# Patient Record
Sex: MALE | Race: WHITE | NOT HISPANIC OR LATINO | Employment: OTHER | URBAN - METROPOLITAN AREA
[De-identification: names, ages, dates, MRNs, and addresses within clinical notes are randomized per-mention and may not be internally consistent; named-entity substitution may affect disease eponyms.]

---

## 2017-01-13 ENCOUNTER — COMMUNICATION - HEALTHEAST (OUTPATIENT)
Dept: FAMILY MEDICINE | Facility: CLINIC | Age: 82
End: 2017-01-13

## 2017-01-15 ENCOUNTER — AMBULATORY - HEALTHEAST (OUTPATIENT)
Dept: FAMILY MEDICINE | Facility: CLINIC | Age: 82
End: 2017-01-15

## 2017-01-15 DIAGNOSIS — E53.8 OTHER B-COMPLEX DEFICIENCIES: ICD-10-CM

## 2017-01-17 ENCOUNTER — AMBULATORY - HEALTHEAST (OUTPATIENT)
Dept: NURSING | Facility: CLINIC | Age: 82
End: 2017-01-17

## 2017-01-17 ENCOUNTER — COMMUNICATION - HEALTHEAST (OUTPATIENT)
Dept: FAMILY MEDICINE | Facility: CLINIC | Age: 82
End: 2017-01-17

## 2017-01-17 DIAGNOSIS — E53.8 B12 DEFICIENCY: ICD-10-CM

## 2017-02-21 ENCOUNTER — AMBULATORY - HEALTHEAST (OUTPATIENT)
Dept: NURSING | Facility: CLINIC | Age: 82
End: 2017-02-21

## 2017-03-21 ENCOUNTER — AMBULATORY - HEALTHEAST (OUTPATIENT)
Dept: NURSING | Facility: CLINIC | Age: 82
End: 2017-03-21

## 2017-04-18 ENCOUNTER — AMBULATORY - HEALTHEAST (OUTPATIENT)
Dept: NURSING | Facility: CLINIC | Age: 82
End: 2017-04-18

## 2017-05-16 ENCOUNTER — AMBULATORY - HEALTHEAST (OUTPATIENT)
Dept: NURSING | Facility: CLINIC | Age: 82
End: 2017-05-16

## 2017-06-14 ENCOUNTER — OFFICE VISIT - HEALTHEAST (OUTPATIENT)
Dept: FAMILY MEDICINE | Facility: CLINIC | Age: 82
End: 2017-06-14

## 2017-06-14 DIAGNOSIS — Z23 NEED FOR PROPHYLACTIC VACCINATION WITH TETANUS-DIPHTHERIA (TD): ICD-10-CM

## 2017-06-14 DIAGNOSIS — I10 ESSENTIAL HYPERTENSION, BENIGN: ICD-10-CM

## 2017-06-14 DIAGNOSIS — S40.819A ARM ABRASION: ICD-10-CM

## 2017-06-14 DIAGNOSIS — M47.817 LUMBOSACRAL SPONDYLOSIS WITHOUT MYELOPATHY: ICD-10-CM

## 2017-06-14 DIAGNOSIS — E53.8 OTHER B-COMPLEX DEFICIENCIES: ICD-10-CM

## 2017-06-14 DIAGNOSIS — Z93.6 S/P ILEAL CONDUIT (H): ICD-10-CM

## 2017-06-15 ENCOUNTER — COMMUNICATION - HEALTHEAST (OUTPATIENT)
Dept: FAMILY MEDICINE | Facility: CLINIC | Age: 82
End: 2017-06-15

## 2017-07-18 ENCOUNTER — AMBULATORY - HEALTHEAST (OUTPATIENT)
Dept: NURSING | Facility: CLINIC | Age: 82
End: 2017-07-18

## 2017-08-15 ENCOUNTER — AMBULATORY - HEALTHEAST (OUTPATIENT)
Dept: NURSING | Facility: CLINIC | Age: 82
End: 2017-08-15

## 2017-09-19 ENCOUNTER — AMBULATORY - HEALTHEAST (OUTPATIENT)
Dept: NURSING | Facility: CLINIC | Age: 82
End: 2017-09-19

## 2017-10-17 ENCOUNTER — AMBULATORY - HEALTHEAST (OUTPATIENT)
Dept: NURSING | Facility: CLINIC | Age: 82
End: 2017-10-17

## 2017-10-17 DIAGNOSIS — Z23 NEED FOR IMMUNIZATION AGAINST INFLUENZA: ICD-10-CM

## 2017-11-21 ENCOUNTER — AMBULATORY - HEALTHEAST (OUTPATIENT)
Dept: NURSING | Facility: CLINIC | Age: 82
End: 2017-11-21

## 2017-12-20 ENCOUNTER — COMMUNICATION - HEALTHEAST (OUTPATIENT)
Dept: FAMILY MEDICINE | Facility: CLINIC | Age: 82
End: 2017-12-20

## 2017-12-20 ENCOUNTER — OFFICE VISIT - HEALTHEAST (OUTPATIENT)
Dept: FAMILY MEDICINE | Facility: CLINIC | Age: 82
End: 2017-12-20

## 2017-12-20 DIAGNOSIS — E53.8 B12 DEFICIENCY: ICD-10-CM

## 2017-12-20 DIAGNOSIS — J44.9 COPD (CHRONIC OBSTRUCTIVE PULMONARY DISEASE) (H): ICD-10-CM

## 2017-12-20 DIAGNOSIS — I10 ESSENTIAL HYPERTENSION, BENIGN: ICD-10-CM

## 2017-12-20 DIAGNOSIS — M47.817 LUMBOSACRAL SPONDYLOSIS WITHOUT MYELOPATHY: ICD-10-CM

## 2018-01-16 ENCOUNTER — AMBULATORY - HEALTHEAST (OUTPATIENT)
Dept: FAMILY MEDICINE | Facility: CLINIC | Age: 83
End: 2018-01-16

## 2018-01-16 ENCOUNTER — AMBULATORY - HEALTHEAST (OUTPATIENT)
Dept: NURSING | Facility: CLINIC | Age: 83
End: 2018-01-16

## 2018-01-16 DIAGNOSIS — E53.8 B12 DEFICIENCY: ICD-10-CM

## 2018-02-13 ENCOUNTER — AMBULATORY - HEALTHEAST (OUTPATIENT)
Dept: NURSING | Facility: CLINIC | Age: 83
End: 2018-02-13

## 2018-02-25 ENCOUNTER — COMMUNICATION - HEALTHEAST (OUTPATIENT)
Dept: FAMILY MEDICINE | Facility: CLINIC | Age: 83
End: 2018-02-25

## 2018-02-25 DIAGNOSIS — J44.9 COPD (CHRONIC OBSTRUCTIVE PULMONARY DISEASE) (H): ICD-10-CM

## 2018-03-13 ENCOUNTER — AMBULATORY - HEALTHEAST (OUTPATIENT)
Dept: NURSING | Facility: CLINIC | Age: 83
End: 2018-03-13

## 2018-03-13 DIAGNOSIS — E53.8 B12 DEFICIENCY: ICD-10-CM

## 2018-04-17 ENCOUNTER — AMBULATORY - HEALTHEAST (OUTPATIENT)
Dept: NURSING | Facility: CLINIC | Age: 83
End: 2018-04-17

## 2018-05-04 ENCOUNTER — RECORDS - HEALTHEAST (OUTPATIENT)
Dept: ADMINISTRATIVE | Facility: OTHER | Age: 83
End: 2018-05-04

## 2018-05-07 ENCOUNTER — RECORDS - HEALTHEAST (OUTPATIENT)
Dept: ADMINISTRATIVE | Facility: OTHER | Age: 83
End: 2018-05-07

## 2018-05-15 ENCOUNTER — AMBULATORY - HEALTHEAST (OUTPATIENT)
Dept: NURSING | Facility: CLINIC | Age: 83
End: 2018-05-15

## 2018-06-14 ENCOUNTER — OFFICE VISIT - HEALTHEAST (OUTPATIENT)
Dept: FAMILY MEDICINE | Facility: CLINIC | Age: 83
End: 2018-06-14

## 2018-06-14 ENCOUNTER — COMMUNICATION - HEALTHEAST (OUTPATIENT)
Dept: ONCOLOGY | Facility: CLINIC | Age: 83
End: 2018-06-14

## 2018-06-14 DIAGNOSIS — E53.8 B12 DEFICIENCY: ICD-10-CM

## 2018-06-14 DIAGNOSIS — R91.1 LUNG NODULE: ICD-10-CM

## 2018-06-14 DIAGNOSIS — Z93.6 S/P ILEAL CONDUIT (H): ICD-10-CM

## 2018-06-14 DIAGNOSIS — I10 ESSENTIAL HYPERTENSION, BENIGN: ICD-10-CM

## 2018-06-14 LAB
ALBUMIN SERPL-MCNC: 4 G/DL (ref 3.5–5)
ALP SERPL-CCNC: 62 U/L (ref 45–120)
ALT SERPL W P-5'-P-CCNC: 17 U/L (ref 0–45)
ANION GAP SERPL CALCULATED.3IONS-SCNC: 5 MMOL/L (ref 5–18)
AST SERPL W P-5'-P-CCNC: 21 U/L (ref 0–40)
BILIRUB DIRECT SERPL-MCNC: 0.2 MG/DL
BILIRUB SERPL-MCNC: 0.5 MG/DL (ref 0–1)
BUN SERPL-MCNC: 21 MG/DL (ref 8–28)
CALCIUM SERPL-MCNC: 9.6 MG/DL (ref 8.5–10.5)
CHLORIDE BLD-SCNC: 108 MMOL/L (ref 98–107)
CO2 SERPL-SCNC: 29 MMOL/L (ref 22–31)
CREAT SERPL-MCNC: 0.89 MG/DL (ref 0.7–1.3)
ERYTHROCYTE [DISTWIDTH] IN BLOOD BY AUTOMATED COUNT: 13.3 % (ref 11–14.5)
GFR SERPL CREATININE-BSD FRML MDRD: >60 ML/MIN/1.73M2
GLUCOSE BLD-MCNC: 98 MG/DL (ref 70–125)
HCT VFR BLD AUTO: 41.2 % (ref 40–54)
HGB BLD-MCNC: 13.7 G/DL (ref 14–18)
MCH RBC QN AUTO: 29.5 PG (ref 27–34)
MCHC RBC AUTO-ENTMCNC: 33.2 G/DL (ref 32–36)
MCV RBC AUTO: 89 FL (ref 80–100)
PLATELET # BLD AUTO: 216 THOU/UL (ref 140–440)
PMV BLD AUTO: 6.4 FL (ref 7–10)
POTASSIUM BLD-SCNC: 5.1 MMOL/L (ref 3.5–5)
PROT SERPL-MCNC: 6.7 G/DL (ref 6–8)
RBC # BLD AUTO: 4.64 MILL/UL (ref 4.4–6.2)
SODIUM SERPL-SCNC: 142 MMOL/L (ref 136–145)
WBC: 5.4 THOU/UL (ref 4–11)

## 2018-06-20 ENCOUNTER — RECORDS - HEALTHEAST (OUTPATIENT)
Dept: ADMINISTRATIVE | Facility: OTHER | Age: 83
End: 2018-06-20

## 2018-06-22 ENCOUNTER — COMMUNICATION - HEALTHEAST (OUTPATIENT)
Dept: FAMILY MEDICINE | Facility: CLINIC | Age: 83
End: 2018-06-22

## 2018-06-25 ENCOUNTER — RECORDS - HEALTHEAST (OUTPATIENT)
Dept: RADIOLOGY | Facility: CLINIC | Age: 83
End: 2018-06-25

## 2018-06-26 ENCOUNTER — COMMUNICATION - HEALTHEAST (OUTPATIENT)
Dept: FAMILY MEDICINE | Facility: CLINIC | Age: 83
End: 2018-06-26

## 2018-06-26 ENCOUNTER — COMMUNICATION - HEALTHEAST (OUTPATIENT)
Dept: ONCOLOGY | Facility: CLINIC | Age: 83
End: 2018-06-26

## 2018-06-27 ENCOUNTER — RECORDS - HEALTHEAST (OUTPATIENT)
Dept: ADMINISTRATIVE | Facility: OTHER | Age: 83
End: 2018-06-27

## 2018-07-03 ENCOUNTER — AMBULATORY - HEALTHEAST (OUTPATIENT)
Dept: RADIATION ONCOLOGY | Facility: HOSPITAL | Age: 83
End: 2018-07-03

## 2018-07-03 ENCOUNTER — OFFICE VISIT - HEALTHEAST (OUTPATIENT)
Dept: RADIATION ONCOLOGY | Facility: HOSPITAL | Age: 83
End: 2018-07-03

## 2018-07-03 DIAGNOSIS — R91.1 LUNG NODULE: ICD-10-CM

## 2018-07-05 ENCOUNTER — COMMUNICATION - HEALTHEAST (OUTPATIENT)
Dept: ONCOLOGY | Facility: CLINIC | Age: 83
End: 2018-07-05

## 2018-07-06 ENCOUNTER — HOSPITAL ENCOUNTER (OUTPATIENT)
Dept: PET IMAGING | Facility: HOSPITAL | Age: 83
Setting detail: RADIATION/ONCOLOGY SERIES
Discharge: STILL ADMITTED OR ADMINISTRATIVE | End: 2018-07-06
Attending: RADIOLOGY

## 2018-07-06 DIAGNOSIS — R91.1 LUNG NODULE: ICD-10-CM

## 2018-07-06 LAB — GLUCOSE BLDC GLUCOMTR-MCNC: 91 MG/DL

## 2018-07-06 ASSESSMENT — MIFFLIN-ST. JEOR: SCORE: 1520.87

## 2018-07-09 ENCOUNTER — COMMUNICATION - HEALTHEAST (OUTPATIENT)
Dept: INTERVENTIONAL RADIOLOGY/VASCULAR | Facility: CLINIC | Age: 83
End: 2018-07-09

## 2018-07-09 ENCOUNTER — COMMUNICATION - HEALTHEAST (OUTPATIENT)
Dept: ONCOLOGY | Facility: CLINIC | Age: 83
End: 2018-07-09

## 2018-07-10 ENCOUNTER — AMBULATORY - HEALTHEAST (OUTPATIENT)
Dept: NURSING | Facility: CLINIC | Age: 83
End: 2018-07-10

## 2018-07-13 ENCOUNTER — COMMUNICATION - HEALTHEAST (OUTPATIENT)
Dept: ONCOLOGY | Facility: CLINIC | Age: 83
End: 2018-07-13

## 2018-07-16 ENCOUNTER — HOSPITAL ENCOUNTER (OUTPATIENT)
Dept: CT IMAGING | Facility: CLINIC | Age: 83
Setting detail: RADIATION/ONCOLOGY SERIES
Discharge: STILL ADMITTED OR ADMINISTRATIVE | End: 2018-07-16
Attending: RADIOLOGY

## 2018-07-16 DIAGNOSIS — R91.1 LUNG NODULE: ICD-10-CM

## 2018-07-16 LAB
HGB BLD-MCNC: 12 G/DL (ref 14–18)
INR PPP: 1.06 (ref 0.9–1.1)
PLATELET # BLD AUTO: 158 THOU/UL (ref 140–440)

## 2018-07-16 ASSESSMENT — MIFFLIN-ST. JEOR: SCORE: 1527.96

## 2018-07-24 ENCOUNTER — AMBULATORY - HEALTHEAST (OUTPATIENT)
Dept: RADIATION ONCOLOGY | Facility: HOSPITAL | Age: 83
End: 2018-07-24

## 2018-08-07 ENCOUNTER — AMBULATORY - HEALTHEAST (OUTPATIENT)
Dept: NURSING | Facility: CLINIC | Age: 83
End: 2018-08-07

## 2018-08-09 ENCOUNTER — AMBULATORY - HEALTHEAST (OUTPATIENT)
Dept: RADIATION ONCOLOGY | Facility: HOSPITAL | Age: 83
End: 2018-08-09

## 2018-08-15 ENCOUNTER — AMBULATORY - HEALTHEAST (OUTPATIENT)
Dept: ONCOLOGY | Facility: CLINIC | Age: 83
End: 2018-08-15

## 2018-08-16 ENCOUNTER — OFFICE VISIT - HEALTHEAST (OUTPATIENT)
Dept: RADIATION ONCOLOGY | Facility: CLINIC | Age: 83
End: 2018-08-16

## 2018-08-16 DIAGNOSIS — R91.1 LUNG NODULE: ICD-10-CM

## 2018-08-23 ENCOUNTER — AMBULATORY - HEALTHEAST (OUTPATIENT)
Dept: RADIATION ONCOLOGY | Facility: CLINIC | Age: 83
End: 2018-08-23

## 2018-08-31 ENCOUNTER — COMMUNICATION - HEALTHEAST (OUTPATIENT)
Dept: RADIATION ONCOLOGY | Facility: CLINIC | Age: 83
End: 2018-08-31

## 2018-09-04 ENCOUNTER — AMBULATORY - HEALTHEAST (OUTPATIENT)
Dept: NURSING | Facility: CLINIC | Age: 83
End: 2018-09-04

## 2018-10-02 ENCOUNTER — AMBULATORY - HEALTHEAST (OUTPATIENT)
Dept: NURSING | Facility: CLINIC | Age: 83
End: 2018-10-02

## 2018-10-02 DIAGNOSIS — Z23 NEED FOR IMMUNIZATION AGAINST INFLUENZA: ICD-10-CM

## 2018-10-30 ENCOUNTER — AMBULATORY - HEALTHEAST (OUTPATIENT)
Dept: NURSING | Facility: CLINIC | Age: 83
End: 2018-10-30

## 2018-11-08 ENCOUNTER — HOSPITAL ENCOUNTER (OUTPATIENT)
Dept: CT IMAGING | Facility: CLINIC | Age: 83
Setting detail: RADIATION/ONCOLOGY SERIES
Discharge: STILL ADMITTED OR ADMINISTRATIVE | End: 2018-11-08
Attending: RADIOLOGY

## 2018-11-08 DIAGNOSIS — R91.1 LUNG NODULE: ICD-10-CM

## 2018-11-12 ENCOUNTER — AMBULATORY - HEALTHEAST (OUTPATIENT)
Dept: ONCOLOGY | Facility: CLINIC | Age: 83
End: 2018-11-12

## 2018-11-12 ENCOUNTER — OFFICE VISIT - HEALTHEAST (OUTPATIENT)
Dept: RADIATION ONCOLOGY | Facility: CLINIC | Age: 83
End: 2018-11-12

## 2018-11-12 DIAGNOSIS — R91.1 LUNG NODULE: ICD-10-CM

## 2018-11-27 ENCOUNTER — AMBULATORY - HEALTHEAST (OUTPATIENT)
Dept: NURSING | Facility: CLINIC | Age: 83
End: 2018-11-27

## 2018-12-24 ENCOUNTER — OFFICE VISIT - HEALTHEAST (OUTPATIENT)
Dept: FAMILY MEDICINE | Facility: CLINIC | Age: 83
End: 2018-12-24

## 2018-12-24 DIAGNOSIS — J43.9 PULMONARY EMPHYSEMA, UNSPECIFIED EMPHYSEMA TYPE (H): ICD-10-CM

## 2018-12-24 DIAGNOSIS — Z93.6 S/P ILEAL CONDUIT (H): ICD-10-CM

## 2018-12-24 DIAGNOSIS — I10 ESSENTIAL HYPERTENSION, BENIGN: ICD-10-CM

## 2018-12-24 DIAGNOSIS — M47.817 LUMBOSACRAL SPONDYLOSIS WITHOUT MYELOPATHY: ICD-10-CM

## 2018-12-24 DIAGNOSIS — R91.1 LUNG NODULE: ICD-10-CM

## 2018-12-24 DIAGNOSIS — E53.8 B12 DEFICIENCY: ICD-10-CM

## 2018-12-24 LAB
ALBUMIN SERPL-MCNC: 3.7 G/DL (ref 3.5–5)
ALP SERPL-CCNC: 55 U/L (ref 45–120)
ALT SERPL W P-5'-P-CCNC: 12 U/L (ref 0–45)
ANION GAP SERPL CALCULATED.3IONS-SCNC: 6 MMOL/L (ref 5–18)
AST SERPL W P-5'-P-CCNC: 16 U/L (ref 0–40)
BILIRUB DIRECT SERPL-MCNC: 0.2 MG/DL
BILIRUB SERPL-MCNC: 0.6 MG/DL (ref 0–1)
BUN SERPL-MCNC: 25 MG/DL (ref 8–28)
CALCIUM SERPL-MCNC: 9 MG/DL (ref 8.5–10.5)
CHLORIDE BLD-SCNC: 110 MMOL/L (ref 98–107)
CO2 SERPL-SCNC: 28 MMOL/L (ref 22–31)
CREAT SERPL-MCNC: 0.79 MG/DL (ref 0.7–1.3)
ERYTHROCYTE [DISTWIDTH] IN BLOOD BY AUTOMATED COUNT: 14.2 % (ref 11–14.5)
GFR SERPL CREATININE-BSD FRML MDRD: >60 ML/MIN/1.73M2
GLUCOSE BLD-MCNC: 105 MG/DL (ref 70–125)
HCT VFR BLD AUTO: 41.3 % (ref 40–54)
HGB BLD-MCNC: 13 G/DL (ref 14–18)
MCH RBC QN AUTO: 29.5 PG (ref 27–34)
MCHC RBC AUTO-ENTMCNC: 31.5 G/DL (ref 32–36)
MCV RBC AUTO: 94 FL (ref 80–100)
PLATELET # BLD AUTO: 188 THOU/UL (ref 140–440)
PMV BLD AUTO: 8.9 FL (ref 8.5–12.5)
POTASSIUM BLD-SCNC: 3.9 MMOL/L (ref 3.5–5)
PROT SERPL-MCNC: 6.2 G/DL (ref 6–8)
RBC # BLD AUTO: 4.4 MILL/UL (ref 4.4–6.2)
SODIUM SERPL-SCNC: 144 MMOL/L (ref 136–145)
WBC: 4.6 THOU/UL (ref 4–11)

## 2018-12-28 ENCOUNTER — COMMUNICATION - HEALTHEAST (OUTPATIENT)
Dept: FAMILY MEDICINE | Facility: CLINIC | Age: 83
End: 2018-12-28

## 2018-12-28 DIAGNOSIS — I10 ESSENTIAL HYPERTENSION, BENIGN: ICD-10-CM

## 2019-01-09 ENCOUNTER — COMMUNICATION - HEALTHEAST (OUTPATIENT)
Dept: FAMILY MEDICINE | Facility: CLINIC | Age: 84
End: 2019-01-09

## 2019-01-22 ENCOUNTER — AMBULATORY - HEALTHEAST (OUTPATIENT)
Dept: NURSING | Facility: CLINIC | Age: 84
End: 2019-01-22

## 2019-02-04 ENCOUNTER — OFFICE VISIT - HEALTHEAST (OUTPATIENT)
Dept: FAMILY MEDICINE | Facility: CLINIC | Age: 84
End: 2019-02-04

## 2019-02-04 DIAGNOSIS — J44.9 COPD (CHRONIC OBSTRUCTIVE PULMONARY DISEASE) (H): ICD-10-CM

## 2019-02-04 DIAGNOSIS — J44.1 COPD EXACERBATION (H): ICD-10-CM

## 2019-02-04 ASSESSMENT — MIFFLIN-ST. JEOR: SCORE: 1523.14

## 2019-02-14 ENCOUNTER — OFFICE VISIT - HEALTHEAST (OUTPATIENT)
Dept: FAMILY MEDICINE | Facility: CLINIC | Age: 84
End: 2019-02-14

## 2019-02-14 DIAGNOSIS — J98.01 BRONCHOSPASM: ICD-10-CM

## 2019-02-14 DIAGNOSIS — R05.9 COUGH: ICD-10-CM

## 2019-02-18 ENCOUNTER — COMMUNICATION - HEALTHEAST (OUTPATIENT)
Dept: FAMILY MEDICINE | Facility: CLINIC | Age: 84
End: 2019-02-18

## 2019-02-19 ENCOUNTER — AMBULATORY - HEALTHEAST (OUTPATIENT)
Dept: NURSING | Facility: CLINIC | Age: 84
End: 2019-02-19

## 2019-02-19 ENCOUNTER — AMBULATORY - HEALTHEAST (OUTPATIENT)
Dept: FAMILY MEDICINE | Facility: CLINIC | Age: 84
End: 2019-02-19

## 2019-02-19 ENCOUNTER — COMMUNICATION - HEALTHEAST (OUTPATIENT)
Dept: FAMILY MEDICINE | Facility: CLINIC | Age: 84
End: 2019-02-19

## 2019-02-19 DIAGNOSIS — E53.8 B12 DEFICIENCY: ICD-10-CM

## 2019-02-20 ENCOUNTER — AMBULATORY - HEALTHEAST (OUTPATIENT)
Dept: FAMILY MEDICINE | Facility: CLINIC | Age: 84
End: 2019-02-20

## 2019-02-20 DIAGNOSIS — E53.8 B12 DEFICIENCY: ICD-10-CM

## 2019-02-28 ENCOUNTER — OFFICE VISIT - HEALTHEAST (OUTPATIENT)
Dept: FAMILY MEDICINE | Facility: CLINIC | Age: 84
End: 2019-02-28

## 2019-02-28 DIAGNOSIS — J43.9 PULMONARY EMPHYSEMA, UNSPECIFIED EMPHYSEMA TYPE (H): ICD-10-CM

## 2019-02-28 DIAGNOSIS — R05.9 COUGH: ICD-10-CM

## 2019-03-06 ENCOUNTER — HOSPITAL ENCOUNTER (OUTPATIENT)
Dept: CT IMAGING | Facility: CLINIC | Age: 84
Discharge: HOME | End: 2019-03-06
Attending: RADIOLOGY

## 2019-03-06 DIAGNOSIS — R91.1 LUNG NODULE: ICD-10-CM

## 2019-03-19 ENCOUNTER — AMBULATORY - HEALTHEAST (OUTPATIENT)
Dept: NURSING | Facility: CLINIC | Age: 84
End: 2019-03-19

## 2019-03-21 ENCOUNTER — OFFICE VISIT - HEALTHEAST (OUTPATIENT)
Dept: FAMILY MEDICINE | Facility: CLINIC | Age: 84
End: 2019-03-21

## 2019-03-21 DIAGNOSIS — I10 ESSENTIAL HYPERTENSION, BENIGN: ICD-10-CM

## 2019-03-21 DIAGNOSIS — J43.9 PULMONARY EMPHYSEMA, UNSPECIFIED EMPHYSEMA TYPE (H): ICD-10-CM

## 2019-04-01 ENCOUNTER — COMMUNICATION - HEALTHEAST (OUTPATIENT)
Dept: FAMILY MEDICINE | Facility: CLINIC | Age: 84
End: 2019-04-01

## 2019-04-01 DIAGNOSIS — J44.1 COPD EXACERBATION (H): ICD-10-CM

## 2019-04-01 DIAGNOSIS — J44.9 COPD (CHRONIC OBSTRUCTIVE PULMONARY DISEASE) (H): ICD-10-CM

## 2019-04-16 ENCOUNTER — AMBULATORY - HEALTHEAST (OUTPATIENT)
Dept: NURSING | Facility: CLINIC | Age: 84
End: 2019-04-16

## 2019-05-01 ENCOUNTER — COMMUNICATION - HEALTHEAST (OUTPATIENT)
Dept: ADMINISTRATIVE | Facility: HOSPITAL | Age: 84
End: 2019-05-01

## 2019-05-13 ENCOUNTER — OFFICE VISIT - HEALTHEAST (OUTPATIENT)
Dept: RADIATION ONCOLOGY | Facility: CLINIC | Age: 84
End: 2019-05-13

## 2019-05-13 ENCOUNTER — AMBULATORY - HEALTHEAST (OUTPATIENT)
Dept: ONCOLOGY | Facility: CLINIC | Age: 84
End: 2019-05-13

## 2019-05-13 DIAGNOSIS — C34.12 MALIGNANT NEOPLASM OF UPPER LOBE OF LEFT LUNG (H): ICD-10-CM

## 2019-05-13 DIAGNOSIS — R91.1 LUNG NODULE: ICD-10-CM

## 2019-05-14 ENCOUNTER — AMBULATORY - HEALTHEAST (OUTPATIENT)
Dept: NURSING | Facility: CLINIC | Age: 84
End: 2019-05-14

## 2019-06-12 ENCOUNTER — OFFICE VISIT - HEALTHEAST (OUTPATIENT)
Dept: FAMILY MEDICINE | Facility: CLINIC | Age: 84
End: 2019-06-12

## 2019-06-12 DIAGNOSIS — E53.8 B12 DEFICIENCY: ICD-10-CM

## 2019-06-12 DIAGNOSIS — Z93.6 S/P ILEAL CONDUIT (H): ICD-10-CM

## 2019-06-12 DIAGNOSIS — I10 ESSENTIAL HYPERTENSION, BENIGN: ICD-10-CM

## 2019-06-12 DIAGNOSIS — J43.9 PULMONARY EMPHYSEMA, UNSPECIFIED EMPHYSEMA TYPE (H): ICD-10-CM

## 2019-06-12 LAB
ANION GAP SERPL CALCULATED.3IONS-SCNC: 8 MMOL/L (ref 5–18)
BUN SERPL-MCNC: 23 MG/DL (ref 8–28)
CALCIUM SERPL-MCNC: 9.2 MG/DL (ref 8.5–10.5)
CHLORIDE BLD-SCNC: 109 MMOL/L (ref 98–107)
CO2 SERPL-SCNC: 27 MMOL/L (ref 22–31)
CREAT SERPL-MCNC: 0.84 MG/DL (ref 0.7–1.3)
GFR SERPL CREATININE-BSD FRML MDRD: >60 ML/MIN/1.73M2
GLUCOSE BLD-MCNC: 96 MG/DL (ref 70–125)
POTASSIUM BLD-SCNC: 4.7 MMOL/L (ref 3.5–5)
SODIUM SERPL-SCNC: 144 MMOL/L (ref 136–145)
VIT B12 SERPL-MCNC: 258 PG/ML (ref 213–816)

## 2019-06-12 ASSESSMENT — MIFFLIN-ST. JEOR: SCORE: 1509.53

## 2019-07-16 ENCOUNTER — AMBULATORY - HEALTHEAST (OUTPATIENT)
Dept: NURSING | Facility: CLINIC | Age: 84
End: 2019-07-16

## 2019-07-26 ENCOUNTER — COMMUNICATION - HEALTHEAST (OUTPATIENT)
Dept: FAMILY MEDICINE | Facility: CLINIC | Age: 84
End: 2019-07-26

## 2019-07-26 DIAGNOSIS — J43.9 PULMONARY EMPHYSEMA, UNSPECIFIED EMPHYSEMA TYPE (H): ICD-10-CM

## 2019-07-29 ENCOUNTER — COMMUNICATION - HEALTHEAST (OUTPATIENT)
Dept: FAMILY MEDICINE | Facility: CLINIC | Age: 84
End: 2019-07-29

## 2019-07-29 DIAGNOSIS — J43.9 PULMONARY EMPHYSEMA, UNSPECIFIED EMPHYSEMA TYPE (H): ICD-10-CM

## 2019-08-20 ENCOUNTER — AMBULATORY - HEALTHEAST (OUTPATIENT)
Dept: NURSING | Facility: CLINIC | Age: 84
End: 2019-08-20

## 2019-09-17 ENCOUNTER — AMBULATORY - HEALTHEAST (OUTPATIENT)
Dept: NURSING | Facility: CLINIC | Age: 84
End: 2019-09-17

## 2019-09-24 ENCOUNTER — COMMUNICATION - HEALTHEAST (OUTPATIENT)
Dept: FAMILY MEDICINE | Facility: CLINIC | Age: 84
End: 2019-09-24

## 2019-09-24 DIAGNOSIS — I10 ESSENTIAL HYPERTENSION, BENIGN: ICD-10-CM

## 2019-09-26 ENCOUNTER — COMMUNICATION - HEALTHEAST (OUTPATIENT)
Dept: FAMILY MEDICINE | Facility: CLINIC | Age: 84
End: 2019-09-26

## 2019-09-26 DIAGNOSIS — I10 ESSENTIAL HYPERTENSION, BENIGN: ICD-10-CM

## 2019-10-15 ENCOUNTER — AMBULATORY - HEALTHEAST (OUTPATIENT)
Dept: NURSING | Facility: CLINIC | Age: 84
End: 2019-10-15

## 2019-10-15 DIAGNOSIS — Z23 NEED FOR IMMUNIZATION AGAINST INFLUENZA: ICD-10-CM

## 2019-10-28 ENCOUNTER — OFFICE VISIT - HEALTHEAST (OUTPATIENT)
Dept: FAMILY MEDICINE | Facility: CLINIC | Age: 84
End: 2019-10-28

## 2019-10-28 DIAGNOSIS — J44.1 COPD EXACERBATION (H): ICD-10-CM

## 2019-10-28 DIAGNOSIS — J44.9 COPD (CHRONIC OBSTRUCTIVE PULMONARY DISEASE) (H): ICD-10-CM

## 2019-11-05 ENCOUNTER — COMMUNICATION - HEALTHEAST (OUTPATIENT)
Dept: FAMILY MEDICINE | Facility: CLINIC | Age: 84
End: 2019-11-05

## 2019-11-05 DIAGNOSIS — J44.1 COPD EXACERBATION (H): ICD-10-CM

## 2019-11-06 ENCOUNTER — OFFICE VISIT - HEALTHEAST (OUTPATIENT)
Dept: FAMILY MEDICINE | Facility: CLINIC | Age: 84
End: 2019-11-06

## 2019-11-06 DIAGNOSIS — J44.1 COPD EXACERBATION (H): ICD-10-CM

## 2019-11-06 DIAGNOSIS — I10 ESSENTIAL HYPERTENSION, BENIGN: ICD-10-CM

## 2019-11-06 LAB
ANION GAP SERPL CALCULATED.3IONS-SCNC: 7 MMOL/L (ref 5–18)
BUN SERPL-MCNC: 20 MG/DL (ref 8–28)
CALCIUM SERPL-MCNC: 9 MG/DL (ref 8.5–10.5)
CHLORIDE BLD-SCNC: 108 MMOL/L (ref 98–107)
CO2 SERPL-SCNC: 26 MMOL/L (ref 22–31)
CREAT SERPL-MCNC: 0.83 MG/DL (ref 0.7–1.3)
GFR SERPL CREATININE-BSD FRML MDRD: >60 ML/MIN/1.73M2
GLUCOSE BLD-MCNC: 88 MG/DL (ref 70–125)
POTASSIUM BLD-SCNC: 5.2 MMOL/L (ref 3.5–5)
SODIUM SERPL-SCNC: 141 MMOL/L (ref 136–145)

## 2019-11-12 ENCOUNTER — AMBULATORY - HEALTHEAST (OUTPATIENT)
Dept: NURSING | Facility: CLINIC | Age: 84
End: 2019-11-12

## 2019-11-12 DIAGNOSIS — E53.8 B12 DEFICIENCY: ICD-10-CM

## 2019-11-13 ENCOUNTER — HOSPITAL ENCOUNTER (OUTPATIENT)
Dept: CT IMAGING | Facility: CLINIC | Age: 84
Discharge: HOME | End: 2019-11-13
Attending: RADIOLOGY

## 2019-11-13 ENCOUNTER — COMMUNICATION - HEALTHEAST (OUTPATIENT)
Dept: FAMILY MEDICINE | Facility: CLINIC | Age: 84
End: 2019-11-13

## 2019-11-13 DIAGNOSIS — R91.1 LUNG NODULE: ICD-10-CM

## 2019-11-14 ENCOUNTER — OFFICE VISIT - HEALTHEAST (OUTPATIENT)
Dept: RADIATION ONCOLOGY | Facility: CLINIC | Age: 84
End: 2019-11-14

## 2019-11-14 DIAGNOSIS — C34.12 MALIGNANT NEOPLASM OF UPPER LOBE OF LEFT LUNG (H): ICD-10-CM

## 2019-12-17 ENCOUNTER — AMBULATORY - HEALTHEAST (OUTPATIENT)
Dept: NURSING | Facility: CLINIC | Age: 84
End: 2019-12-17

## 2020-01-14 ENCOUNTER — AMBULATORY - HEALTHEAST (OUTPATIENT)
Dept: NURSING | Facility: CLINIC | Age: 85
End: 2020-01-14

## 2020-01-14 ENCOUNTER — COMMUNICATION - HEALTHEAST (OUTPATIENT)
Dept: FAMILY MEDICINE | Facility: CLINIC | Age: 85
End: 2020-01-14

## 2020-01-14 DIAGNOSIS — E53.8 B12 DEFICIENCY: ICD-10-CM

## 2020-02-18 ENCOUNTER — AMBULATORY - HEALTHEAST (OUTPATIENT)
Dept: NURSING | Facility: CLINIC | Age: 85
End: 2020-02-18

## 2020-03-18 ENCOUNTER — OFFICE VISIT - HEALTHEAST (OUTPATIENT)
Dept: FAMILY MEDICINE | Facility: CLINIC | Age: 85
End: 2020-03-18

## 2020-03-18 DIAGNOSIS — Z93.6 S/P ILEAL CONDUIT (H): ICD-10-CM

## 2020-03-18 DIAGNOSIS — C34.12 MALIGNANT NEOPLASM OF UPPER LOBE OF LEFT LUNG (H): ICD-10-CM

## 2020-03-18 DIAGNOSIS — E53.8 B12 DEFICIENCY: ICD-10-CM

## 2020-03-18 DIAGNOSIS — I10 ESSENTIAL HYPERTENSION, BENIGN: ICD-10-CM

## 2020-03-18 DIAGNOSIS — J43.9 PULMONARY EMPHYSEMA, UNSPECIFIED EMPHYSEMA TYPE (H): ICD-10-CM

## 2020-05-26 ENCOUNTER — COMMUNICATION - HEALTHEAST (OUTPATIENT)
Dept: FAMILY MEDICINE | Facility: CLINIC | Age: 85
End: 2020-05-26

## 2020-05-26 DIAGNOSIS — J43.9 PULMONARY EMPHYSEMA, UNSPECIFIED EMPHYSEMA TYPE (H): ICD-10-CM

## 2020-06-12 ENCOUNTER — COMMUNICATION - HEALTHEAST (OUTPATIENT)
Dept: FAMILY MEDICINE | Facility: CLINIC | Age: 85
End: 2020-06-12

## 2020-06-22 ENCOUNTER — OFFICE VISIT - HEALTHEAST (OUTPATIENT)
Dept: FAMILY MEDICINE | Facility: CLINIC | Age: 85
End: 2020-06-22

## 2020-06-22 DIAGNOSIS — J43.9 PULMONARY EMPHYSEMA, UNSPECIFIED EMPHYSEMA TYPE (H): ICD-10-CM

## 2020-06-22 DIAGNOSIS — I10 ESSENTIAL HYPERTENSION, BENIGN: ICD-10-CM

## 2020-06-22 DIAGNOSIS — E53.8 B12 DEFICIENCY: ICD-10-CM

## 2020-06-22 DIAGNOSIS — L98.9 SKIN LESION: ICD-10-CM

## 2020-06-22 LAB
ANION GAP SERPL CALCULATED.3IONS-SCNC: 10 MMOL/L (ref 5–18)
BUN SERPL-MCNC: 22 MG/DL (ref 8–28)
CALCIUM SERPL-MCNC: 8.8 MG/DL (ref 8.5–10.5)
CHLORIDE BLD-SCNC: 107 MMOL/L (ref 98–107)
CO2 SERPL-SCNC: 25 MMOL/L (ref 22–31)
CREAT SERPL-MCNC: 0.84 MG/DL (ref 0.7–1.3)
GFR SERPL CREATININE-BSD FRML MDRD: >60 ML/MIN/1.73M2
GLUCOSE BLD-MCNC: 86 MG/DL (ref 70–125)
POTASSIUM BLD-SCNC: 4.4 MMOL/L (ref 3.5–5)
SODIUM SERPL-SCNC: 142 MMOL/L (ref 136–145)
VIT B12 SERPL-MCNC: 528 PG/ML (ref 213–816)

## 2020-07-06 ENCOUNTER — RECORDS - HEALTHEAST (OUTPATIENT)
Dept: ADMINISTRATIVE | Facility: OTHER | Age: 85
End: 2020-07-06

## 2020-07-06 ENCOUNTER — COMMUNICATION - HEALTHEAST (OUTPATIENT)
Dept: TELEHEALTH | Facility: CLINIC | Age: 85
End: 2020-07-06

## 2020-07-06 ENCOUNTER — HOSPITAL ENCOUNTER (OUTPATIENT)
Dept: CT IMAGING | Facility: CLINIC | Age: 85
Setting detail: RADIATION/ONCOLOGY SERIES
Discharge: STILL ADMITTED OR ADMINISTRATIVE | End: 2020-07-06
Attending: RADIOLOGY

## 2020-07-06 DIAGNOSIS — C34.12 MALIGNANT NEOPLASM OF UPPER LOBE OF LEFT LUNG (H): ICD-10-CM

## 2020-07-14 ENCOUNTER — OFFICE VISIT - HEALTHEAST (OUTPATIENT)
Dept: RADIATION ONCOLOGY | Facility: HOSPITAL | Age: 85
End: 2020-07-14

## 2020-07-14 DIAGNOSIS — C34.12 MALIGNANT NEOPLASM OF UPPER LOBE OF LEFT LUNG (H): ICD-10-CM

## 2020-07-15 ENCOUNTER — COMMUNICATION - HEALTHEAST (OUTPATIENT)
Dept: ONCOLOGY | Facility: CLINIC | Age: 85
End: 2020-07-15

## 2020-09-22 ENCOUNTER — COMMUNICATION - HEALTHEAST (OUTPATIENT)
Dept: FAMILY MEDICINE | Facility: CLINIC | Age: 85
End: 2020-09-22

## 2020-09-22 ENCOUNTER — OFFICE VISIT - HEALTHEAST (OUTPATIENT)
Dept: FAMILY MEDICINE | Facility: CLINIC | Age: 85
End: 2020-09-22

## 2020-09-22 DIAGNOSIS — I10 ESSENTIAL HYPERTENSION, BENIGN: ICD-10-CM

## 2020-09-22 DIAGNOSIS — E53.8 B12 DEFICIENCY: ICD-10-CM

## 2020-09-22 DIAGNOSIS — C34.12 MALIGNANT NEOPLASM OF UPPER LOBE OF LEFT LUNG (H): ICD-10-CM

## 2020-09-22 DIAGNOSIS — J44.9 COPD (CHRONIC OBSTRUCTIVE PULMONARY DISEASE) (H): ICD-10-CM

## 2020-09-22 DIAGNOSIS — J43.9 PULMONARY EMPHYSEMA, UNSPECIFIED EMPHYSEMA TYPE (H): ICD-10-CM

## 2020-09-22 DIAGNOSIS — J44.1 COPD EXACERBATION (H): ICD-10-CM

## 2020-09-22 DIAGNOSIS — Z93.6 S/P ILEAL CONDUIT (H): ICD-10-CM

## 2020-09-22 DIAGNOSIS — J44.9 CHRONIC OBSTRUCTIVE PULMONARY DISEASE, UNSPECIFIED COPD TYPE (H): ICD-10-CM

## 2021-02-18 ENCOUNTER — AMBULATORY - HEALTHEAST (OUTPATIENT)
Dept: NURSING | Facility: CLINIC | Age: 86
End: 2021-02-18

## 2021-03-11 ENCOUNTER — AMBULATORY - HEALTHEAST (OUTPATIENT)
Dept: NURSING | Facility: CLINIC | Age: 86
End: 2021-03-11

## 2021-03-22 ENCOUNTER — OFFICE VISIT - HEALTHEAST (OUTPATIENT)
Dept: FAMILY MEDICINE | Facility: CLINIC | Age: 86
End: 2021-03-22

## 2021-03-22 DIAGNOSIS — C34.12 MALIGNANT NEOPLASM OF UPPER LOBE OF LEFT LUNG (H): ICD-10-CM

## 2021-03-22 DIAGNOSIS — J43.9 PULMONARY EMPHYSEMA, UNSPECIFIED EMPHYSEMA TYPE (H): ICD-10-CM

## 2021-03-22 DIAGNOSIS — D64.9 ANEMIA, UNSPECIFIED TYPE: ICD-10-CM

## 2021-03-22 DIAGNOSIS — E53.8 B12 DEFICIENCY: ICD-10-CM

## 2021-03-22 DIAGNOSIS — Z93.6 S/P ILEAL CONDUIT (H): ICD-10-CM

## 2021-03-22 DIAGNOSIS — I10 ESSENTIAL HYPERTENSION, BENIGN: ICD-10-CM

## 2021-03-22 LAB
ALBUMIN SERPL-MCNC: 3.6 G/DL (ref 3.5–5)
ALP SERPL-CCNC: 61 U/L (ref 45–120)
ALT SERPL W P-5'-P-CCNC: 12 U/L (ref 0–45)
ANION GAP SERPL CALCULATED.3IONS-SCNC: 10 MMOL/L (ref 5–18)
AST SERPL W P-5'-P-CCNC: 20 U/L (ref 0–40)
BILIRUB SERPL-MCNC: 0.4 MG/DL (ref 0–1)
BUN SERPL-MCNC: 28 MG/DL (ref 8–28)
CALCIUM SERPL-MCNC: 8.7 MG/DL (ref 8.5–10.5)
CHLORIDE BLD-SCNC: 111 MMOL/L (ref 98–107)
CO2 SERPL-SCNC: 25 MMOL/L (ref 22–31)
CREAT SERPL-MCNC: 0.85 MG/DL (ref 0.7–1.3)
ERYTHROCYTE [DISTWIDTH] IN BLOOD BY AUTOMATED COUNT: 14.2 % (ref 11–14.5)
GFR SERPL CREATININE-BSD FRML MDRD: >60 ML/MIN/1.73M2
GLUCOSE BLD-MCNC: 96 MG/DL (ref 70–125)
HCT VFR BLD AUTO: 39.1 % (ref 40–54)
HGB BLD-MCNC: 12.5 G/DL (ref 14–18)
MCH RBC QN AUTO: 29.9 PG (ref 27–34)
MCHC RBC AUTO-ENTMCNC: 32 G/DL (ref 32–36)
MCV RBC AUTO: 94 FL (ref 80–100)
PLATELET # BLD AUTO: 185 THOU/UL (ref 140–440)
PMV BLD AUTO: 8.5 FL (ref 7–10)
POTASSIUM BLD-SCNC: 4.5 MMOL/L (ref 3.5–5)
PROT SERPL-MCNC: 6.1 G/DL (ref 6–8)
RBC # BLD AUTO: 4.18 MILL/UL (ref 4.4–6.2)
SODIUM SERPL-SCNC: 146 MMOL/L (ref 136–145)
VIT B12 SERPL-MCNC: 983 PG/ML (ref 213–816)
WBC: 4.9 THOU/UL (ref 4–11)

## 2021-03-22 ASSESSMENT — MIFFLIN-ST. JEOR: SCORE: 1491.39

## 2021-03-23 ENCOUNTER — COMMUNICATION - HEALTHEAST (OUTPATIENT)
Dept: FAMILY MEDICINE | Facility: CLINIC | Age: 86
End: 2021-03-23

## 2021-03-23 DIAGNOSIS — J43.9 PULMONARY EMPHYSEMA, UNSPECIFIED EMPHYSEMA TYPE (H): ICD-10-CM

## 2021-05-26 ENCOUNTER — RECORDS - HEALTHEAST (OUTPATIENT)
Dept: ADMINISTRATIVE | Facility: CLINIC | Age: 86
End: 2021-05-26

## 2021-05-27 NOTE — TELEPHONE ENCOUNTER
Refill Approved    Rx renewed per Medication Renewal Policy. Medication was last renewed on 2/4/19.    Reina Woods, [facility] Triage/Med Refill 4/2/2019     Requested Prescriptions   Pending Prescriptions Disp Refills     PROAIR HFA 90 mcg/actuation inhaler [Pharmacy Med Name: ProAir HFA Inhalation Aerosol Solution 108 (90 Base) MCG/ACT] 8.5 g 0     Sig: INHALE 2 PUFFS EVERY 4 HOURS AS NEEDED FOR WHEEZING OR SHORTNESS OF BREATH.    Albuterol/Levalbuterol Refill Protocol Passed - 4/1/2019  8:15 AM       Passed - PCP or prescribing provider visit in last year    Last office visit with prescriber/PCP: 3/21/2019 Raffi Guevara MD OR same dept: 3/21/2019 Raffi Guevara MD OR same specialty: 3/21/2019 Raffi Guevara MD Last physical: Visit date not found       Next appt within 3 mo: Visit date not found  Next physical within 3 mo: Visit date not found  Prescriber OR PCP: Raffi Guevara MD  Last diagnosis associated with med order: 1. COPD (chronic obstructive pulmonary disease) (H)  - PROAIR HFA 90 mcg/actuation inhaler [Pharmacy Med Name: ProAir HFA Inhalation Aerosol Solution 108 (90 Base) MCG/ACT]; INHALE 2 PUFFS EVERY 4 HOURS AS NEEDED FOR WHEEZING OR SHORTNESS OF BREATH.  Dispense: 8.5 g; Refill: 0    2. COPD exacerbation (H)  - PROAIR HFA 90 mcg/actuation inhaler [Pharmacy Med Name: ProAir HFA Inhalation Aerosol Solution 108 (90 Base) MCG/ACT]; INHALE 2 PUFFS EVERY 4 HOURS AS NEEDED FOR WHEEZING OR SHORTNESS OF BREATH.  Dispense: 8.5 g; Refill: 0    If protocol passes may refill for 6 months if within 3 months of last provider visit (or a total of 9 months). If patient requesting >1 inhaler per month refill x 6 months and have patient make appointment with provider.

## 2021-05-28 NOTE — PROGRESS NOTES
I met with Karen prior to his f/u appt with Dr. Hammond today.  He said he's feeling good now but he felt pretty miserable for a good 6 weeks this past winter.  His primary care provider prescribed prednisone and an antibiotic.  Karen said he's never coughed so much in his life.  He had a lot of mucous with the cough as well.  He's happy to be feeling well again.  Karen said things are going well for him at home.  He denies a need for additional resources at this time.  I will continue to check in and I invited calls as well.

## 2021-05-28 NOTE — PROGRESS NOTES
"Patient here ambulatory for follow up s/p SBRT for lung cancer.  Patient states he had about 6 weeks of a \"lung infection\" and was on steroids during that time.  Now has completely resolved and cough is gone.  Patient feeling much better.  CT scan done in March 2019 and is now here today for results.  Seen by Dr. Hammond.  Plan RTC for follow up as directed by physician.     "

## 2021-05-28 NOTE — PROGRESS NOTES
[facility] Radiation Oncology Follow Up Note    Patient: Karen Santiago  MRN: [id number]  Date of Service: 05/13/2019    Assessment / Impression     1. Lung nodule        Cancer Staging  No matching staging information was found for the patient.  ECOG Peformance Status  ECOG Performance Status: 1  Distress Assessment Score  Distress Assessment Score: No distress  Interventions  Distress Assessment Intervention: Provider Notified  Body site: Thorax    Plan:   84 y.o. male treated with SBRT for an 11 mm lung nodule in posterior lingula presumed to be cancer. Radiation induced COPD exacerbation requiring prednisone, responded well.     1. CT chest w/o contrast consistent with radiation treatment effect + COPD exacerbation. Follow up in 6 months for repeat CT chest w/o contrast and routine office visit.   2. Doing well today without any complaints, lungs clear on exam.    Face to face time  25 minutes with > 75% spent on consultation, education and coordination of care.    Subjective:      HPI: Karen Santiago is a 84 y.o. male who was treated with SBRT for an 11 mm lung nodule in posterior lingula presumed to be cancer.     The patient has a history of urothelial and bladder carcinoma since 2002. Recurrence in 2012 and he underwent a cystoprostatectomy with ileal conduit urinary diversion on 10/15/2012. No neoadjuvant chemotherapy. He had recurrence in the left distal ureter and underwent a left distal ureterectomy with the excision of the cuff and reanastomosis 1/21/2013. 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 He required a stent which was removed.     June 2014 patient was following up with pulmonology as there was some concern for anterior mediastinal lesion, initially thought to be a small benign anterior mediastinal cyst. Noted at that time were numerous small and tiny pulmonary nodules which had been stable.      CT chest on  5/4/2018 showing 11 mm semisolid nodule with 3 mm solid component in the lingula posteriorly which is increased from a 6 x 3 mm ground glass nodule seen previously on 4/11/2017. His other multiple tiny bilateral solid pulmonary nodules remain unchanged     SITE TREATED: DANA  TOTAL DOSE: 5000  NUMBER OF FRACTIONS: 5  DATES COMPLETED: 8/23/2018  CONCURRENT CHEMOTHERAPY: No  ADJUVANT THERAPY:No     He tolerated the treatment without unexpected side effects.     The patient did develop cough in February 2018. He was evaluated by primary and thought to be COPD exacerbation, this resolved with a 5 day  20mg prednisone dose without taper. His CT chest w/o contrast was obtained on 3/6/2019 which did reflect infectious/inflammatory change in LLL. Linear masslike areas of consolidation consistent with radiation changes.     He presents today for routine office visit. He has no complaints today. No cough, improved well after his prednisone.       Current Outpatient Medications   Medication Sig Dispense Refill     amLODIPine (NORVASC) 10 MG tablet TAKE 1 TABLET BY MOUTH EVERY DAY . 90 tablet 1     budesonide-formoterol (SYMBICORT) 160-4.5 mcg/actuation inhaler Inhale 2 puffs 2 (two) times a day. 3 Inhaler 1     losartan (COZAAR) 100 MG tablet TAKE 1 TABLET BY MOUTH EVERY DAY . 90 tablet 1     naproxen sodium (ALEVE) 220 MG tablet Take 220 mg by mouth every 12 (twelve) hours as needed for pain.       PROAIR HFA 90 mcg/actuation inhaler INHALE 2 PUFFS EVERY 4 HOURS AS NEEDED FOR WHEEZING OR SHORTNESS OF BREATH. 8.5 g 4     Current Facility-Administered Medications   Medication Dose Route Frequency Provider Last Rate Last Dose     cyanocobalamin injection 1,000 mcg  1,000 mcg Intramuscular Q30 Days Raffi Guevara MD   1,000 mcg at 04/16/19 0928       The following portions of the patient's history were reviewed and updated as appropriate: allergies, current medications, past family history, past medical history, past social  history, past surgical history and problem list.    Review of Systems    General  Constitutional (WDL): Exceptions to WDL  Hot flashes/Night Sweats: Mild symptoms, no intervention needed(occasionally)  EENT  Eye Disorder (WDL): All eye disorder elements are within defined limits  Ear Disorder (WDL): All ear disorder elements are within defined limits  Respiratory       Respiratory (WDL): Exceptions to WDL  Dyspnea: Shortness of breath with moderate exertion  Cardiovascular  Cardiovascular (WDL): All cardiovascular elements are within defined limits  Endocrine     Gastrointestinal  Gastrointestinal (WDL): All gastrointestinal elements are within defined limits  Musculoskeletal  Musculoskeletal and Connetive Tissue Disorders (WDL): Exceptions to WDL  Arthralgia: Mild pain(arthritic back)  Integumentary               Integumentary (WDL): All integumentary elements are within defined limits  Neurological  Neurosensory (WDL): All neurosensory elements are within defined limits  Psychological/Emotional   Patient Coping: Accepting  Hematological/Lymphatic  Lymph (WDL): All lymph disorder elements are within defined limits  Dermatologic     Genitourinary/Reproductive  Genitourinary (WDL): Exceptions to WDL(ileostomy working well)  Reproductive     Pain              Currently in Pain: Yes  Pain Score (Initial OR Reassessment): No/Denies Pain  Pain Frequency: Intermittent  Location: arthritic back  Pain Intervention(s): Medication (See MAR)  Response to Interventions: good  AUA Assessment                                  Accompanied by  Accompanied by: Alone      Objective:     Physical Exam    Vitals:    05/13/19 1019   BP: 166/71   Pulse: 67   Temp: 97.6  F (36.4  C)   TempSrc: Oral   SpO2: 96%   Weight: 186 lb 9.6 oz (84.6 kg)        GENERAL: no acute distress. Cooperative in conversation.   HEENT: pupils are equal, round and reactive. Oromucosa moist.  RESP: lungs are clear bilaterally per auscultation. Regular  respiratory rate. No wheezes or rhonchi.  CV: Regular, rate and rhythm.   ABD: soft, nontender..   MUSCULOSKELETAL: no palpable points of tenderness   NEURO: non focal. Alert and oriented x3.   PSYCH: within normal limits. No depression or anxiety.  SKIN: warm dry intact   LYMPH: no cervical, supraclavicular lymphadenopathy  EXTREMITIES: no edema     Recent Labs: No results found for this or any previous visit (from the past 168 hour(s)).    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 Completed August 2018  COMPARISON: 11/8/2018 and older studies.  TECHNIQUE: Helical images were obtained through the chest. Multiplanar reformats were obtained. Dose reduction techniques were used.  IV CONTRAST: None.     FINDINGS:   LUNGS AND PLEURA: Patient has developed somewhat plaque-like areas of consolidation about the fiducial markers mainly within the lingula but also posteriorly in the adjacent left lower lobe crossing the fissure. In the lingula soft tissue component   between the air bronchograms measures up to 1.4 cm. Posteriorly in the left lower lobe there is minimal surrounding groundglass opacities. In addition in the posterior basilar segments of the left lower lobe patient's developed areas of peribronchial   thickening and focal nodular opacities (series 3 images 91-1 18). Largest of these measures 9 x 5 mm (image 94). Few other scattered tiny pulmonary nodules in both lungs predominantly along the fissures are stable. Minimal retained secretions along the   right side of the distal trachea. Small calcified pleural plaques noted. No effusions.     MEDIASTINUM: No suspicious adenopathy. Mild coronary artery calcifications.     LIMITED UPPER ABDOMEN: Numerous layering gallstones and liver and renal cysts again noted. Small right adrenal adenoma again noted.     MUSCULOSKELETAL: No suspicious  lesions.     IMPRESSION:   CONCLUSION:   1.  Patient has developed peribronchial thickening, minimal areas of retained secretions and scattered nodular opacities in the posterior basilar segments of the left lower lobe. Would favor an infectious/inflammatory change such as atypical   fungal/mycobacterial infection. The largest of the nodules measures 9 x 5 mm and these can be followed til they clear.  2.  Along the superior margin of this, adjacent to the fiducial markers patient's also developed linear masslike areas of consolidation with few air bronchograms especially in the lingula. Findings suggest evolving SBRT change in the radiation fields.  3.   Other noncritical findings as noted above are stable.     NOTE: ABNORMAL REPORT     THE DICTATION ABOVE DESCRIBES AN ABNORMALITY FOR WHICH FOLLOW-UP IS NEEDED.     I, Alesia Hammond MD personally performed the services described in this documentation, as scribed by Behzad Plummer in my presence, and it is both accurate and complete.    Signed by: Alesia Hammond MD, MPH

## 2021-05-29 NOTE — PROGRESS NOTES
Assessment:  1.  Hypertension, controlled.  2.  B12 deficiency.  3.  Chronic obstructive pulmonary disease.  4.  Right knee pain consistent with early osteoarthritis.  5.  Cough basically resolved.  6.  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    Plan: Check basic profile and fasting B12 level.  Given his B12 monthly injection.  Explained that he probably needs to keep that up with depending on the blood level, it might be an option to try oral medication and follow his levels closely.  At minimum follow-up in 6 months.  Call or return earlier as needed.    Subjective: 84-year-old male presenting for follow-up on the above.  He is generally feeling good.  Regarding hypertension no headaches or dizziness or leg swelling.  He had his ileal conduit done back in about 2002 and it was 6 months after that he was started on the B12 shots.  He was told he would need those lifelong.  He has had some pain in the right knee that typically gets better when he is more active.  He can be stiff and sore at first with starting in the morning.  He does note that he only has a mild cough once or twice a week now.  He did have follow-up with Dr. Hammond and see that note.  Patient Active Problem List   Diagnosis     Primary Osteoarthritis Of The Lumbar Vertebrae     Obesity     B12 deficiency     Benign Essential Hypertension     Squamous Cell Carcinoma Of The Skin     S/P ileal conduit (H)     COPD (chronic obstructive pulmonary disease) (H)     (Presumed) malignant neoplasm of upper lobe of left lung (H)     Malignant neoplasm of bladder (H)     Malignant neoplasm of ureter (H)     Past Medical History:   Diagnosis Date     COPD (chronic obstructive pulmonary disease) (H)      Rheumatoid arthritis (H)      No Known Allergies  Current Outpatient Medications   Medication Sig Dispense Refill     amLODIPine (NORVASC) 10 MG tablet TAKE 1 TABLET BY MOUTH EVERY DAY . 90  "tablet 1     budesonide-formoterol (SYMBICORT) 160-4.5 mcg/actuation inhaler Inhale 2 puffs 2 (two) times a day. 3 Inhaler 1     losartan (COZAAR) 100 MG tablet TAKE 1 TABLET BY MOUTH EVERY DAY . 90 tablet 1     naproxen sodium (ALEVE) 220 MG tablet Take 220 mg by mouth every 12 (twelve) hours as needed for pain.       PROAIR HFA 90 mcg/actuation inhaler INHALE 2 PUFFS EVERY 4 HOURS AS NEEDED FOR WHEEZING OR SHORTNESS OF BREATH. 8.5 g 4     Current Facility-Administered Medications   Medication Dose Route Frequency Provider Last Rate Last Dose     cyanocobalamin injection 1,000 mcg  1,000 mcg Intramuscular Q30 Days Raffi Guevara MD   1,000 mcg at 05/14/19 0919     All other review of systems are negative for any other changes.    Objective:/70   Pulse 65   Ht 5' 9\" (1.753 m)   Wt 185 lb (83.9 kg)   SpO2 97%   BMI 27.32 kg/m    HEENT shows no acute change.  Lungs clear.  Heart regular rate and rhythm.  Abdomen shows no masses tenderness or paraspinal megaly.  No pedal edema  Is alert with clear speech.  "

## 2021-05-30 ENCOUNTER — RECORDS - HEALTHEAST (OUTPATIENT)
Dept: ADMINISTRATIVE | Facility: CLINIC | Age: 86
End: 2021-05-30

## 2021-05-30 NOTE — TELEPHONE ENCOUNTER
Medication Question or Clarification  Who is calling: Pharmacy: [facility] [place] (Fax received)  What medication are you calling about? (include dose and sig)    Disp Refills Start End    SYMBICORT 160-4.5 mcg/actuation inhaler 1 Inhaler 5 7/26/2019     Sig: Inhale 2 puffs 2 (two) times a day.    Sent to pharmacy as: SYMBICORT 160-4.5 mcg/actuation inhaler      Who prescribed the medication?: Raffi Guevara MD  What is your question/concern?: Pharmacy requesting a 3 month supply of the above medication.  Pharmacy: [facility] [place]  Okay to leave a detailed message?: No  Site CMT - Please call the pharmacy to obtain any additional needed information.

## 2021-05-30 NOTE — TELEPHONE ENCOUNTER
RN cannot approve Refill Request    RN can NOT refill this medication med is not covered by policy/route to provider     . Last office visit: 6/12/2019 Raffi Guevara MD Last Physical: Visit date not found Last MTM visit: Visit date not found Last visit same specialty: 6/12/2019 Raffi Guevara MD.  Next visit within 3 mo: Visit date not found  Next physical within 3 mo: Visit date not found      Reina Woods, [facility] Triage/Med Refill 7/26/2019    Requested Prescriptions   Pending Prescriptions Disp Refills     SYMBICORT 160-4.5 mcg/actuation inhaler [Pharmacy Med Name: Symbicort Inhalation Aerosol 160-4.5 MCG/ACT]  0     Sig: Inhale 2 puffs 2 (two) times a day.       There is no refill protocol information for this order

## 2021-05-31 ENCOUNTER — RECORDS - HEALTHEAST (OUTPATIENT)
Dept: ADMINISTRATIVE | Facility: CLINIC | Age: 86
End: 2021-05-31

## 2021-05-31 VITALS — WEIGHT: 187.4 LBS | BODY MASS INDEX: 26.89 KG/M2

## 2021-05-31 VITALS — BODY MASS INDEX: 26.6 KG/M2 | WEIGHT: 185.4 LBS

## 2021-06-01 ENCOUNTER — RECORDS - HEALTHEAST (OUTPATIENT)
Dept: ADMINISTRATIVE | Facility: CLINIC | Age: 86
End: 2021-06-01

## 2021-06-01 VITALS — BODY MASS INDEX: 26.99 KG/M2 | WEIGHT: 188.1 LBS

## 2021-06-01 VITALS — BODY MASS INDEX: 26.86 KG/M2 | WEIGHT: 187.2 LBS

## 2021-06-01 VITALS — WEIGHT: 184 LBS | HEIGHT: 70 IN | BODY MASS INDEX: 26.34 KG/M2

## 2021-06-01 VITALS — HEIGHT: 71 IN | WEIGHT: 182.06 LBS | BODY MASS INDEX: 25.49 KG/M2

## 2021-06-01 VITALS — WEIGHT: 185.6 LBS | BODY MASS INDEX: 25.89 KG/M2

## 2021-06-01 NOTE — TELEPHONE ENCOUNTER
Refill Approved    Rx renewed per Medication Renewal Policy. Medication was last renewed on 3/21/19.    Reina Woods, [facility] Triage/Med Refill 9/27/2019     Requested Prescriptions   Pending Prescriptions Disp Refills     amLODIPine (NORVASC) 10 MG tablet [Pharmacy Med Name: amLODIPine Besylate Oral Tablet 10 MG] 90 tablet 0     Sig: TAKE ONE TABLET BY MOUTH ONE TIME DAILY       Calcium-Channel Blockers Protocol Passed - 9/26/2019 10:49 AM        Passed - PCP or prescribing provider visit in past 12 months or next 3 months     Last office visit with prescriber/PCP: 6/12/2019 Raffi Guevara MD OR same dept: 6/12/2019 Raffi Guevara MD OR same specialty: 6/12/2019 Raffi Guevara MD  Last physical: Visit date not found Last MTM visit: Visit date not found   Next visit within 3 mo: Visit date not found  Next physical within 3 mo: Visit date not found  Prescriber OR PCP: Raffi Guevara MD  Last diagnosis associated with med order: 1. Benign Essential Hypertension  - amLODIPine (NORVASC) 10 MG tablet [Pharmacy Med Name: amLODIPine Besylate Oral Tablet 10 MG]; TAKE ONE TABLET BY MOUTH ONE TIME DAILY   Dispense: 90 tablet; Refill: 0    If protocol passes may refill for 12 months if within 3 months of last provider visit (or a total of 15 months).             Passed - Blood pressure filed in past 12 months     BP Readings from Last 1 Encounters:   06/12/19 130/70

## 2021-06-01 NOTE — TELEPHONE ENCOUNTER
Reviewed with him the recent news reports regarding a particular generic manufacture and the chance of contamination of certain lots of the losartan medication.  Explained he would be notified by his pharmacy if his medicine was a part of 1 of those slots.  At this point continue his losartan medication as he is doing and follow-up in December.  Explained that lorenzo also has nitrosamines that are a risk factor for cancer.

## 2021-06-02 ENCOUNTER — RECORDS - HEALTHEAST (OUTPATIENT)
Dept: ADMINISTRATIVE | Facility: CLINIC | Age: 86
End: 2021-06-02

## 2021-06-02 VITALS — WEIGHT: 181 LBS | BODY MASS INDEX: 25.34 KG/M2 | HEIGHT: 71 IN

## 2021-06-02 VITALS — WEIGHT: 184 LBS | BODY MASS INDEX: 25.66 KG/M2

## 2021-06-02 VITALS — BODY MASS INDEX: 26.08 KG/M2 | WEIGHT: 187 LBS

## 2021-06-02 VITALS — WEIGHT: 185 LBS | BODY MASS INDEX: 25.8 KG/M2

## 2021-06-02 VITALS — WEIGHT: 186.9 LBS | BODY MASS INDEX: 26.07 KG/M2

## 2021-06-02 VITALS — WEIGHT: 182 LBS | BODY MASS INDEX: 25.38 KG/M2

## 2021-06-02 NOTE — PROGRESS NOTES
Assessment:  1.  COPD exacerbation.  Underlying COPD.    Plan: Prescribed prednisone 20 mg-1 p.o. daily-#5.  Prescribed azithromycin to 50 mg-#6-2 p.o. today then 1 p.o. daily on days 2 through 5.  Renewed Proventil inhaler-2 puffs every 4 hours as needed refillable x1.  Follow-up at minimum in 1 month if these doing well for his regular 6-month med check but return earlier if he is not making good progress.  Follow through with his follow-up visit with Dr. Hammond in several weeks.    Subjective: 85-year-old male presenting for evaluation of recent difficulty in the last 7 to 8 days with increased cough and congestion and sputum production.  He states it feels like it did last winter.  He is using the Symbicort inhaler regular on a twice a day basis.  But he has not been using the pro-air inhaler.  Is on the other medication as listed.  He denies any fever.  He notes the phlegm that he brings up is mostly been clear.  But he definitely has more cough and congestion when that had cleared up after the treatment last spring.  He denies any chest pain nausea or vomiting.  Past Medical History:   Diagnosis Date     COPD (chronic obstructive pulmonary disease) (H)      Rheumatoid arthritis (H)      No Known Allergies  Current Outpatient Medications on File Prior to Visit   Medication Sig Dispense Refill     amLODIPine (NORVASC) 10 MG tablet TAKE ONE TABLET BY MOUTH ONE TIME DAILY 90 tablet 2     budesonide-formoterol (SYMBICORT) 160-4.5 mcg/actuation inhaler Inhale 2 puffs 2 (two) times a day. 3 Inhaler 2     losartan (COZAAR) 100 MG tablet TAKE 1 TABLET BY MOUTH EVERY DAY . 90 tablet 1     naproxen sodium (ALEVE) 220 MG tablet Take 220 mg by mouth every 12 (twelve) hours as needed for pain.       [DISCONTINUED] PROAIR HFA 90 mcg/actuation inhaler INHALE 2 PUFFS EVERY 4 HOURS AS NEEDED FOR WHEEZING OR SHORTNESS OF BREATH. 8.5 g 4     Current Facility-Administered Medications on File Prior to Visit   Medication Dose Route  Frequency Provider Last Rate Last Dose     cyanocobalamin injection 1,000 mcg  1,000 mcg Intramuscular Q30 Days Raffi Guevara MD   1,000 mcg at 10/15/19 0858     All other review of systems are negative.    Objective:/60   Pulse 70   Temp 98  F (36.7  C) (Oral)   Resp 20   Wt 183 lb (83 kg)   SpO2 97%   BMI 27.02 kg/m    HEENT examination shows no acute change.  Neck supple.  Lungs do show expiratory wheezes and rhonchi.  No respiratory distress.  This changes are diffuse.  Heart regular rate and rhythm.  Abdomen shows no masses tenderness or hepatosplenomegaly.  No pedal edema.  He is alert with clear speech.

## 2021-06-03 VITALS
DIASTOLIC BLOOD PRESSURE: 64 MMHG | OXYGEN SATURATION: 96 % | BODY MASS INDEX: 27.02 KG/M2 | SYSTOLIC BLOOD PRESSURE: 130 MMHG | RESPIRATION RATE: 18 BRPM | WEIGHT: 183 LBS | TEMPERATURE: 98 F | HEART RATE: 70 BPM

## 2021-06-03 VITALS
OXYGEN SATURATION: 97 % | TEMPERATURE: 97.9 F | WEIGHT: 184.2 LBS | BODY MASS INDEX: 27.2 KG/M2 | HEART RATE: 71 BPM | SYSTOLIC BLOOD PRESSURE: 142 MMHG | DIASTOLIC BLOOD PRESSURE: 65 MMHG

## 2021-06-03 VITALS
OXYGEN SATURATION: 97 % | HEART RATE: 70 BPM | SYSTOLIC BLOOD PRESSURE: 120 MMHG | BODY MASS INDEX: 27.02 KG/M2 | RESPIRATION RATE: 20 BRPM | DIASTOLIC BLOOD PRESSURE: 60 MMHG | WEIGHT: 183 LBS | TEMPERATURE: 98 F

## 2021-06-03 VITALS — BODY MASS INDEX: 27.4 KG/M2 | HEIGHT: 69 IN | WEIGHT: 185 LBS

## 2021-06-03 VITALS — WEIGHT: 186.6 LBS | BODY MASS INDEX: 26.03 KG/M2

## 2021-06-03 NOTE — PROGRESS NOTES
[facility] Radiation Oncology Follow Up Note    Patient: Karen Santiago  MRN: [id number]  Date of Service: 11/14/2019    Assessment / Impression     1. 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 male treated with SBRT for an 11 mm lung presumed NSCLC in posterior lingula.  Recent COPD exacerbation, complete resolution with steroids and abx.     1. CT chest 10/13/2019 consistent with radiation treatment effect, no evidence of recurrent or new disease.   2. Follow up in 6 months for repeat CT chest without contrast..   3. Lungs clear on exam today.       Face to face time  25 minutes with > 75% spent on consultation, education and coordination of care.    Subjective:      HPI: Karne Santiago is a 85 y.o. male who was treated with SBRT for an 11 mm lung nodule in posterior lingula presumed to be cancer.     The patient has a history of urothelial and bladder carcinoma since 2002. Recurrence in 2012 and he underwent a cystoprostatectomy with ileal conduit urinary diversion on 10/15/2012. No neoadjuvant chemotherapy. He had recurrence in the left distal ureter and underwent a left distal ureterectomy with the excision of the cuff and reanastomosis 1/21/2013. 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 He required a stent which was removed.     June 2014 patient was following up with pulmonology as there was some concern for anterior mediastinal lesion, initially thought to be a small  benign anterior mediastinal cyst. Noted at that time were numerous small and tiny pulmonary nodules which had been stable.      CT chest on 5/4/2018 showing 11 mm semisolid nodule with 3 mm solid component in the lingula posteriorly which is increased from a 6 x 3 mm ground glass nodule seen previously on 4/11/2017. His other multiple tiny bilateral solid pulmonary nodules remain unchanged     SITE TREATED: DANA  TOTAL DOSE: 5000  NUMBER OF FRACTIONS: 5  DATES COMPLETED: 8/23/2018  CONCURRENT CHEMOTHERAPY: No  ADJUVANT THERAPY:No     He tolerated the treatment without unexpected side effects.     The patient presents today for routine office. He recently had a COPD exacerbation, finished 20mg prednisone x 5 days and azithromycin 2 weeks ago. He feels back to his baseline and has no complaints. No worsening shortness of breath, fevers, productive cough.     Current Outpatient Medications   Medication Sig Dispense Refill     albuterol (PROAIR HFA) 90 mcg/actuation inhaler Inhale 2 puffs every 4 (four) hours as needed for wheezing. 8.5 g 1     amLODIPine (NORVASC) 10 MG tablet TAKE ONE TABLET BY MOUTH ONE TIME DAILY 90 tablet 2     budesonide-formoterol (SYMBICORT) 160-4.5 mcg/actuation inhaler Inhale 2 puffs 2 (two) times a day. 3 Inhaler 2     losartan (COZAAR) 100 MG tablet TAKE 1 TABLET BY MOUTH EVERY DAY . 90 tablet 1     naproxen sodium (ALEVE) 220 MG tablet Take 220 mg by mouth every 12 (twelve) hours as needed for pain.       Current Facility-Administered Medications   Medication Dose Route Frequency Provider Last Rate Last Dose     cyanocobalamin injection 1,000 mcg  1,000 mcg Intramuscular Q30 Days Raffi Guevara MD   1,000 mcg at 11/12/19 0859       The following portions of the patient's history were reviewed and updated as appropriate: allergies, current medications, past family history, past medical history, past social history, past surgical history and problem list.    Review of  Systems    General  Constitutional (WDL): Exceptions to WDL  Hot flashes/Night Sweats: Mild symptoms, no intervention needed(occasionally)  EENT  Eye Disorder (WDL): All eye disorder elements are within defined limits  Respiratory       Respiratory (WDL): Exceptions to WDL  Dyspnea: Shortness of breath with moderate exertion  Cardiovascular  Cardiovascular (WDL): All cardiovascular elements are within defined limits  Endocrine     Gastrointestinal  Gastrointestinal (WDL): All gastrointestinal elements are within defined limits  Musculoskeletal  Musculoskeletal and Connetive Tissue Disorders (WDL): Exceptions to WDL  Arthralgia: Mild pain(arthritic back)  Integumentary               Integumentary (WDL): All integumentary elements are within defined limits  Neurological  Neurosensory (WDL): All neurosensory elements are within defined limits  Psychological/Emotional   Patient Coping: Accepting  Hematological/Lymphatic  Lymph (WDL): All lymph disorder elements are within defined limits  Dermatologic     Genitourinary/Reproductive  Genitourinary (WDL): Exceptions to WDL(ileostomy)  Reproductive     Pain              Currently in Pain: Yes  Pain Score (Initial OR Reassessment): No/Denies Pain  Pain Frequency: Intermittent  Location: arthritic back  Pain Intervention(s): Home medication  Response to Interventions: good  AUA Assessment                                  Accompanied by  Accompanied by: Alone      Objective:     Physical Exam    Vitals:    11/14/19 0940   BP: 142/65   Pulse: 71   Temp: 97.9  F (36.6  C)   TempSrc: Oral   SpO2: 97%   Weight: 184 lb 3.2 oz (83.6 kg)        GENERAL: no acute distress. Cooperative in conversation.   HEENT:  Oromucosa moist.  RESP: lungs sounds clear throughout. Regular respiratory rate. No wheezes or rhonchi.  CV: Regular, rate and rhythm.   ABD: soft, nontender..   MUSCULOSKELETAL: no palpable points of tenderness,   NEURO: non focal. Alert and oriented x3.   PSYCH: within normal  limits. No depression or anxiety.  SKIN: warm dry intact   LYMPH: no cervical, supraclavicular lymphadenopathy    Recent Labs: No results found for this or any previous visit (from the past 168 hour(s)).    Imaging: Imaging results 30 days: Ct Chest Without Contrast    Result Date: 11/13/2019  EXAM: CT CHEST WO CONTRAST LOCATION: [facility] DATE/TIME: 11/13/2019 11:37 AM INDICATION: Neoplasm: chest, lung, treatment monitor or follow up status post SBRT (8/23/2018) for 11 mm lung nodule, routine follow up. COMPARISON: 3/6/2019, 11/8/2018. TECHNIQUE: CT chest without IV contrast. Multiplanar reformats were obtained. Dose reduction techniques were used. CONTRAST: None. FINDINGS: LUNGS AND PLEURA: Fiducial markers in the lingula. Evolving radiation fibrosis, with decreased consolidation compared to previous. Tiny 2 mm left lower lobe nodule and small nodules along the left major fissure are unchanged from previous and should be benign. 3 mm right lower lobe nodule on image 113 and 4 mm nodule image 136 are also unchanged. Minimal scarring medial basal right lower lobe. Stable calcified and noncalcified pleural plaques bilaterally. MEDIASTINUM/AXILLAE: No lymphadenopathy. UPPER ABDOMEN: Stable right adrenal adenoma measuring -14 Hounsfield units. Benign hepatic cysts. Cholelithiasis. MUSCULOSKELETAL: Unremarkable.     1.  Evolving postradiation fibrosis in the left lung. 2.  No evidence of recurrent or metastatic disease in the chest.       I, Alesia Hammond MD personally performed the services described in this documentation, as scribed by Behzad Plummer in my presence, and it is both accurate and complete.    Signed by: Alesia Hammond MD, MPH      Skin Substitute Text: The defect edges were debeveled with a #15 scalpel blade.  Given the location of the defect, shape of the defect and the proximity to free margins a skin substitute graft was deemed most appropriate.  The graft material was trimmed to fit the size of the defect. The graft was then placed in the primary defect and oriented appropriately.

## 2021-06-03 NOTE — TELEPHONE ENCOUNTER
Pt advised to be seen by triage to be seen -Pt requesting refill only-   He has appt WED 11/6/19    Progress Notes        Assessment:  1.  COPD exacerbation.  Underlying COPD.     Plan: Prescribed prednisone 20 mg-1 p.o. daily-#5.  Prescribed azithromycin to 50 mg-#6-2 p.o. today then 1 p.o. daily on days 2 through 5.  Renewed Proventil inhaler-2 puffs every 4 hours as needed refillable x1.  Follow-up at minimum in 1 month if these doing well for his regular 6-month med check but return earlier if he is not making good progress.  Follow through with his follow-up visit with Dr. Hammond in several weeks.

## 2021-06-03 NOTE — PROGRESS NOTES
Assessment:  1.  COPD exacerbation.  2.  Hypertension controlled.    Plan: Did prescribe the prednisone 20 mg ##5-1 p.o. daily.  Continue his Symbicort inhaler and use the albuterol as needed.  Call me with status per report next Wednesday.  If he has flareup again when he goes off the oral steroid, would then recommend use of about 2 weeks of oral steroid.  He will follow through with Dr. Hammond for follow-up of his lung nodule, presumed cancer.  Follow-up to see me in 3 to 4 months if doing well overall.    Subjective: 85-year-old male presents for follow-up regarding the cough and sputum etc.  He took both the azithromycin and the prednisone from Monday, October 28 through Friday, November 1.  Within a day or 2 he felt like his cough was gone and he was much better.  He was okay for 3 days and then Monday night he notes his cough and the sputum returned, no fever, no coloring to the phlegm, but he feels like he is back to square one.  Past Medical History:   Diagnosis Date     COPD (chronic obstructive pulmonary disease) (H)      Rheumatoid arthritis (H)      No Known Allergies  Current Outpatient Medications on File Prior to Visit   Medication Sig Dispense Refill     albuterol (PROAIR HFA) 90 mcg/actuation inhaler Inhale 2 puffs every 4 (four) hours as needed for wheezing. 8.5 g 1     amLODIPine (NORVASC) 10 MG tablet TAKE ONE TABLET BY MOUTH ONE TIME DAILY 90 tablet 2     budesonide-formoterol (SYMBICORT) 160-4.5 mcg/actuation inhaler Inhale 2 puffs 2 (two) times a day. 3 Inhaler 2     losartan (COZAAR) 100 MG tablet TAKE 1 TABLET BY MOUTH EVERY DAY . 90 tablet 1     naproxen sodium (ALEVE) 220 MG tablet Take 220 mg by mouth every 12 (twelve) hours as needed for pain.       predniSONE (DELTASONE) 20 MG tablet Take 20 mg by mouth daily. 5 tablet 0     Current Facility-Administered Medications on File Prior to Visit   Medication Dose Route Frequency Provider Last Rate Last Dose     cyanocobalamin injection 1,000  mcg  1,000 mcg Intramuscular Q30 Days Raffi Guevara MD   1,000 mcg at 10/15/19 0858     All other review of systems are negative.    Objective:/64   Pulse 70   Temp 98  F (36.7  C) (Oral)   Resp 18   Wt 183 lb (83 kg)   SpO2 96%   BMI 27.02 kg/m    HEENT examination is negative.  Neck supple without adenopathy.  Lungs sound clear with a few scattered rhonchi and very faint wheeze.  Heart regular rate and rhythm without murmur.  No pedal edema.  Is alert with clear speech.

## 2021-06-03 NOTE — PROGRESS NOTES
Patient here ambulatory for follow up s/p radiation for his lung nodule.  Patient states he recently has had a productive cough and was put on short course of steroids by his PCP.  Prednisone has completed now and patient states he is feeling well, cough is gone.  CT scan done yesterday.  Here today for results.  Seen by Dr. Hammond.  Plan RTC for follow up as directed by physician.

## 2021-06-03 NOTE — TELEPHONE ENCOUNTER
FYI - Status Update  Who is Calling: Patient  Update: Patient stated that he is doing much better now from last seen in clinic. Patient stated that Raffi Guevara MD had mention to give him a call on this information update and that he will see him the next time.   Okay to leave a detailed message?:  No return call needed

## 2021-06-04 VITALS
HEART RATE: 65 BPM | SYSTOLIC BLOOD PRESSURE: 146 MMHG | OXYGEN SATURATION: 97 % | BODY MASS INDEX: 27.02 KG/M2 | WEIGHT: 183 LBS | DIASTOLIC BLOOD PRESSURE: 70 MMHG | RESPIRATION RATE: 22 BRPM

## 2021-06-04 VITALS
TEMPERATURE: 97.7 F | SYSTOLIC BLOOD PRESSURE: 103 MMHG | DIASTOLIC BLOOD PRESSURE: 65 MMHG | WEIGHT: 180 LBS | BODY MASS INDEX: 26.58 KG/M2 | OXYGEN SATURATION: 95 % | HEART RATE: 79 BPM

## 2021-06-05 VITALS
HEART RATE: 69 BPM | DIASTOLIC BLOOD PRESSURE: 60 MMHG | SYSTOLIC BLOOD PRESSURE: 124 MMHG | OXYGEN SATURATION: 96 % | RESPIRATION RATE: 16 BRPM | BODY MASS INDEX: 27.05 KG/M2 | WEIGHT: 183.19 LBS

## 2021-06-05 VITALS
OXYGEN SATURATION: 98 % | SYSTOLIC BLOOD PRESSURE: 120 MMHG | WEIGHT: 181 LBS | HEART RATE: 65 BPM | DIASTOLIC BLOOD PRESSURE: 80 MMHG | BODY MASS INDEX: 26.81 KG/M2 | HEIGHT: 69 IN

## 2021-06-06 NOTE — PROGRESS NOTES
"Karen Santiago is a 85 y.o. male who is being evaluated via a billable telephone visit.      The patient has been notified of following:     \"This telephone visit will be conducted via a call between you and your physician/provider. We have found that certain health care needs can be provided without the need for a physical exam.  This service lets us provide the care you need with a short phone conversation.  If a prescription is necessary we can send it directly to your pharmacy.  If lab work is needed we can place an order for that and you can then stop by our lab to have the test done at a later time.    If during the course of the call the physician/provider feels a telephone visit is not appropriate, you will not be charged for this service.\"         Karen Santiago complains of    Chief Complaint   Patient presents with     Medication Management     does he need b 12     Medication Refill     amlodipine & losartan       I have reviewed and updated the patient's Past Medical History, Social History, Family History and Medication List.  Regarding hypertension, he has not had any headaches or dizziness or leg swelling.  He has not had any blood pressure checked since his last visit here.  Regarding chronic obstructive pulmonary disease, he uses his Symbicort on a regular basis but has not needed to use the albuterol inhaler.  He had the CAT scan of the lung in November which did not show any recurrence.  He has the ileal conduit because of the history of the bladder surgery and has had B12 shots for that.  ALLERGIES  Patient has no known allergies.    Denisse Craig CMA (MA signature)    Additional provider notes: No acute issue occurring at this time.  I refilled both his amlodipine and losartan prescriptions.  Assessment/Plan:  1. Benign Essential Hypertension     2. S/P ileal conduit (H)     3. Pulmonary emphysema, unspecified emphysema type (H)     4. (Presumed) malignant neoplasm of upper lobe of " left lung (H)     5. B12 deficiency        1. Benign Essential Hypertension     2. S/P ileal conduit (H)     3. Pulmonary emphysema, unspecified emphysema type (H)     4. (Presumed) malignant neoplasm of upper lobe of left lung (H)     5. B12 deficiency       Given the current coronavirus pandemic, the clinic policy has been for him to not come in for physical exam since he is feeling okay.  I did explain that if he developed any symptoms of any significance she should contact us for triage and deciding how to handle things.  If the pandemic settles down, we could see him for visit in about 3 months.  In the meantime he can take vitamin B12 orally and a dose of 1 mg daily which can be obtained over-the-counter and even if that was not absorbed well he should have sufficient B12 in his system and I explained how the liver store that for significant time.    I have reviewed the note as documented above.  This accurately captures the substance of my conversation with the patient.      Phone call contact time    Call Started at:   9:40  Call Ended at: 9:52      Signature:  Raffi Guevara

## 2021-06-08 NOTE — TELEPHONE ENCOUNTER
RN cannot approve Refill Request    RN can NOT refill this medication med is not covered by policy/route to provider     . Last office visit: 11/6/2019 Raffi Guevara MD Last Physical: Visit date not found Last MTM visit: Visit date not found Last visit same specialty: 11/6/2019 Raffi Guevara MD.  Next visit within 3 mo: Visit date not found  Next physical within 3 mo: Visit date not found      Reina Woods, [facility] Triage/Med Refill 5/28/2020    Requested Prescriptions   Pending Prescriptions Disp Refills     SYMBICORT 160-4.5 mcg/actuation inhaler [Pharmacy Med Name: Symbicort Inhalation Aerosol 160-4.5 MCG/ACT] 1 Inhaler 11     Sig: Inhale 2 puffs by mouth 2 (two) times a day.       There is no refill protocol information for this order

## 2021-06-08 NOTE — TELEPHONE ENCOUNTER
(Note to staff- I did set up separate message to staff before doing this telephone note-  See that-  Just wanted to make sure he is contacted to schedule appt.)    Reviewed that I was retiring this summer.  He notes he has a skin lesion on his left cheek that he would like to see me for.  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  He states it is a small area and not at all close to his eye.  I will have staff call him to schedule face-to-face visit.  He would like to have B12 level checked- so I asked him to be fasting for visit-  He is taking B12 pill daily.

## 2021-06-09 NOTE — PROGRESS NOTES
[facility] Radiation Oncology Follow Up Note    Patient: Karen Santiago  MRN: [id number]  Date of Service: 07/14/2020    Assessment:     1. 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 male treated with SBRT for an 11 mm lung presumed NSCLC in posterior lingula, treated 5000 cGy/5 fx finished on 8/23/2018. Previous COPD exacerbation, complete resolution with steroids and abx.     1. Reviewed CT chest without contrast on 7/6/2020. No evidence of new or recurrent disease. Discussed results with patient and he is reassured.  2. Nearly 2 years out, patient will return in 1 year for CT chest w/o contrast with office visit.   3. Provided with aquaphor to use on recent biopsy sites.     Face to face time  25 minutes with > 75% spent on consultation, education and coordination of care.    Subjective:      HPI: Karen Santiago is a 85 y.o. male who was treated with SBRT for an 11 mm lung nodule in posterior lingula presumed to be cancer.     The patient has a history of urothelial and bladder carcinoma since 2002. Recurrence in 2012 and he underwent a cystoprostatectomy with ileal conduit urinary diversion on 10/15/2012. No neoadjuvant chemotherapy. He had recurrence in the left distal ureter and underwent a left distal ureterectomy with the excision of the cuff and reanastomosis 1/21/2013. 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 He required a stent which was removed.     June 2014 patient was following up with pulmonology as there was some  concern for anterior mediastinal lesion, initially thought to be a small benign anterior mediastinal cyst. Noted at that time were numerous small and tiny pulmonary nodules which had been stable.      CT chest on 5/4/2018 showing 11 mm semisolid nodule with 3 mm solid component in the lingula posteriorly which is increased from a 6 x 3 mm ground glass nodule seen previously on 4/11/2017. His other multiple tiny bilateral solid pulmonary nodules remain unchanged     SITE TREATED: DANA  TOTAL DOSE: 5000  NUMBER OF FRACTIONS: 5  DATES COMPLETED: 8/23/2018  CONCURRENT CHEMOTHERAPY: No  ADJUVANT THERAPY:No     He tolerated the treatment without unexpected side effects.     The patient presents to clinic for imaging results and routine office visit. He recently had some sites on face and ear biopsied by derm which were negative. Otherwise no complaints. Has symbicort for breathing treatment which works well.     Current Outpatient Medications   Medication Sig Dispense Refill     albuterol (PROAIR HFA) 90 mcg/actuation inhaler Inhale 2 puffs every 4 (four) hours as needed for wheezing. 8.5 g 1     amLODIPine (NORVASC) 10 MG tablet TAKE ONE TABLET BY MOUTH ONE TIME DAILY 90 tablet 1     budesonide-formoteroL (SYMBICORT) 160-4.5 mcg/actuation inhaler Inhale 2 puffs 2 (two) times a day. 3 Inhaler 1     losartan (COZAAR) 100 MG tablet TAKE 1 TABLET BY MOUTH EVERY DAY . 90 tablet 1     naproxen sodium (ALEVE) 220 MG tablet Take 220 mg by mouth every 12 (twelve) hours as needed for pain.       Current Facility-Administered Medications   Medication Dose Route Frequency Provider Last Rate Last Dose     cyanocobalamin injection 1,000 mcg  1,000 mcg Intramuscular Once Nico Seymour CNP           The following portions of the patient's history were reviewed and updated as appropriate: allergies, current medications, past family history, past medical history, past social history, past surgical history and problem list.    Review of  Systems    General  Constitutional (WDL): All constitutional elements are within defined limits  EENT  Eye Disorder (WDL): All eye disorder elements are within defined limits  Ear Disorder (WDL): Exceptions to WDL([place], uses aides)  Respiratory       Respiratory (WDL): Exceptions to WDL  Cough: Mild symptoms, nonprescription intervention indicated  Dyspnea: Shortness of breath with moderate exertion  Cardiovascular  Cardiovascular (WDL): All cardiovascular elements are within defined limits  Endocrine     Gastrointestinal  Gastrointestinal (WDL): All gastrointestinal elements are within defined limits  Musculoskeletal  Musculoskeletal and Connetive Tissue Disorders (WDL): Exceptions to WDL  Arthralgia: Mild pain  Integumentary               Integumentary (WDL): Exceptions to WDL(healing wound L ear, seeing Derm.)  Neurological  Neurosensory (WDL): All neurosensory elements are within defined limits  Psychological/Emotional   Patient Coping: Accepting;Open/discussion  Hematological/Lymphatic  Lymph (WDL): All lymph disorder elements are within defined limits  Dermatologic     Genitourinary/Reproductive  Genitourinary (WDL): All genitourinary elements are within defined limits  Reproductive     Pain              Currently in Pain: No/denies                          Accompanied by  Accompanied by: Alone      Objective:     Physical Exam    Vitals:    07/14/20 1256   BP: 103/65   Pulse: 79   Temp: 97.7  F (36.5  C)   TempSrc: Oral   SpO2: 95%   Weight: 180 lb (81.6 kg)        General: Alert and oriented. Sitting comfortably.   Patient non acute appearing, asymptomatic. Mask on. No cough, no respiratory distress.   HEENT:  [place] moist.  RESP: Regular respiratory rate.  CV: no edema.   ABD: soft, nontender..   MUSCULOSKELETAL: no palpable points of tenderness,   NEURO: non focal. Alert and oriented x3.   PSYCH: within normal limits. No depression or anxiety.  SKIN: warm dry intact   LYMPH: no cervical, supraclavicular  lymphadenopathy    Recent Labs: No results found for this or any previous visit (from the past 168 hour(s)).    Imaging: Imaging results 30 days: Ct Chest Without Contrast    Result Date: 7/6/2020  EXAM: CT CHEST WO CONTRAST LOCATION: [facility] DATE/TIME: 7/6/2020 10:20 AM INDICATION: s/p rad tx for presumed malignancy DANA, monitor scan COMPARISON: CT of the chest without contrast 11/13/2019, 03/06/2019, 11/08/2018 TECHNIQUE: CT chest without IV contrast. Multiplanar reformats were obtained. Dose reduction techniques were used. CONTRAST: None. FINDINGS: LUNGS AND PLEURA: 2 metallic fiducial markers are present in the lingula and adjacent angular bands of opacity in the lingula as well as the anterior basal left lower lobe associated with architectural distortion and volume loss are unchanged consistent with focal parenchymal scarring. No new nodularity or soft tissue thickening. Unchanged 4 mm nodule along the medial left major fissure (series 4, image 172) which has typical features of an intrapulmonary lymph node. Symmetric lung hyperinflation. A few stable peripheral nodules are present in the right lower lobe the largest of which is 3 to 4 mm (series 4, image 197). No new or enlarging lung nodules. No progressive airspace opacities. Trachea and central airways are patent. Unchanged patchy bilateral pleural thickening/plaque a few of which are calcified, a marker of asbestos exposure. MEDIASTINUM: Cardiac chambers are not enlarged. No pericardial effusion. Three-vessel atheromatous coronary calcifications. Unchanged anterior mediastinal nodule near the pericardium at the level of the pulmonary valve measuring 9 x 14 mm (series 4, image 164). There are no enlarged mediastinal or hilar lymph nodes. The esophagus is decompressed. UPPER ABDOMEN: Several well-circumscribed low-attenuation lesions in the liver parenchyma are unchanged, incompletely characterized but consistent with benign cysts. There are  multiple peripherally calcified calculi layering within the gallbladder. No gallbladder wall thickening. Stable right adrenal adenoma. MUSCULOSKELETAL: Diffuse thoracic spine degenerative disc disease characterized by disc space narrowing and marginal osteophytes. No focal vertebral compression deformity. Slight degenerative anterolisthesis of C7 on T1. No focal bone lesions.     1.  Unchanged scarring in the lingula and anterior left lower lobe, sequela of previous localized radiation therapy. No findings to suggest new or recurrent thoracic malignancy. 2.  Emphysema. 3.  Bilateral pleural plaque with calcification, a marker of asbestos exposure.       I, Alesia Hammond MD personally performed the services described in this documentation, as scribed by Behzad Plummer in my presence, and it is both accurate and complete.    Signed by: Alesia Hammond MD, MPH

## 2021-06-09 NOTE — TELEPHONE ENCOUNTER
I called Karen in f/u of his appt in clinic yesterday.  He said his visit went very well.  He got good news with his CT and doesn't have to return for one year.  He feels well and he has no questions or concerns at this time.  He and Zenaida primarily stay home with the COVID-19 pandemic.  I invited calls if questions or concerns arise prior to his next appt.  Karen was appreciative of the call.

## 2021-06-09 NOTE — PROGRESS NOTES
Pt here by himself for routine f/u with Dr. Hammond, feeling well. Symbicort helpful at keeping COPD under control per pt. No new medical problems or concerns.

## 2021-06-09 NOTE — PROGRESS NOTES
Assessment:  1.  Left cheek skin lesion, possible early squamous cell cancer.  2.  Follow-up vitamin B12 deficiency.  3.  Hypertension controlled.    Plan: Check vitamin B12 level which is fasting and basic profile.  Refer to dermatology for consideration of probable biopsy and excision.  Then plan follow-up here within 6 months to get established with his new primary care.  He understands that I am retiring later this summer.    Subjective: 85-year-old male presenting for follow-up on the above.  He notes he developed a spot on the left cheek and it is slightly elevated and feels scaly and feels to him like the spot that he had that I had removed previously that was a squamous cell cancer.  He is fasting to get lab work done.  He is feeling okay otherwise.  He would like to get refill on his Symbicort that is for 3 months rather than just 1 month at a time.  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 3 Inhaler 1     losartan (COZAAR) 100 MG tablet TAKE 1 TABLET BY MOUTH EVERY DAY . 90 tablet 1     naproxen sodium (ALEVE) 220 MG tablet Take 220 mg by mouth every 12 (twelve) hours as needed for pain.       albuterol (PROAIR HFA) 90 mcg/actuation  inhaler Inhale 2 puffs every 4 (four) hours as needed for wheezing. 8.5 g 1     Current Facility-Administered Medications   Medication Dose Route Frequency Provider Last Rate Last Dose     cyanocobalamin injection 1,000 mcg  1,000 mcg Intramuscular Once Nico Seymour CNP         Objective:/70   Pulse 65   Resp 22   Wt 183 lb (83 kg)   SpO2 97%   BMI 27.02 kg/m    HEENT is negative except that in the middle of the left cheek there is an elevated slightly irregular papule with scaling on the surface of it as probably not more than 4 mm diameter but does have some thickness to it.  Rest of examination is unchanged.

## 2021-06-11 NOTE — PROGRESS NOTES
Assessment:  1.  Hypertension controlled.  2.  Vitamin B12 deficiency, treated with shots.  3.  Left arm abrasion, needs TD booster.  4.  Primary lumbar osteoarthritis, stable.  5.  Status post ileal conduit.    Plan: Check basic profile, give Td booster as well as B12 shot.  Follow-up in 6 months.  Renew medications as needed and did the blood pressure medicines today.  Continue diet and exercise efforts etc.    Subjective: 82-year-old male presenting for follow-up of the above and notes that 8 days ago he did have an arm injury.  Regarding hypertension no headaches dizziness or leg swelling.  Regarding the low back he feels that has been stable and about the same.  He does need his B12 shot today.  He did have the injury to the left arm that was partly an abrasion.  He notes that he has been keeping it open to the area and it is seeming to heal but wanted me to check it.  He notes his ileal conduit is functioning okay.  History reviewed. No pertinent past medical history.  Current Outpatient Prescriptions   Medication Sig Dispense Refill     amLODIPine (NORVASC) 10 MG tablet TAKE 1 TABLET BY MOUTH EVERY DAY . 90 tablet 1     atenolol (TENORMIN) 50 MG tablet TAKE 1 TABLET (50 MG TOTAL) BY MOUTH DAILY. 90 tablet 1     cyanocobalamin 1,000 mcg/mL injection Inject 1,000 mcg into the shoulder, thigh, or buttocks once.       ipratropium-albuterol (COMBIVENT RESPIMAT)  mcg/actuation Aero inhaler Inhale 1 puff 4 (four) times a day. Max of 6 inhalations per 24 hours       losartan (COZAAR) 100 MG tablet TAKE 1 TABLET BY MOUTH EVERY DAY . 90 tablet 1     Current Facility-Administered Medications   Medication Dose Route Frequency Provider Last Rate Last Dose     cyanocobalamin injection 1,000 mcg  1,000 mcg Intramuscular Q30 Days Raffi Guevara MD   1,000 mcg at 05/16/17 0913     No Known Allergies  All other review of systems currently negative.    Objective:/60  Pulse 60  Wt 187 lb 6.4 oz (85 kg)  BMI  26.89 kg/m2  HEENT examination shows no acute change.  Neck supple without adenopathy or thyromegaly.  Lungs clear.  Heart regular rate and rhythm without murmur.  Abdomen shows no masses tenderness or hepatosplenomegaly.  No pedal edema.  The left arm dorsum has a healing abrasion.  No sign of infection.

## 2021-06-11 NOTE — PROGRESS NOTES
ASSESSMENT/PLAN:       1. Benign Essential Hypertension  The patient will continue on his current medication which is amlodipine 10 mg daily and losartan 100 mg daily    2. B12 deficiency     - cyanocobalamin 1000 MCG tablet; Take 1 tablet (1,000 mcg total) by mouth daily.  Dispense: 100 tablet; Refill: 3    3. Chronic obstructive pulmonary disease, unspecified COPD type (H)  The patient will continue on Symbicort 160-4.52 puffs twice a day and albuterol as a rescue inhaler    4. 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 S/P ileal conduit (H)  The patient has done well over the last 8 years with the treatment for his bladder cancer    We will plan to see the patient back in 6 months for an annual wellness visit      Doe Dyer MD      PROGRESS NOTE   9/24/2020    SUBJECTIVE:  Karen Santiago is a 85 y.o. male  who presents for   Chief Complaint   Patient presents with     Medication Management     refills BP med     The patient is seen today to establish care.  Has been a patient of Dr. Johnsons for a number of years.    1. Benign Essential Hypertension  The patient is on amlodipine 10 mg daily as well as losartan 100 mg daily.  He does not have significant problems with peripheral edema constipation and has not had any trouble with cough or rash.  Blood pressure seems to be well controlled.    2. B12 deficiency  Recently the patient transition from monthly injections of vitamin B12 to oral vitamin B12 and a follow-up B12 level 2 months ago was in the normal range.    3. Chronic obstructive pulmonary disease, unspecified COPD type (H)  The patient has COPD and it is treated with Symbicort 160- 4.52 puffs twice a day for his controller medicine and albuterol inhaler as needed for rescue medicine.  His COPD has been fairly well controlled.  He exercises in the summer months walking outside or on a track and  does have a treadmill that can be used in the winter.  He tries to exercise at least 3-4 times per week.    4. 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  Is been treated with external beam radiation therapy and it seems that the tumor has not returned or metastasized.  Follow-up scans have been negative.    5. S/P ileal conduit (H)  The patient has a history of bladder cancer that needed to be treated with a  Cystoprostatectomy with a ileal conduit in .    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       albuterol (PROAIR HFA) 90 mcg/actuation inhaler Inhale 2 puffs every 4 (four) hours as needed for wheezing. 8.5 g 1     amLODIPine (NORVASC) 10 MG tablet TAKE ONE TABLET BY MOUTH ONE TIME DAILY 90 tablet 1     budesonide-formoteroL (SYMBICORT) 160-4.5 mcg/actuation inhaler Inhale 2 puffs 2 (two) times a day. 3 Inhaler 1     cyanocobalamin 1000 MCG tablet Take 1 tablet (1,000 mcg total) by mouth daily. 100 tablet 3     losartan (COZAAR) 100 MG tablet TAKE 1 TABLET BY MOUTH EVERY DAY . 90 tablet 1     No current facility-administered medications for this visit.        Social History     Tobacco Use   Smoking Status Former Smoker     Types: Cigarettes     Last attempt to quit: 1980     Years since quittin.7   Smokeless Tobacco Never Used            OBJECTIVE:        No results found for this or any previous visit (from the past 240 hour(s)).    Vitals:    09/22/20 1453   BP: 124/60   Pulse: 69   Resp: 16   SpO2: 96%   Weight: 183 lb 3 oz (83.1 kg)     Weight: 183 lb 3 oz (83.1 kg)          Physical Exam:  GENERAL APPEARANCE: Very pleasant 85-year-old male, NAD, well hydrated, well nourished  SKIN:  Normal skin turgor, no lesions/rashes   HEENT: moist mucous membranes, no rhinorrhea  NECK: Normal without adenopathy or masses, no carotid bruits  CV: RRR, no M/G/R   LUNGS: Symmetrically decreased breath sounds without rales rhonchi or wheezes  ABDOMEN: S&NT, no masses or enlarged organs   EXTREMITY: no edema and full ROM of all joints  NEURO: no focal findings

## 2021-06-14 NOTE — PROGRESS NOTES
Assessment:  1.  Hypertension, controlled.  2.  Vitamin B12 deficiency, managed on injections.  3.  Mild chronic obstructive pulmonary disease.  4.  Status post ileal conduit.    Plan: Check basic profile and vitamin B12 level.  Renewed prescriptions for amlodipine, atenolol, and losartan.  At minimum follow-up in 6 months but earlier as needed.  He will ask [facility] to send copy of their records from last summer.  Continue regular exercise and healthy diet.  Return earlier as needed.  Combivent can be refilled as needed.    Subjective: 83-year-old male presenting for follow-up on the above.  Regarding hypertension no headaches dizziness or leg swelling.  Regarding breathing he was diagnosed by [facility] over 4 years ago with mild COPD based on breathing test.  He does use the Combivent inhaler once or twice a day but has not needed to use it 4 times a day.  He stopped smoking in 1980.  He has a history of the ileal conduit.  That has been working fine.  History reviewed. No pertinent past medical history.  No Known Allergies  Current Outpatient Prescriptions   Medication Sig Dispense Refill     amLODIPine (NORVASC) 10 MG tablet TAKE 1 TABLET BY MOUTH EVERY DAY . 90 tablet 1     atenolol (TENORMIN) 50 MG tablet TAKE 1 TABLET (50 MG TOTAL) BY MOUTH DAILY. 90 tablet 1     cyanocobalamin 1,000 mcg/mL injection Inject 1,000 mcg into the shoulder, thigh, or buttocks once.       ipratropium-albuterol (COMBIVENT RESPIMAT)  mcg/actuation Aero inhaler Inhale 1 puff 4 (four) times a day. Max of 6 inhalations per 24 hours       losartan (COZAAR) 100 MG tablet TAKE 1 TABLET BY MOUTH EVERY DAY . 90 tablet 1     No current facility-administered medications for this visit.      All other review of systems currently negative.    Objective:/78  Pulse 60  Wt 185 lb 6.4 oz (84.1 kg)  BMI 26.6 kg/m2  HEENT shows no acute change.  Neck supple without adenopathy or thyromegaly.  Lungs clear.  Heart regular rate and  rhythm without murmur.  Abdomen shows no masses tenderness or hepatosplenomegaly.  No pedal edema.  Is alert with clear speech.  He is fasting this morning.  Given B12 injection.

## 2021-06-16 ENCOUNTER — RECORDS - HEALTHEAST (OUTPATIENT)
Dept: LAB | Facility: CLINIC | Age: 86
End: 2021-06-16

## 2021-06-16 ENCOUNTER — RECORDS - HEALTHEAST (OUTPATIENT)
Dept: ADMINISTRATIVE | Facility: OTHER | Age: 86
End: 2021-06-16

## 2021-06-16 DIAGNOSIS — N28.89 MASS OF URETER: ICD-10-CM

## 2021-06-16 PROBLEM — J44.9 COPD (CHRONIC OBSTRUCTIVE PULMONARY DISEASE) (H): Status: ACTIVE | Noted: 2017-12-20

## 2021-06-16 PROBLEM — C34.12 MALIGNANT NEOPLASM OF UPPER LOBE OF LEFT LUNG (H): Status: ACTIVE | Noted: 2018-11-12

## 2021-06-16 NOTE — TELEPHONE ENCOUNTER
Spoke with patient.     Symbicort needs to be brand name for insurance to cover this. Generic is more expensive. Please send new Rx for when he needs his next refill.     Order pending as MIKE.

## 2021-06-16 NOTE — PROGRESS NOTES
ASSESSMENT/PLAN:       1. Pulmonary emphysema, unspecified emphysema type (H)    - HM2(CBC w/o Differential), hemoglobin 12.5 with last hemoglobin in 2018 on 2 occasions that year 1 was 12.0 and the other was 13  Etiology of the anemia is a bit uncertain.  No significant change over the last 3 years.    - budesonide-formoteroL (SYMBICORT) 160-4.5 mcg/actuation inhaler; Inhale 2 puffs 2 (two) times a day.  Dispense: 3 Inhaler; Refill: 3  Albuterol is used for his rescue inhaler    2. Benign Essential Hypertension    - Comprehensive Metabolic Panel, sodium 146 otherwise normal kidney and liver function and no signs of diabetes    - amLODIPine (NORVASC) 10 MG tablet; TAKE ONE TABLET BY MOUTH ONE TIME DAILY  Dispense: 90 tablet; Refill: 3  - losartan (COZAAR) 100 MG tablet; TAKE 1 TABLET BY MOUTH EVERY DAY .  Dispense: 90 tablet; Refill: 3    3. B12 deficiency    - Vitamin B12, 983 on oral replacement and will continue with that    - HM2(CBC w/o Differential)    4. 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    5. S/P ileal conduit (H)  Follow-up yearly with [facility] urology    6. Anemia, unspecified type  We will continue to follow his hemoglobin  Will consider checking some iron studies next time    Test results by my chart  Follow-up visit 6 months annual wellness visit  Level of medical decision making moderate  Number of conditions reviewed include 2 or more chronic stable conditions  Amount of data reviewed includes 3 or more unique tests that were ordered reviewed and interpreted  Risk of complications moderate requiring prescription drug management          Doe Dyer MD      PROGRESS NOTE   3/23/2021    SUBJECTIVE:  Karen Santiago is a 86 y.o. male  who presents for   Chief Complaint   Patient presents with     Medication  Refill     med check        1. Pulmonary emphysema, unspecified emphysema type (H)  The patient has a history of COPD with emphysema that was diagnosed at [facility] at the time of one of his urinary tract surgeries.  The patient quit smoking in 1980  With his work he had a lot of exposure to paper and cardboard dust  He is using Symbicort 160-4.5 mcg 2 puffs twice a day which seems to work very nicely for him.  He rarely has to use his rescue inhaler.  He does have a cough in the morning with a large amount of clear sputum that has produced  He occasionally has exacerbations that are treated with an oral antibiotic and prednisone    2. Benign Essential Hypertension  The patient's blood pressure seems to be adequately controlled with amlodipine 10 mg daily  Losartan 100 mg daily    3. B12 deficiency  For years the patient has been treated with monthly shots of vitamin B12 now is taking it orally which seems to be working adequately.  He prefers not having to come in monthly for B12 shot    4. 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  He seemed to have a very positive response to this treatment with no signs of recurrence.  He is having yearly checkups per radiation oncology with Dr. Hammond and that will be again in July 2021.  Usually has a scan at that time as well.  He has had no worsening of his shortness of breath or cough.  Weight has been stable.  He continues to be able to do his household chores including taking care of the snow and yard.    5. S/P ileal conduit (H)  In 2002 the patient had his initial bladder cancer surgery with recurrence noted in 2012.  This was cared for at [facility] in [place].  He continues to have follow-ups on a yearly basis with scans and has not had any recurrence  The ileal conduit has worked well    6. Anemia, unspecified  type  Over the last 2 to 3 years have noted that the patient has mild anemia with a hemoglobin between 12 and 13.  Normochromic normocytic anemia.    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 8.5 g 1     amLODIPine (NORVASC) 10 MG tablet TAKE ONE TABLET BY MOUTH ONE TIME DAILY 90 tablet 3     budesonide-formoteroL (SYMBICORT) 160-4.5 mcg/actuation inhaler Inhale 2 puffs 2 (two) times a day. 3 Inhaler 3     cyanocobalamin 1000 MCG tablet Take 1 tablet (1,000 mcg total) by mouth daily. 100 tablet 3     losartan (COZAAR) 100 MG tablet TAKE 1 TABLET BY MOUTH EVERY DAY . 90 tablet 3     naproxen sodium (ALEVE) 220 MG tablet Take 220 mg by mouth every 12 (twelve) hours as needed for pain.       No current facility-administered medications for this visit.        Social History     Tobacco Use   Smoking Status Former Smoker     Types: Cigarettes     Quit date: 1980     Years since quittin.2   Smokeless Tobacco Never Used           OBJECTIVE:        Recent Results (from the past 240 hour(s))   Vitamin B12   Result Value Ref Range    Vitamin B-12 983 (H) 213 - 816 pg/mL   HM2(CBC w/o Differential)   Result Value Ref Range    WBC 4.9 4.0 - 11.0 thou/uL    RBC 4.18 (L) 4.40 - 6.20 mill/uL    Hemoglobin 12.5 (L) 14.0 - 18.0 g/dL    Hematocrit 39.1 (L) 40.0 - 54.0 %    MCV 94 80 - 100 fL    MCH 29.9 27.0 - 34.0 pg    MCHC 32.0 32.0 - 36.0 g/dL    RDW 14.2 11.0 - 14.5 %     "Platelets 185 140 - 440 thou/uL    MPV 8.5 7.0 - 10.0 fL   Comprehensive Metabolic Panel   Result Value Ref Range    Sodium 146 (H) 136 - 145 mmol/L    Potassium 4.5 3.5 - 5.0 mmol/L    Chloride 111 (H) 98 - 107 mmol/L    CO2 25 22 - 31 mmol/L    Anion Gap, Calculation 10 5 - 18 mmol/L    Glucose 96 70 - 125 mg/dL    BUN 28 8 - 28 mg/dL    Creatinine 0.85 0.70 - 1.30 mg/dL    GFR MDRD Af Amer >60 >60 mL/min/1.73m2    GFR MDRD Non Af Amer >60 >60 mL/min/1.73m2    Bilirubin, Total 0.4 0.0 - 1.0 mg/dL    Calcium 8.7 8.5 - 10.5 mg/dL    Protein, Total 6.1 6.0 - 8.0 g/dL    Albumin 3.6 3.5 - 5.0 g/dL    Alkaline Phosphatase 61 45 - 120 U/L    AST 20 0 - 40 U/L    ALT 12 0 - 45 U/L       Vitals:    03/22/21 0954   BP: 120/80   Pulse: 65   SpO2: 98%   Weight: 181 lb (82.1 kg)   Height: 5' 9\" (1.753 m)     Weight: 181 lb (82.1 kg)          Physical Exam:  GENERAL APPEARANCE: 86-year-old male very pleasant, NAD, well hydrated, well nourished  SKIN:  Normal skin turgor, no lesions/rashes   HEENT: moist mucous membranes, no rhinorrhea  NECK: Normal without adenopathy or masses  CV: RRR, no M/G/R   LUNGS: CTAB however does have symmetrically decreased breath sounds  ABDOMEN: S&NT, no masses or enlarged organs   EXTREMITY: no edema and full ROM of all joints  NEURO: no focal findings    "

## 2021-06-16 NOTE — PROGRESS NOTES
Karen,    It was nice to see you in the clinic yesterday.  I would like to review your test results.    The vitamin B12 level is very good at 983.  Thus it is fine for you to continue taking the oral vitamin B12 supplement daily.    The metabolic panel showed that your liver and kidney function is normal and your blood glucose level was negative for diabetes.  Mild elevation of the sodium and chloride is not of concern.    CBC shows that you are mildly anemic but stable.  When looking back over the last 7 years, you consistently have had  mild anemia.  We need to simply continue to monitor that but at this point no additional testing or intervention is needed.    I would like to see you back in the clinic in about 6 months for an annual wellness visit.    If you have other questions please do not hesitate to contact me through [employer].    Best regards,  Dr. Dyer

## 2021-06-18 NOTE — PROGRESS NOTES
Assessment:  1.  Hypertension, controlled.  2.  Pulmonary nodule, per [facility] very suspicious for neoplasm.  3.  B12 deficiency.  4.  Chronic obstructive pulmonary disease.  5.  Ileal conduit, for prior cystoprostatectomy, apparently for squamous cell cancer per [facility] records although in another part of the chart there is mention of transitional cell cancer.    Plan: Check basic profile.  Renewed amlodipine, atenolol, and losartan for the next 6 months.  He will have [facility] send copies of the information from the last year on his reports, labs, and especially the scan from April 2017 as well as the recent scan.  He will have them provide disc copy of the images so that we can have that inputted into our system.  Then put in referral for radiation oncology consult for consideration of the option of radiation therapy for this lingular nodule as recommended by the pulmonary consultant at [facility] to him.  He understands that he would want to wait to see the radiation oncologist until all of the information is available here.  He will go ahead with seeing the pulmonary specialist at [facility] this next Monday on the 18th for his pulmonary function tests.  Depending on those pulmonary functions, I explained it still may be of benefit for him to see a thoracic surgeon for consultation regarding the possibility of laparoscopic thoracotomy as a way of managing this.  Spent in excess of 25 minutes with least 50% in counseling. I did explain that without biopsy one cannot be sure of the etiology of the pulmonary nodule.    Subjective: 83-year-old male presenting for follow-up on hypertension, needing his B12 shot etc.  But then he mentions that he had just been at the [facility] and in May had a scan that found a 11 mm semisolid nodule in the lingula that by scan had only been 6 x 3 mm on April 11, 2017.  He notes that the pulmonary doctor there told him that it was likely to be a primary lung cancer and  discussed the options of surgery or radiation therapy and apparently for his situation favored the radiation therapy.  He is getting lung function studies done this next Monday at [facility].  He asks whether or not we have gotten any of these reports and I explained that we have not gotten any reports from [facility] in the last several years.  He wants to look into the possibility of radiation therapy here locally and asks that that is available.  He is generally feeling good.  He has no fever nausea vomiting, appetite is fine, he has had the ileal conduit now for about 17 years.  No past medical history on file.  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 1 each 0     amLODIPine (NORVASC) 10 MG tablet TAKE 1 TABLET BY MOUTH EVERY DAY . 90 tablet 1     atenolol (TENORMIN) 50 MG tablet TAKE 1 TABLET (50 MG TOTAL) BY MOUTH DAILY. 90 tablet 1     ipratropium-albuterol (COMBIVENT RESPIMAT)  mcg/actuation Aero inhaler Inhale 1 puff 4 (four) times a day. Max of 6  inhalations per 24 hours       losartan (COZAAR) 100 MG tablet TAKE 1 TABLET BY MOUTH EVERY DAY . 90 tablet 1     Current Facility-Administered Medications   Medication Dose Route Frequency Provider Last Rate Last Dose     cyanocobalamin injection 1,000 mcg  1,000 mcg Intramuscular Q30 Days Lorin Ward MD   1,000 mcg at 05/15/18 0923     No Known Allergies  All other review of systems are currently negative.    Objective:/68  Pulse 60  Wt 188 lb 1.6 oz (85.3 kg)  BMI 26.99 kg/m2  HEENT examination shows no acute change.  Neck supple without adenopathy or thyromegaly.  Lungs are clear to auscultation.  Heart regular rate and rhythm without murmur.  Abdomen shows no masses tenderness or hepatosplenomegaly.  He has the well functioning ileal conduit in the right lower quadrant.  No pedal edema.  Is alert with clear speech.  Gait is normal.

## 2021-06-19 NOTE — PROGRESS NOTES
Spiritual Care Note    Spiritual Assessment:  introduced Spiritual Care. Patient stated no needs.     Care Provided: Empathetic presence    Plan of Care:  available for spiritual care or emotional support as needed.        Pedro Cochranin,

## 2021-06-19 NOTE — PROGRESS NOTES
Pt ambulatory to radiation clinic for initial radiation consult. VSS, has some back pain with activity. RN spent 20 minutes with pt discussing RT, RT planning, and RT side effects. Pathway given and explained.. Further recommendations and orders per provider.

## 2021-06-19 NOTE — BRIEF OP NOTE
[facility] Radiology Brief Op Note    CT FIDUCIAL PLACEMENT LUNG  Procedure Note    Name:  Karen Santiago  PCP:  Raffi Guevara MD  Procedure Date: 7/16/2018       Post-Procedure Diagnosis:  1. Lung nodule         Findings:    fiducials placed with CT guidance    Additional Staff:      Estimated Blood Loss:   \none    Specimens:    none    Complications:    None      Dominick Anton     Date: 7/16/2018  Time: 8:45 AM

## 2021-06-19 NOTE — PROGRESS NOTES
I called the clinic today to address pt's order for a lung biopsy .    According to the pt it should be a fiducial placement in his lung.  Waiting for call back.

## 2021-06-19 NOTE — PROGRESS NOTES
RADIATION ONCOLOGY WEEKLY TREATMENT VISIT NOTE      Assessment / Impression       1. Lung nodule       No matching staging information was found for the patient.   Tolerating radiation therapy well.  All questions and concerns addressed.    Plan:   1. Continue radiation treatment as prescribed.  2. Follow up in 3 months for CT chest and routine office visit.  3. Activity modification encouraged while recovering from radiation therapy.     Radiation: Site: DANA  Stereotactic Radiosurgery: Yes  Today's Dose: 3000  Total Dose for Thoracic: 5000  Today's Fraction/Total Fraction Thoracic: 3/5    Subjective:   Radiation Treatment Summary    HISTORY: Karen Santiago was treated with SBRT for 11 mm lung nodule in posterior lingula presumed to be cancer.    The patient has a history of urothelial and bladder carcinoma since 2002. Recurrence in 2012 and he underwent a cystoprostatectomy with ileal conduit urinary diversion on 10/15/2012. No neoadjuvant chemotherapy. He had recurrence in the left distal ureter and underwent a left distal ureterectomy with the excision of the cuff and reanastomosis 1/21/2013. 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 He required a stent which was removed.     June 2014 patient was following up with pulmonology as there was some concern for anterior mediastinal lesion, initially thought to be a small benign anterior mediastinal cyst. Noted at that time were numerous small and tiny pulmonary nodules which had been stable.      CT chest on 5/4/2018 showing 11 mm semisolid nodule with 3 mm solid component in the lingula posteriorly which is increased from a 6 x 3 mm ground glass nodule seen previously on 4/11/2017. His other multiple tiny bilateral solid pulmonary nodules remain unchanged    SITE TREATED: DANA  TOTAL DOSE: 5000  NUMBER OF FRACTIONS: 5  DATES COMPLETED: 8/23/2018  CONCURRENT CHEMOTHERAPY: No  ADJUVANT  THERAPY:No    He tolerated the treatment without unexpected side effects.       The following portions of the patient's history were reviewed and updated as appropriate: allergies, current medications, past family history, past medical history, past social history, past surgical history and problem list.    Assessment                  Body Site: Thoracic Site: DANA  Stereotactic Radiosurgery: Yes  Today's Dose: 3000  Total Dose for Thoracic: 5000  Today's Fraction/Total Fraction Thoracic: 3/5  Voice Chances/Stridor/Larynx: 0: Normal  Pharynx and Esphogaus: 0: No change over baseline  Constipation: 0: None  Diarrhea W/O Colostomy: 0: None  Cough: 0: Absent  Dyspnea: 2: Dyspnea on exertion                                              Sexuality Alteration                 Emotional Alteration Copin: Effective  Comfort Alteration KPS: 90% Can perform normal activity, minor signs of disease  Fatigue (ONS scale) : 0: No Fatigue  Pain Location: denies   Nutrition Alteration Anorexia: 0: None  Nausea: 0: None  Vomitin: None  Dyspepsia and/or Heartburn: 0: None  Weight: 185.6 POUNDS  Pharynx and Esphogaus: 0: No change over baseline  Skin Alteration Skin Sensation: 0: No problem  AUA Assessment                                  Accompanied by       Objective:     Exam:     Vitals:    18 1419   BP: 135/63   Pulse: (!) 56   Temp: 97.8  F (36.6  C)   TempSrc: Oral   SpO2: 96%   Weight: 185 lb 9.6 oz (84.2 kg)       Wt Readings from Last 8 Encounters:   18 185 lb 9.6 oz (84.2 kg)   18 182 lb 1 oz (82.6 kg)   18 184 lb (83.5 kg)   18 187 lb 3.2 oz (84.9 kg)   18 188 lb 1.6 oz (85.3 kg)   17 185 lb 6.4 oz (84.1 kg)   17 187 lb 6.4 oz (85 kg)   16 183 lb (83 kg)       General: Alert and oriented, in no acute distress  Karen has no Erythema.    Treatment Summary to Date    [facility] chart and setup information reviewed    Alesia ROSADO MD personally performed  the services described in this documentation, as scribed by Behzad Plummer in my presence, and it is both accurate and complete.    Signed by: Alesia Hammond MD, MPH

## 2021-06-19 NOTE — PROGRESS NOTES
I met with Karen and his spouse, Jose Manuel, prior to Karen's tx today.  Karen said his first tx went well.  He was impressed by the machine as well as the speed in which tx can be delivered.  He said he is feeling good and hopes that will continue. We talked briefly about possible side effects, what to expect with the OTV and plan for f/u.  He denies a need for assistance/resources at this time.  I will continue to check in with him and I invited calls as well.

## 2021-06-19 NOTE — PROGRESS NOTES
[facility] Radiation Oncology Consult Note    Patient: Karen Santiago  MRN: [id number]  Date of Service: 07/03/2018    Assessment / Impression     1. Lung nodule  CT Fiducial Placement Lung    NM PET CT Skull to Mid Thigh     No matching staging information was found for the patient.  ECOG Peformance Status  ECOG Performance Status: 1  Distress Assessment Score: 2    Plan:   1) Will need complete staging with PET to r/o LN metastases.   2) WIth smoking history and COPD, we can presume this is lung cancer without adding risk of biopsy. Very unlikely to be met from prior bladder cancer.   3) Because of the ground glass appearance of lesion, however,  will need to place a fiducial for improved tracking during radiation delivery.   4) He has never required steroids for COPD exacerbation so will not use pro- phylactically.     Face to face time  60 minutes with > 75% spent on consultation, education and coordination of care.  Intent of Therapy: Curative     Side effects that may occur during or within weeks after Radiation Therapy      Fatigue and general weakness     Darkening, irritation, itchiness, redness, dryness and peeling of the skin of the chest    Loss of chest and armpit hair    Painful swallowing limiting solid and liquid intake and causing dehydration    Nausea, vomiting and decrease in appetite    Side effects that may occur months or years after Radiation Therapy       Development of another tumor or cancer    Lung inflammation or fibrosis causing cough, fever, and shortness of breath     Fracture of ribs    Permanent narrowing or obstruction of the esophagus    Fluid compressing the lung (pleural effusion)    Coronary artery blockage causing angina pain or a heart attack    Thickening, telangiectasias (development of spider like blood vessels in the skin) and ulceration of the skin of the chest    Poor healing after a trauma or surgery in the irradiated areas    Nerve damage resulting in loss of  strength or sensation    The risks, benefits and alternatives to radiation therapy were outlined with the patient. All questions were answered and a consent was signed.             Subjective:      HPI: Karen Santiago is a very pleasant 83 y.o. male with 11 mm lung nodule in posterior lingula presumed to be cancer.     The patient has a history of urothelial and bladder carcinoma since 2002. Recurrence in 2012 and he underwent a cystoprostatectomy with ileal conduit urinary diversion on 10/15/2012. No neoadjuvant chemotherapy. He had recurrence in the left distal ureter and underwent a left distal ureterectomy with the excision of the cuff and reanastomosis 1/21/2013. 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 He required a stent which was removed.    June 2014 patient was following up with pulmonology as there was some concern for anterior mediastinal lesion, initially thought to be a small benign anterior mediastinal cyst. Noted at that time were numerous small and tiny pulmonary nodules which had been stable.     Most recent CT chest on 5/4/2018 showing 11 mm semisolid nodule with 3 mm solid component in the lingula posteriorly which is increased from a 6 x 3 mm ground glass nodule seen previously on 4/11/2017. His other multiple tiny bilateral solid pulmonary nodules remain unchanged. Given growth in size, patient was referred to myself for further evaluation and treatment.     The patient, approximately, has a 20 year pack year history who quit in 1980. Has mild to moderate COPD managed well on Symbicort. Otherwise doing well with no complaints.     Prior Radiation: No  Concurrent Chemotherapy: No    Current Outpatient Prescriptions   Medication Sig Dispense Refill     naproxen sodium (ALEVE) 220 MG tablet Take 220 mg by mouth every 12 (twelve) hours as needed for pain.       albuterol (PROAIR HFA;PROVENTIL HFA;VENTOLIN HFA) 90  mcg/actuation inhaler Inhale 2 puffs every 6 (six) hours as needed for wheezing or shortness of breath. 1 each 0     amLODIPine (NORVASC) 10 MG tablet TAKE 1 TABLET BY MOUTH EVERY DAY . 90 tablet 1     atenolol (TENORMIN) 50 MG tablet TAKE 1 TABLET (50 MG TOTAL) BY MOUTH DAILY. 90 tablet 1     budesonide-formoterol (SYMBICORT) 160-4.5 mcg/actuation inhaler Inhale 2 puffs 2 (two) times a day. 1 Inhaler 3     losartan (COZAAR) 100 MG tablet TAKE 1 TABLET BY MOUTH EVERY DAY . 90 tablet 1     Current Facility-Administered Medications   Medication Dose Route Frequency Provider Last Rate Last Dose     cyanocobalamin injection 1,000 mcg  1,000 mcg Intramuscular Q30 Days Lorin Ward MD   1,000 mcg at 06/14/18 0950     Past Medical History:   Diagnosis Date     COPD (chronic obstructive pulmonary disease) (H)      Rheumatoid arthritis (H)      Past Surgical History:   Procedure Laterality Date     OR CYSTECTOMY,ILEAL CONDUIT/SIGMOID BLADDER      Description: Complete Bladder Cystectomy With Ureteroileal Conduit;  Proc Date: 01/01/2002;  Comments: cystoprostatectomy for squamous cell carcinoma per 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     OR REPAIR UMBILICAL IRLANDA,<4Y/O,REDUC      Description: Umbilical Hernia Repair;  Recorded: 04/26/2010;     OR TOTAL HIP ARTHROPLASTY      Description: Total Hip Replacement;  Proc Date: 10/17/2012;     Review of patient's allergies indicates no known allergies.  No family history on file.  Social History     Social History     Marital status:      Spouse name: N/A     Number of children: N/A     Years of education: N/A     Occupational History     Not on file.     Social History Main Topics     Smoking  status: Former Smoker     Types: Cigarettes     Quit date: 1/1/1980     Smokeless tobacco: Never Used     Alcohol use No      Comment: 2-3 beer per month     Drug use: No     Sexual activity: Not on file     Other Topics Concern     Not on file     Social History Narrative        Review of Systems:        General  General (WDL): All general elements are within defined limits  EENT  ENT (WDL): Exceptions to WDL  Hearing loss: Yes - Recent (Less than 3 months)  Hearing Aids: Yes - Recent (Less than 3 months)  Tinnitus: Yes - Recent (Less than 3 months)  Respiratory       Respiratory (WDL): Exceptions to WDL  Dyspnea: Yes - Chronic (Greater than 3 months)  Cardiovascular  Cardiovascular (WDL): All cardiovascular elements are within defined limits  Endocrine  Endocrine (WDL): All endocrine elements are within defined limits  Gastrointestinal  Gastrointestinal (WDL): All gastrointestinal elements are within defined limits  Musculoskeletal  Musculoskeletal (WDL): Exceptions to WDL  Range of Motion Limitation: Yes - Chronic (Greater than 3 months)  Joint pain: Yes - Chronic (Greater than 3 months)  Back Pain: Yes - Chronic (Greater than 3 months)  Muscle pain or stiffness: Yes - Chronic (Greater than 3 months)  Integumentary                  Neurological  Neurological (WDL): All neurological elements are within defined limits  Dominant Hand: Right  Psychological/Emotional   Psychological/Emotional (WDL): All psychological/emotional elements are within defined limits  Hematological/Lymphatic  Hematological/Lymphatic (WDL): All hematological/lymphatic elements are within defined limits  Dermatologic  Dermatologic (WDL): All dermatological elements are within defined limits  Genitourinary/Reproductive  Genitourinary/Reproductive (WDL): All genitourinary/reproductive elements are within defined limits  Reproductive     Pain              Currently in Pain: Yes  Pain Score (Initial OR Reassessment): 3  Pain Frequency:  Intermittent  Location: back  Pain Intervention(s): Medication (See MAR)  Response to Interventions: a little helpful   AUA Assessment                    Accompanied by         Objective:     Physical Exam    Vitals:    07/03/18 0932   BP: 141/64   Pulse: (!) 52   Temp: 98.3  F (36.8  C)   TempSrc: Oral   SpO2: 98%   Weight: 187 lb 3.2 oz (84.9 kg)       GENERAL: no acute distress. Cooperative in conversation.   HEENT: pupils are equal, round and reactive. Oromucosa moist.  RESP: lungs are clear bilaterally per auscultation. Regular respiratory rate. No wheezes or rhonchi.  CV: Regular, rate and rhythm.   ABD: soft, nontender.  MUSCULOSKELETAL: no palpable points of tenderness   NEURO: non focal. Alert and oriented x3.   PSYCH: within normal limits. No depression or anxiety.  SKIN: warm dry intact   LYMPH: no cervical, supraclavicular lymphadenopathy  EXTREMITIES: no edema      Recent Labs: No results found for this or any previous visit (from the past 168 hour(s)).    Imaging:   CT Chest [facility] 5/4/2018    IMPRESSION:  1. Enlarging semisolid nodule in the lingula is worrisome for a pulmonary  low-grade adenocarcinoma.   2. No evidence of metastatic disease in the chest.    Result Narrative   REVISED REPORT:    EXAM: CT scan of the Chest without IV contrast including 3D maximum intensity  projections/volume renderings on a non-independent workstation       FINDINGS:   11 mm semisolid nodule with a 3 mm solid component in the lingula posteriorly  (series 2, images 180-188) was a 6 x 3 mm groundglass nodule on 04/11/2017.  Multiple tiny bilateral solid pulmonary nodules are unchanged since 06/09/2014  and should be benign. Tiny calcified granulomas. Mild emphysematous changes  bilaterally.     Bilateral pleural plaques some which are partially calcified. Mild scarring in  the lower lungs greatest in the right lower lobe, but no evidence of  interstitial lung disease.     Scattered nodes in the chest are stable  without adenopathy. Small right anterior  mediastinal cyst (image 178) is unchanged since 06/09/2014. Coronary artery  calcification. Probable mild papillary muscle calcification in the left  ventricle.     Degenerative and hypertrophic changes in the spine. Degenerative changes both  shoulders. Benign bone island in the left posterior 4th rib.     This examination was performed in conjunction with a CT of the abdomen, which  will be reported separately.        Pathology:   No results found for this or any previous visit (from the past 8760 hour(s)).      I, Alesia Hammond MD personally performed the services described in this documentation, as scribed by Behzad Plummer in my presence, and it is both accurate and complete.    Signed by: Alesia Hammond MD, MPH

## 2021-06-20 NOTE — PROGRESS NOTES
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    SITE TREATED: Left Upper Lobe   TOTAL DOSE: 5000cGy  NUMBER OF FRACTIONS: 5  DATES COMPLETED: 2018  CONCURRENT CHEMOTHERAPY: No  ADJUVANT THERAPY:No    Karen tolerated the treatment without unexpected side effects.     PLAN: Discharge instructions were given and Karen knows to call if questions/issues arise. Karen will be seen in f/u in 3 months with a scan.       Signed by: Alesia Hammond MD

## 2021-06-20 NOTE — LETTER
Letter by Doe Dyer MD at      Author: Doe Dyer MD Service: -- Author Type: --    Filed:  Encounter Date: 9/22/2020 Status: (Other)         September 24, 2020     Patient: Karen Santiago   YOB: 1934   Date of Visit: 9/22/2020       To Whom it May Concern:    Karen Santiago was seen in my clinic on 9/22/2020.    If you have any questions or concerns, please don't hesitate to call.    Sincerely,         Electronically signed by Doe Dyer MD

## 2021-06-20 NOTE — PROGRESS NOTES
Patient here ambulatory accompanied by wife for final SBRT lung treatment.  Patient denies c/o at this time.  Written discharge instructions reviewed and given to patient and family.  Follow up with Dr. Hammond in about 3 months with a scan.  Patient verbalized understanding and left ambulatory.

## 2021-06-21 NOTE — PROGRESS NOTES
Patient here ambulatory accompanied by wife for follow up s/p SBRT for lung nodule.  Patient states he is doing well had some c/o fatigue right after the treatments but feels this has improved.  Shortness of breath with exertion only.  CT scan done last week here today for results.  Seen by Dr. Hammond.  Plan RTC for follow up as directed by physician.

## 2021-06-21 NOTE — PROGRESS NOTES
[facility] Radiation Oncology Follow Up Note    Patient: Karen Santiago  MRN: [id number]  Date of Service: 11/12/2018    Assessment / Impression     1. Lung nodule  CT Chest Without Contrast      Cancer Staging  No matching staging information was found for the patient.  ECOG Peformance Status  ECOG Performance Status: 1  Distress Assessment Score  Distress Assessment Score: No distress  Interventions  Distress Assessment Intervention: Provider Notified  Body site: Thorax    Plan:   CT chest showing decrease in size of nodule and no new disease. Follow up in 6 months for repeat CT chest and office visit.    Face to face time  25 minutes with > 75% spent on consultation, education and coordination of care.    Subjective:      HPI: Karen Santiago is a 84 y.o. male who was treated with SBRT for an 11 mm lung nodule in posterior lingula presumed to be cancer.     The patient has a history of urothelial and bladder carcinoma since 2002. Recurrence in 2012 and he underwent a cystoprostatectomy with ileal conduit urinary diversion on 10/15/2012. No neoadjuvant chemotherapy. He had recurrence in the left distal ureter and underwent a left distal ureterectomy with the excision of the cuff and reanastomosis 1/21/2013. 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 He required a stent which was removed.     June 2014 patient was following up with pulmonology as there was some concern for anterior mediastinal lesion, initially thought to be a small benign anterior mediastinal cyst. Noted at that time were numerous small and tiny pulmonary nodules which had been stable.      CT chest on 5/4/2018 showing 11 mm semisolid nodule with 3 mm solid component in the lingula posteriorly which is increased from a 6 x 3 mm ground glass nodule seen previously on 4/11/2017. His other multiple tiny bilateral solid pulmonary nodules remain unchanged     SITE TREATED:  DANA  TOTAL DOSE: 5000  NUMBER OF FRACTIONS: 5  DATES COMPLETED: 8/23/2018  CONCURRENT CHEMOTHERAPY: No  ADJUVANT THERAPY:No     He tolerated the treatment without unexpected side effects.     The patient presents today for routine follow up. Today he has no complaints and is doing well. Minor fatigue after radiation which resolved quickly. Denies any worsening SOB. CT chest showing treatment response, HANNY.    Current Outpatient Medications   Medication Sig Dispense Refill     amLODIPine (NORVASC) 10 MG tablet TAKE 1 TABLET BY MOUTH EVERY DAY . 90 tablet 1     atenolol (TENORMIN) 50 MG tablet TAKE 1 TABLET (50 MG TOTAL) BY MOUTH DAILY. 90 tablet 1     budesonide-formoterol (SYMBICORT) 160-4.5 mcg/actuation inhaler Inhale 2 puffs 2 (two) times a day. 1 Inhaler 3     losartan (COZAAR) 100 MG tablet TAKE 1 TABLET BY MOUTH EVERY DAY . 90 tablet 1     naproxen sodium (ALEVE) 220 MG tablet Take 220 mg by mouth every 12 (twelve) hours as needed for pain.       albuterol (PROAIR HFA;PROVENTIL HFA;VENTOLIN HFA) 90 mcg/actuation inhaler Inhale 2 puffs every 6 (six) hours as needed for wheezing or shortness of breath. 1 each 0     Current Facility-Administered Medications   Medication Dose Route Frequency Provider Last Rate Last Dose     cyanocobalamin injection 1,000 mcg  1,000 mcg Intramuscular Q30 Days Lorin Ward MD   1,000 mcg at 10/30/18 1252       The following portions of the patient's history were reviewed and updated as appropriate: allergies, current medications, past family history, past medical history, past social history, past surgical history and problem list.    Review of Systems    General  Constitutional (WDL): Exceptions to WDL  Hot flashes/Night Sweats: Mild symptoms, no intervention needed(occasionally)  EENT  Eye Disorder (WDL): All eye disorder elements are within defined limits  Ear Disorder (WDL): All ear disorder elements are within defined limits  Respiratory       Respiratory (WDL):  Exceptions to WDL  Dyspnea: Shortness of breath with moderate exertion  Cardiovascular  Cardiovascular (WDL): All cardiovascular elements are within defined limits  Endocrine     Gastrointestinal  Gastrointestinal (WDL): All gastrointestinal elements are within defined limits  Musculoskeletal  Musculoskeletal and Connetive Tissue Disorders (WDL): All Musculoskeletal and Connetive Tissue Disorder elements are within defined limits  Integumentary               Integumentary (WDL): All integumentary elements are within defined limits  Neurological  Neurosensory (WDL): All neurosensory elements are within defined limits  Psychological/Emotional      Hematological/Lymphatic  Lymph (WDL): All lymph disorder elements are within defined limits  Dermatologic     Genitourinary/Reproductive  Genitourinary (WDL): All genitourinary elements are within defined limits  Reproductive     Pain              Currently in Pain: Yes  Pain Score (Initial OR Reassessment): No/Denies Pain  Pain Frequency: Intermittent  Location: back  Pain Intervention(s): Medication (See MAR)  Response to Interventions: good  AUA Assessment                                  Accompanied by  Accompanied by: Family Member(wife)      Objective:     Physical Exam    Vitals:    11/12/18 0929   BP: 156/70   Pulse: (!) 51   Temp: 97.9  F (36.6  C)   TempSrc: Oral   SpO2: 97%   Weight: 186 lb 14.4 oz (84.8 kg)        GENERAL: No acute distress. Cooperative in conversation.   HEENT: pupils are equal, round and reactive. Oromucosa moist.  RESP: Breath sounds clear. No rhonchi or wheezing.  ABD: Soft, nontender.  MUSCULOSKELETAL: no palpable points of tenderness   NEURO: Non focal. Alert and oriented x3.   PSYCH: Within normal limits. No depression or anxiety.  SKIN: Warm dry intact.  EXTREMITIES: No edema.      Recent Labs: No results found for this or any previous visit (from the past 168 hour(s)).    Imaging: Imaging results 30 days: Ct Chest Without  Contrast    Result Date: 11/8/2018  CT CHEST WO CONTRAST 11/8/2018 11:37 AM INDICATION: Neoplasm: chest, lung, rx monitor or f/u. TECHNIQUE: Routine chest. Dose reduction techniques were used. IV CONTRAST: None. COMPARISON: PET/CT 7/6/2018. FINDINGS: LUNGS AND PLEURA: Fiducial markers in region of prior lingular nodule. Nodule has decreased in density, now vaguely visible (series 2, image 65). Few tiny stable pulmonary nodules. Mild basilar scarring. Stable minimal bilateral pleural thickening and calcified plaques. MEDIASTINUM: No adenopathy. Severe coronary calcifications. LIMITED UPPER ABDOMEN: Stable hepatic hypodensities. Cholelithiasis. Stable right adrenal adenoma. Renal cysts. MUSCULOSKELETAL: Bony demineralization. No focal lesion.     CONCLUSION: 1.  Decreased density and now vaguely visible left upper lobe lingular nodule. 2.  No evidence of new or metastatic disease. 3.  Asbestos related pleural disease.       I, Alesia Hammond MD personally performed the services described in this documentation, as scribed by Behzad Plummer in my presence, and it is both accurate and complete.    Signed by: Alesia Hammond MD, MPH

## 2021-06-21 NOTE — PROGRESS NOTES
"I met with Karen and his spouse, Jose Manuel, prior to his appt with Dr. Hammond today.  He tells me he is doing well \"for my age\" and he has no new concerns at this time.  I will continue to check in with him and I invited calls as well.  Karen stated he has my card at home.  "

## 2021-06-22 NOTE — PROGRESS NOTES
Assessment:  1.  Hypertension, controlled.  2.  Chronic obstructive pulmonary disease, stable.  3.  Pulmonary nodule, status post radiation therapy earlier this year, with improved chest CT in November.  4.  Status post ileal conduit.  5.  Primary lumbar osteoarthritis.  6.  B12 deficiency related to previous surgery, on replacement B12 injections for 15 some years..    Plan: Check CBC, basic and hepatic profiles.  Continue current medication.  Renewed amlodipine 10 mg-1 p.o. daily #90 refillable x1, losartan 100 mg-#90-1 p.o. daily refillable x1, and Symbicort inhaler 2 puffs twice daily-#3 refillable x1.  Follow-up in 6 months.  He sees Dr. Hammond in May and has repeat CT chest at that time.  He continues to use occasional Aleve or ibuprofen as needed for his back pain related to lumbar osteoarthritis.    Subjective: 84-year-old male presenting for follow-up on multiple issues as above.  He notes he has been stable without any new issues.  Regarding hypertension no headaches dizziness or leg swelling.  Regarding his emphysema, he uses the Symbicort inhaler and requests refill and feels his breathing has been stable.  He had the radiation therapy for the lung nodule earlier this year and the scan in November did look improved and the plan is for repeat in 6 months.  He has had the lower back pain related to arthritis and he does use the anti-inflammation medicine as needed for that.  He needs a B12 injection today as he gets that monthly.  He did have a level done at [facility] in May that was in normal range.  Past Medical History:   Diagnosis Date     COPD (chronic obstructive pulmonary disease) (H)      Rheumatoid arthritis (H)      No Known Allergies  Current Outpatient Medications   Medication Sig Dispense Refill     albuterol (PROAIR HFA;PROVENTIL HFA;VENTOLIN HFA) 90 mcg/actuation inhaler Inhale 2 puffs every 6 (six) hours as needed for wheezing or shortness of breath. 1 each 0     amLODIPine (NORVASC) 10  MG tablet TAKE 1 TABLET BY MOUTH EVERY DAY . 90 tablet 1     atenolol (TENORMIN) 50 MG tablet TAKE 1 TABLET (50 MG TOTAL) BY MOUTH DAILY. 90 tablet 1     budesonide-formoterol (SYMBICORT) 160-4.5 mcg/actuation inhaler Inhale 2 puffs 2 (two) times a day. 3 Inhaler 1     losartan (COZAAR) 100 MG tablet TAKE 1 TABLET BY MOUTH EVERY DAY . 90 tablet 1     naproxen sodium (ALEVE) 220 MG tablet Take 220 mg by mouth every 12 (twelve) hours as needed for pain.       Current Facility-Administered Medications   Medication Dose Route Frequency Provider Last Rate Last Dose     cyanocobalamin injection 1,000 mcg  1,000 mcg Intramuscular Q30 Days Lorin Ward MD   1,000 mcg at 11/27/18 0932     All other review of systems are negative for any new issues.    Objective:/70   Pulse (!) 58   Resp 24   Wt 185 lb (83.9 kg)   SpO2 98%   BMI 25.80 kg/m    HEENT examination shows no acute change.  The external ears and TMs are normal.  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  Neck supple without adenopathy or thyromegaly.  Lungs are clear to auscultation.  No wheezes rales or rhonchi.  Heart regular rate and rhythm without murmur.  Abdomen shows no masses tenderness or hepatosplenomegaly.  No pedal edema.  He is alert with clear speech.

## 2021-06-22 NOTE — TELEPHONE ENCOUNTER
I checked with Pharmacy to verify.  The patient needs to take his prescription bottle into the pharmacy and if he had any pills from the lot that required replacement they do have the pills that are safe to take and will replace them for him.  We do not have to change from the losartan prescription.  I did contact the patient and he will proceed to taking his pill bottle to the pharmacy.

## 2021-06-22 NOTE — TELEPHONE ENCOUNTER
Medication Question or Clarification  Who is calling: Patient  What medication are you calling about?: Losartan  What dose do you take?:  100 mg  How often are you taking the medication?: daily  Who prescribed the medication?:  Dr. Guevara  What is your question/concern?:  Patient was called by pharmacy and the Losartan is to be stopped and an alternative is needed  Pharmacy: [id number] - [place], [place] - [address]   Okay to leave a detailed message?: Yes   Site CMT - Please call the pharmacy to obtain any additional needed information.

## 2021-06-23 ENCOUNTER — HOSPITAL ENCOUNTER (EMERGENCY)
Dept: EMERGENCY MEDICINE | Facility: CLINIC | Age: 86
Discharge: HOME | End: 2021-06-23
Attending: EMERGENCY MEDICINE
Payer: COMMERCIAL

## 2021-06-23 DIAGNOSIS — R10.84 GENERALIZED ABDOMINAL PAIN: ICD-10-CM

## 2021-06-23 LAB
ALBUMIN SERPL-MCNC: 3.9 G/DL (ref 3.5–5)
ALBUMIN UR-MCNC: ABNORMAL G/DL
ALP SERPL-CCNC: 64 U/L (ref 45–120)
ALT SERPL W P-5'-P-CCNC: 20 U/L (ref 0–45)
ANION GAP SERPL CALCULATED.3IONS-SCNC: 6 MMOL/L (ref 5–18)
APPEARANCE UR: CLEAR
AST SERPL W P-5'-P-CCNC: 21 U/L (ref 0–40)
BACTERIA #/AREA URNS HPF: ABNORMAL /[HPF]
BASOPHILS # BLD AUTO: 0 THOU/UL (ref 0–0.2)
BASOPHILS NFR BLD AUTO: 1 % (ref 0–2)
BILIRUB SERPL-MCNC: 0.5 MG/DL (ref 0–1)
BILIRUB UR QL STRIP: NEGATIVE
BUN SERPL-MCNC: 26 MG/DL (ref 8–28)
CALCIUM SERPL-MCNC: 9 MG/DL (ref 8.5–10.5)
CHLORIDE BLD-SCNC: 111 MMOL/L (ref 98–107)
CO2 SERPL-SCNC: 26 MMOL/L (ref 22–31)
COLOR UR AUTO: ABNORMAL
CREAT SERPL-MCNC: 0.94 MG/DL (ref 0.7–1.3)
EOSINOPHIL # BLD AUTO: 0.2 THOU/UL (ref 0–0.4)
EOSINOPHIL NFR BLD AUTO: 4 % (ref 0–6)
ERYTHROCYTE [DISTWIDTH] IN BLOOD BY AUTOMATED COUNT: 15.5 % (ref 11–14.5)
GFR SERPL CREATININE-BSD FRML MDRD: >60 ML/MIN/1.73M2
GLUCOSE BLD-MCNC: 106 MG/DL (ref 70–125)
GLUCOSE UR STRIP-MCNC: NEGATIVE MG/DL
HCT VFR BLD AUTO: 36.7 % (ref 40–54)
HGB BLD-MCNC: 11.4 G/DL (ref 14–18)
HGB UR QL STRIP: ABNORMAL
IMM GRANULOCYTES # BLD: 0 THOU/UL
IMM GRANULOCYTES NFR BLD: 0 %
KETONES UR STRIP-MCNC: NEGATIVE MG/DL
LEUKOCYTE ESTERASE UR QL STRIP: ABNORMAL
LIPASE SERPL-CCNC: 38 U/L (ref 0–52)
LYMPHOCYTES # BLD AUTO: 1.3 THOU/UL (ref 0.8–4.4)
LYMPHOCYTES NFR BLD AUTO: 28 % (ref 20–40)
MCH RBC QN AUTO: 28.1 PG (ref 27–34)
MCHC RBC AUTO-ENTMCNC: 31.1 G/DL (ref 32–36)
MCV RBC AUTO: 90 FL (ref 80–100)
MONOCYTES # BLD AUTO: 0.4 THOU/UL (ref 0–0.9)
MONOCYTES NFR BLD AUTO: 9 % (ref 2–10)
MUCOUS THREADS #/AREA URNS LPF: PRESENT LPF
NEUTROPHILS # BLD AUTO: 2.7 THOU/UL (ref 2–7.7)
NEUTROPHILS NFR BLD AUTO: 59 % (ref 50–70)
NITRATE UR QL: POSITIVE
PH UR STRIP: 5.5 [PH] (ref 5–8)
PLATELET # BLD AUTO: 212 THOU/UL (ref 140–440)
PMV BLD AUTO: 8.7 FL (ref 8.5–12.5)
POTASSIUM BLD-SCNC: 4.3 MMOL/L (ref 3.5–5)
PROT SERPL-MCNC: 6.5 G/DL (ref 6–8)
RBC # BLD AUTO: 4.06 MILL/UL (ref 4.4–6.2)
RBC URINE: 116 HPF
SODIUM SERPL-SCNC: 143 MMOL/L (ref 136–145)
SP GR UR STRIP: >1.05 (ref 1–1.03)
SQUAMOUS EPITHELIAL: 1 /HPF
TROPONIN I SERPL-MCNC: <0.01 NG/ML (ref 0–0.29)
UROBILINOGEN UR STRIP-ACNC: ABNORMAL
WBC URINE: 44 HPF
WBC: 4.7 THOU/UL (ref 4–11)

## 2021-06-23 ASSESSMENT — MIFFLIN-ST. JEOR: SCORE: 1473.24

## 2021-06-23 NOTE — PROGRESS NOTES
"Assessment:  1.  COPD exacerbation.    Plan: Prescribed prednisone 20 mg-#15-1 p.o. twice daily times 5 days then 1 p.o. daily for 5 days and then stop.  Prescribed azithromycin 250 mg-#6-take 2 p.o. today then 1 p.o. daily on days 2 through 5.  Renewed albuterol inhaler-and advised to use 2 puffs every 4 hours as needed for either cough or wheezing or shortness of breath.  If not clearing well then follow-up.  If any worsening call immediately.  Otherwise follow-up in June for next med check if doing well.  He understands and agrees.    Subjective: 84-year-old male presenting for evaluation of illness over the last week which is primarily coughing and bringing up phlegm and congestion.  He has not had any high fever.  No nausea or vomiting.  He does feel congested in his lungs.  He is taking the Symbicort regular twice a day but he is not use the albuterol inhaler at all.  When he coughs it is mostly clear phlegm that he brings up but he did not have a cough at all prior to 1 week ago.  Patient Active Problem List   Diagnosis     Primary Osteoarthritis Of The Lumbar Vertebrae     Obesity     B12 deficiency     Benign Essential Hypertension     Squamous Cell Carcinoma Of The Skin     S/P ileal conduit (H)     COPD (chronic obstructive pulmonary disease) (H)     Lung nodule     Malignant neoplasm of bladder (H)     Malignant neoplasm of ureter (H)     No Known Allergies  Current medication includes amlodipine 10 mg/day, atenolol 50 mg, losartan 100 mg, Symbicort 2 puffs twice daily, and he has the albuterol inhaler but has not used it, and he does use the naproxen to 20 mg at times.  All other review of systems are negative.    Objective:/62   Pulse (!) 54   Temp 97.8  F (36.6  C)   Ht 5' 11\" (1.803 m)   Wt 181 lb (82.1 kg)   SpO2 94%   BMI 25.24 kg/m    HEENT shows no acute change except for minimal nasal congestion.  Neck supple without adenopathy or thyromegaly.  Lungs show both inspiratory and " expiratory wheezes both posterior and in front chest.  No respiratory distress.  Heart regular rate and rhythm without murmur.  Abdomen shows no masses tenderness or hepatosplenomegaly.  No pedal edema.  He is alert with clear speech.

## 2021-06-24 NOTE — PROGRESS NOTES
Assessment:  1.  Cough, likely multifactorial.  By exam, bronchospasm is improved.  2.  Chronic obstructive pulmonary disease, chronic bronchitis.  3.  Left lung nodule, status post radiation therapy August 2018    Plan: Do go ahead and stop atenolol today.  Prednisone 20 mg-#5-1 p.o. daily for 5 days then discontinue here.  If any changes or worsening he will call return earlier.  Otherwise follow-up with me in 3-4 weeks.  I explained that atenolol as a beta-blocker can cause or contribute to bronchospasm and he is tolerated reducing the dose from 50-25 mg a day and it is likely he will tolerate having it be stopped.  But I also explained that that is probably not the primary cause of his recent difficulty.  The follow-up CT of the chest that had been scheduled for May by Dr. Hammond can be moved up to sometime in the next several weeks and then follow-up with her after that.  I do recommend he use the Symbicort twice daily on a regular basis since there is been times he was only using it once a day.    Subjective: 84-year-old male presenting for follow-up on cough.  See the multiple recent visits.  He did reduce the atenolol dose from 50 mg a day to 25 mg a day 2 weeks ago.  He has been taking the prednisone 40 mg a day and took his last dose of that this morning.  He notes that some days he can feel good and coughing very little and then other days he can cough a lot and have more sputum production.  No colored phlegm.  No fever.  But overall he does not feel like there is any significant change in the last 2 weeks.  He has a history of the chronic obstructive pulmonary disease.  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 Hammond's note of November 2018.  Past Medical History:   Diagnosis Date     COPD (chronic obstructive pulmonary disease) (H)      Rheumatoid arthritis (H)      No Known Allergies  Current  Outpatient Medications   Medication Sig Dispense Refill     albuterol (PROAIR HFA;PROVENTIL HFA;VENTOLIN HFA) 90 mcg/actuation inhaler Inhale 2 puffs every 4 (four) hours as needed for wheezing or shortness of breath. 1 each 1     amLODIPine (NORVASC) 10 MG tablet TAKE 1 TABLET BY MOUTH EVERY DAY . 90 tablet 1     budesonide-formoterol (SYMBICORT) 160-4.5 mcg/actuation inhaler Inhale 2 puffs 2 (two) times a day. 3 Inhaler 1     losartan (COZAAR) 100 MG tablet TAKE 1 TABLET BY MOUTH EVERY DAY . 90 tablet 1     naproxen sodium (ALEVE) 220 MG tablet Take 220 mg by mouth every 12 (twelve) hours as needed for pain.       predniSONE (DELTASONE) 20 MG tablet Take 20 mg by mouth daily. 5 tablet 0     Current Facility-Administered Medications   Medication Dose Route Frequency Provider Last Rate Last Dose     [START ON 3/19/2019] cyanocobalamin injection 1,000 mcg  1,000 mcg Intramuscular Q30 Days Raffi Guevara MD         Note the above list of medication is after he was taken off the atenolol 25 mg a day today.  Also the addition of the prednisone 20 mg/day that was done today.    Objective:/74   Pulse 62   Temp 97.8  F (36.6  C) (Oral)   Resp 20   Wt 187 lb (84.8 kg)   SpO2 95%   BMI 26.08 kg/m    HEENT shows no acute change.  Neck supple without adenopathy or thyromegaly.  Lungs have scattered rhonchi with more on the left side than the right side, more posteriorly than anteriorly, but I do not hear any wheezes today.  Heart regular rate and rhythm.  Abdomen shows no masses tenderness or paraspinal megaly.  No pedal edema.  He is alert with clear speech.

## 2021-06-24 NOTE — TELEPHONE ENCOUNTER
I called him to check on his status.  He notes he is doing much better and coughing is substantially reduced and he feels better.  He states he is sleeping okay.  I did explain that it is possible for beta-blocker such as atenolol to aggravate or trigger wheezing.  As far as he recalls he is only been on that for the high blood pressure.  He is not recalling any trouble with fast heart rate difficulty.  So I do also recommend that we reduce the dose of atenolol from 50 mg a day down to 25 mg a day and depending on how he is doing look at whether or not to end up stopping that in the future.  I also had discussed the case with Dr. Hammond, and it is certainly reasonable for us to move up his follow-up CAT scan for better comparison to before.  With how he is doing clinically now with his improvement, he and I mutually decided to see him back on February 28, and then pursue getting his follow-up CAT scan from there.  If he has trouble with sleeping, we can taper his prednisone faster.   Principal Discharge DX:	Bronchitis

## 2021-06-24 NOTE — PROGRESS NOTES
Assessment:  1.  COPD exacerbation.  2.  Cough due to bronchospasm.    Plan: Prescribed prednisone 20 mg-#30-2 p.o. daily.  Follow-up within 2 weeks.  Call return earlier if any worsening.  If he develops any fever or colored sputum he can let us know.  On follow-up then decide tapering regimen.  Await radiology interpretation of chest x-ray.  Also if not improving, then consider trying to reduce dose of atenolol.    Subjective: 84-year-old male presenting for follow-up regarding the cough and congestion and see the recent note.  He did take the prednisone taper over the 10 days.  He did take the antibiotic.  He has not had any fever or colored sputum.  But he notes he continues to have the cough and the whitish sputum production and notes it is intermittent but at times can be very bothersome.  His last CT of the chest was done in November 2018.  See Dr. Hammond's note of mid November.  He has a history of the lung nodule treated with radiation therapy  Past Medical History:   Diagnosis Date     COPD (chronic obstructive pulmonary disease) (H)      Rheumatoid arthritis (H)      Patient Active Problem List   Diagnosis     Primary Osteoarthritis Of The Lumbar Vertebrae     Obesity     B12 deficiency     Benign Essential Hypertension     Squamous Cell Carcinoma Of The Skin     S/P ileal conduit (H)     COPD (chronic obstructive pulmonary disease) (H)     Lung nodule     Malignant neoplasm of bladder (H)     Malignant neoplasm of ureter (H)       No Known Allergies  He does get help at times with the cough when he takes the albuterol inhaler.  All other review systems are negative.    Objective:/78   Pulse 61   Temp 98  F (36.7  C) (Oral)   Resp 22   Wt 184 lb (83.5 kg)   SpO2 92%   BMI 25.66 kg/m    HEENT exam shows no acute change.  Neck supple without adenopathy.  Lungs show diffuse expiratory wheezes.  Heart regular rate and rhythm.  Only.  No pedal edema.  Is alert with clear speech.  No respiratory  distress.

## 2021-06-25 NOTE — PROGRESS NOTES
Assessment:  1.  Cough has basically resolved.  2.  Chronic obstructive pulmonary disease.  3.  Hypertension controlled.  4.  Left lung nodule presumed adenocarcinoma status post radiation therapy.  5.  Recent CT chest per radiology more likely inflammation/infection in left lower lobe as well as postradiation therapy changes.    Plan: To stay off the atenolol.  Do stay on current medication.  He will schedule to see Dr. Hammond within the next month for her review with him of the CT scan etc. and determination of timeframe for next scan and follow-up etc.  He will call if he has any development of further symptoms or problems.  Otherwise follow-up to see me within 6 months for next regular med check.  He understands and agrees.  He will let me know if any difficulty getting scheduled to see Dr. Hammond.    Subjective: 84-year-old male presenting for follow-up.  See the notes over the last 3 months.  He notes that he is finally feeling back to his usual self.  He finished the prednisone.  He is off the atenolol.  He notes he uses his Symbicort inhaler on a regular basis.  No new symptoms.  No fever and currently no significant cough.  Past Medical History:   Diagnosis Date     COPD (chronic obstructive pulmonary disease) (H)      Rheumatoid arthritis (H)      No Known Allergies  Current Outpatient Medications   Medication Sig Dispense Refill     albuterol (PROAIR HFA;PROVENTIL HFA;VENTOLIN HFA) 90 mcg/actuation inhaler Inhale 2 puffs every 4 (four) hours as needed for wheezing or shortness of breath. 1 each 1     amLODIPine (NORVASC) 10 MG tablet TAKE 1 TABLET BY MOUTH EVERY DAY . 90 tablet 1     budesonide-formoterol (SYMBICORT) 160-4.5 mcg/actuation inhaler Inhale 2 puffs 2 (two) times a day. 3 Inhaler 1     losartan (COZAAR) 100 MG tablet TAKE 1 TABLET BY MOUTH EVERY DAY . 90 tablet 1     naproxen sodium (ALEVE) 220 MG tablet Take 220 mg by mouth every 12 (twelve) hours as needed for pain.       Current  Facility-Administered Medications   Medication Dose Route Frequency Provider Last Rate Last Dose     cyanocobalamin injection 1,000 mcg  1,000 mcg Intramuscular Q30 Days Raffi Guevara MD   1,000 mcg at 03/19/19 0903     He is happy to be feeling back to his usual self.    Objective:/60   Pulse 73   Resp 24   Wt 182 lb (82.6 kg)   SpO2 96%   BMI 25.38 kg/m    HEENT is negative.  Neck supple.  Lungs sound clear with no wheezes rales or rhonchi.  Heart regular rate and rhythm without murmur.  No pedal edema.    See report of the CT of the chest of March 6.

## 2021-06-25 NOTE — ED TRIAGE NOTES
"Pt presents to ED with c/o LLQ abdominal pain that started around 1400 this afternoon.  Pt reports good bowel movement this morning.  Since onset of pain, feels \"plugged up\".  Pt has a urostomy.  No hx of bowel obstructions or diverticulitis. Pt took a suppository at home with no relief.  Denies nausea or vomiting.  "

## 2021-06-26 NOTE — ED PROVIDER NOTES
EMERGENCY DEPARTMENT ENCOUNTER      NAME: Karen Santiago  AGE: 86 y.o. male  YOB: 1934  MRN: [id number]  EVALUATION DATE & TIME: 2021 10:31 PM    PCP: Doe Dyer MD    ED PROVIDER: Lidia Escobar M.D.      Chief Complaint   Patient presents with     Abdominal Pain         FINAL IMPRESSION:  1. Generalized abdominal pain          ED COURSE & MEDICAL DECISION MAKIN y.o. male presents to the Emergency Department for evaluation of abdominal pain.  No clear etiology found for his pain.  He could be having intermittent bowel obstruction, gas, or pain from stool.  Patient has a urostomy bag.  We will not treat with antibiotics until cultures have resulted and demonstrate a clear urinary tract infection.    11:05 PM I met with the patient wearing a surgical mask, obtained history, performed an initial exam, and discussed options and plan for diagnostics and treatment here in the ED. the patient was pain-free upon my evaluation.  He was mostly concerned about a bowel obstruction, which I was able to reassure him that was not present.    Pertinent Labs & Imaging studies reviewed. (See chart for details)    At the conclusion of the encounter I discussed the results of all of the tests and the disposition. The questions were answered. The patient or family acknowledged understanding and was agreeable with the care plan.         MEDICATIONS GIVEN IN THE EMERGENCY:  Medications   iopamidol solution 100 mL (ISOVUE-370) (100 mL Intravenous Given 21)       NEW PRESCRIPTIONS STARTED AT TODAY'S ER VISIT  Discharge Medication List as of 2021 11:27 PM      CONTINUE these medications which have NOT CHANGED    Details   albuterol (PROAIR HFA) 90 mcg/actuation inhaler Inhale 2 puffs every 4 (four) hours as needed for wheezing., Starting 2020, Normal      amLODIPine (NORVASC) 10 MG tablet TAKE ONE TABLET BY MOUTH ONE TIME DAILY, Normal      cyanocobalamin 1000 MCG tablet  Take 1 tablet (1,000 mcg total) by mouth daily., Starting Tue 9/22/2020, No Print      losartan (COZAAR) 100 MG tablet TAKE 1 TABLET BY MOUTH EVERY DAY ., Normal      naproxen sodium (ALEVE) 220 MG tablet Take 220 mg by mouth every 12 (twelve) hours as needed for pain., Until Discontinued, Historical Med      SYMBICORT 160-4.5 mcg/actuation inhaler Inhale 2 puffs 2 (two) times a day., Starting Tue 3/23/2021, Normal                =================================================================    HPI    Patient information was obtained from: The Patient     Use of Intrepreter: N/A     Karen Santiago is a 86 y.o. male with a medical history of HTN, COPD, bladder cancer, s/p ileal conduit who presents to the ED via walk in for evaluation of abdominal pain    Patient reports that he had a good bowel movement this morning, and then this afternoon at 1 PM he started having intermittent LLQ abdominal pain. The pain lasted a minute or two at a time, and he rates it 6/10 when it came on. He says currently his pain is improved.   Patient denies any vomiting, fevers, chills, problems peeing ( has urostomy) or any other complaints at this time. Patient has a urinary stent in place which they have plans to remove in august.     Social Hx: reviewed and nothing pertinent.     REVIEW OF SYSTEMS   Review of Systems   Constitutional: Negative for chills and fever.   Gastrointestinal: Positive for abdominal pain (resolved). Negative for diarrhea and vomiting.   Genitourinary: Negative.    All other systems reviewed and are negative.       PAST MEDICAL HISTORY:  Past Medical History:   Diagnosis Date     Cancer (H)     bladder     COPD (chronic obstructive pulmonary disease) (H)      Hypertension      Rheumatoid arthritis (H)        PAST SURGICAL HISTORY:  Past Surgical History:   Procedure Laterality Date     [place] CYSTECTOMY,ILEAL CONDUIT/SIGMOID BLADDER      Description: Complete Bladder Cystectomy With Ureteroileal Conduit;   Proc Date: 01/01/2002;  Comments: cystoprostatectomy for squamous cell carcinoma per [facility]     MD REMOVAL TESTIS,SIMPLE      Description: Orchiectomy Left;  Recorded: 04/26/2010;  Comments: for benign interstitial tumor     MD REMV PROSTATE,RETROPUBIC,SUBTOTAL      Description: Prostatectomy, Retropubic;  Recorded: 05/19/2009;  Comments: 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      Current Outpatient Medications on File Prior to Encounter   Medication Sig     albuterol (PROAIR HFA) 90 mcg/actuation inhaler Inhale 2 puffs every 4 (four) hours as needed for wheezing.     amLODIPine (NORVASC) 10 MG tablet TAKE ONE TABLET BY MOUTH ONE TIME DAILY     cyanocobalamin 1000 MCG tablet Take 1 tablet (1,000 mcg total) by mouth daily.     losartan (COZAAR) 100 MG tablet TAKE 1 TABLET BY MOUTH EVERY DAY .     naproxen sodium (ALEVE) 220 MG tablet Take 220 mg by mouth every 12 (twelve) hours as needed for pain.     SYMBICORT 160-4.5 mcg/actuation inhaler Inhale 2 puffs 2 (two) times a day.       ALLERGIES:  No Known Allergies    FAMILY HISTORY:  History reviewed. No pertinent family history.    SOCIAL HISTORY:   Social History     Socioeconomic History     Marital status:      Spouse name: None     Number of children: None     Years of education: None     Highest education level: None   Occupational History     None   Social Needs     Financial resource strain: None     Food insecurity     Worry: None     Inability: None     Transportation needs     Medical: None     Non-medical: None   Tobacco Use     Smoking status: Former Smoker     Types: Cigarettes     Quit date: 1/1/1980     Years since  "quittin.5     Smokeless tobacco: Never Used   Substance and Sexual Activity     Alcohol use: No     Comment: 2-3 beer per month     Drug use: No     Sexual activity: None   Lifestyle     Physical activity     Days per week: None     Minutes per session: None     Stress: None   Relationships     Social connections     Talks on phone: None     Gets together: None     Attends [employer] service: None     Active member of club or organization: None     Attends meetings of clubs or organizations: None     Relationship status: None     Intimate partner violence     Fear of current or ex partner: None     Emotionally abused: None     Physically abused: None     Forced sexual activity: None   Other Topics Concern     None   Social History Narrative     None       VITALS:  Patient Vitals for the past 24 hrs:   BP Temp Temp src Pulse Resp SpO2 Height Weight   21 2333 128/81 -- -- 67 18 -- -- --   21 1925 171/70 97.9  F (36.6  C) Oral 72 18 96 % 5' 11\" (1.803 m) 170 lb (77.1 kg)       PHYSICAL EXAM    Constitutional: Well developed, Well nourished, mild distress   HEENT: Normocephalic, Atraumatic, Bilateral external ears normal, Oropharynx normal, mucous membranes moist, Nose normal. Neck-  Normal range of motion, No tenderness, Supple, No stridor.   Eyes: PERRL, EOMI, Conjunctiva normal, No discharge.   Respiratory: Normal breath sounds, No respiratory distress, No wheezing, Speaks full sentences easily. No cough.  Cardiovascular: Normal heart rate, Regular rhythm,No murmurs, No rubs, No gallops. Chest wall nontender.  GI: No excessive obesity. Bowel sounds normal, Soft, No tenderness, No masses, No flank tenderness. No rebound or guarding.   Musculoskeletal: 2+ DP pulses. No edema.  Good range of motion in all major joints. No tenderness to palpation or major deformities noted.   Integument: Warm, Dry, No erythema, No rash. No petechiae.  Neurologic: Alert , Normal motor function,  No focal deficits noted. " Normal gait.  Psychiatric: Affect normal, Judgment normal, Mood normal. Cooperative.      LAB:  All pertinent labs reviewed and interpreted.  Results for orders placed or performed during the hospital encounter of 06/23/21   Lipase   Result Value Ref Range    Lipase 38 0 - 52 U/L   Troponin I   Result Value Ref Range    Troponin I <0.01 0.00 - 0.29 ng/mL   Comprehensive Metabolic Panel   Result Value Ref Range    Sodium 143 136 - 145 mmol/L    Potassium 4.3 3.5 - 5.0 mmol/L    Chloride 111 (H) 98 - 107 mmol/L    CO2 26 22 - 31 mmol/L    Anion Gap, Calculation 6 5 - 18 mmol/L    Glucose 106 70 - 125 mg/dL    BUN 26 8 - 28 mg/dL    Creatinine 0.94 0.70 - 1.30 mg/dL    GFR MDRD Af Amer >60 >60 mL/min/1.73m2    GFR MDRD Non Af Amer >60 >60 mL/min/1.73m2    Bilirubin, Total 0.5 0.0 - 1.0 mg/dL    Calcium 9.0 8.5 - 10.5 mg/dL    Protein, Total 6.5 6.0 - 8.0 g/dL    Albumin 3.9 3.5 - 5.0 g/dL    Alkaline Phosphatase 64 45 - 120 U/L    AST 21 0 - 40 U/L    ALT 20 0 - 45 U/L   HM1 (CBC with Diff)   Result Value Ref Range    WBC 4.7 4.0 - 11.0 thou/uL    RBC 4.06 (L) 4.40 - 6.20 mill/uL    Hemoglobin 11.4 (L) 14.0 - 18.0 g/dL    Hematocrit 36.7 (L) 40.0 - 54.0 %    MCV 90 80 - 100 fL    MCH 28.1 27.0 - 34.0 pg    MCHC 31.1 (L) 32.0 - 36.0 g/dL    RDW 15.5 (H) 11.0 - 14.5 %    Platelets 212 140 - 440 thou/uL    MPV 8.7 8.5 - 12.5 fL    Neutrophils % 59 50 - 70 %    Lymphocytes % 28 20 - 40 %    Monocytes % 9 2 - 10 %    Eosinophils % 4 0 - 6 %    Basophils % 1 0 - 2 %    Immature Granulocyte % 0 <=0 %    Neutrophils Absolute 2.7 2.0 - 7.7 thou/uL    Lymphocytes Absolute 1.3 0.8 - 4.4 thou/uL    Monocytes Absolute 0.4 0.0 - 0.9 thou/uL    Eosinophils Absolute 0.2 0.0 - 0.4 thou/uL    Basophils Absolute 0.0 0.0 - 0.2 thou/uL    Immature Granulocyte Absolute 0.0 <=0.0 thou/uL   Urinalysis-UC if Indicated   Result Value Ref Range    Color, UA Light Yellow Light Yellow, Yellow    Clarity, UA Clear Clear    Glucose, UA Negative  Negative    Protein, UA 70 mg/dL (!) Negative    Bilirubin, UA Negative Negative    Urobilinogen, UA <2.0 mg/dL <2.0 mg/dL    pH, UA 5.5 5.0 - 8.0    Blood, UA 0.2 mg/dL (!) Negative    Ketones, UA Negative Negative    Nitrite, UA Positive (!) Negative    Leukocytes,  Rand/uL (!) Negative    Specific Gravity, UA >1.050 (H) 1.001 - 1.030    RBC,  (H) <=2 hpf    WBC UA 44 (H) <=5 hpf    Bacteria, UA Few (!) None Seen    Squamous Epithel, UA 1 <=5 /HPF    Mucus, UA Present (!) None Seen lpf       RADIOLOGY:  Reviewed all pertinent imaging. Please see official radiology report.  Ct Abdomen Pelvis Without Oral With Iv Contrast    Result Date: 6/23/2021  EXAM: CT ABDOMEN PELVIS WO ORAL W IV CONTRAST LOCATION: [facility] DATE/TIME: 6/23/2021 8:53 PM INDICATION: LLQ pain COMPARISON: Chest CT 7/6/2020. PET/CT 7/6/2018 TECHNIQUE: CT scan of the abdomen and pelvis was performed following injection of IV contrast. Multiplanar reformats were obtained. Dose reduction techniques were used. CONTRAST: Iopamidol (Isovue-370) 100mL FINDINGS: LOWER CHEST: Fiducial markers in mild localized fibrosis within lingula. Linear scarring medial right lung base unchanged. Tiny pulmonary nodules at both lung bases unchanged. Stable pleural plaque present posteriorly, bilaterally. HEPATOBILIARY: Cholelithiasis unchanged, no acute inflammation. Innumerable hepatic cysts stable. PANCREAS: Normal. SPLEEN: Normal. ADRENAL GLANDS: Stable presumed to centimeter right adrenal adenoma. KIDNEYS/BLADDER: Bilateral renal cysts present. No hydronephrosis. There is a new right sided internal/external ureteral stent but the proximal pigtail at level of mid right ureter. There is mild wall thickening and enhancement of the uroepithelium of proximal right ureter and renal pelvis. Ileal loop present, nondilated. BOWEL: No obstruction or inflammatory change. LYMPH NODES: Normal. VASCULATURE: Heavy diffuse atherosclerotic  plaque. PELVIC ORGANS: Prominent streak artifact. Presumed cystoprostatectomy. No pelvic mass evident. There is a gas collection in the left perirectal region but this appears to be unchanged compared to prior PET/CT and likely represents redundant portion of rectum. There is no surrounding inflammatory reaction. MUSCULOSKELETAL: Bilateral hip arthroplasties. Degenerative changes of spine. No suspicious bony lesion.     1.  No definite etiology for patient's symptoms. Nothing obstructive or inflammatory involving bowel. 2.  Right external/internal ureteral stent does appear somewhat low in position with its proximal pigtail loop within mid right ureter but there is no right hydronephrosis. Mild enhancement and wall thickening involving the uroepithelium proximal to the stent. 3.  No left hydronephrosis. 4.  Cholelithiasis unchanged.      EKG:    none    PROCEDURES:   none      I, Mayo Ruffin, am serving as a scribe to document services personally performed by Dr. Escobar based on my observation and the provider's statements to me. I, Lidia Escobar MD attest that Mayo Ruffin is acting in a scribe capacity, has observed my performance of the services and has documented them in accordance with my direction.    Lidia Escobar M.D.  Emergency Medicine  [facility] EMERGENCY ROOM  [id number] [place] [address]  Dept: [phone]  Loc: [phone]           Lidia Escobar MD  06/24/21 0438

## 2021-06-27 ENCOUNTER — HEALTH MAINTENANCE LETTER (OUTPATIENT)
Age: 86
End: 2021-06-27

## 2021-06-27 LAB
BACTERIA SPEC CULT: ABNORMAL
BACTERIA SPEC CULT: ABNORMAL

## 2021-07-06 VITALS — WEIGHT: 170 LBS | BODY MASS INDEX: 23.8 KG/M2 | HEIGHT: 71 IN

## 2021-08-12 ENCOUNTER — HOSPITAL ENCOUNTER (OUTPATIENT)
Dept: CT IMAGING | Facility: CLINIC | Age: 86
Discharge: HOME | End: 2021-08-12
Attending: RADIOLOGY | Admitting: RADIOLOGY
Payer: COMMERCIAL

## 2021-08-12 DIAGNOSIS — C34.12 MALIGNANT NEOPLASM OF UPPER LOBE OF LEFT LUNG (H): ICD-10-CM

## 2021-08-12 PROCEDURE — 71250 CT THORAX DX C-: CPT

## 2021-08-13 ENCOUNTER — LAB (OUTPATIENT)
Dept: LAB | Facility: CLINIC | Age: 86
End: 2021-08-13
Payer: COMMERCIAL

## 2021-08-13 DIAGNOSIS — N28.89 MASS OF URETER: ICD-10-CM

## 2021-08-13 PROCEDURE — 87086 URINE CULTURE/COLONY COUNT: CPT

## 2021-08-13 PROCEDURE — 87186 SC STD MICRODIL/AGAR DIL: CPT

## 2021-08-15 LAB
BACTERIA UR CULT: ABNORMAL
BACTERIA UR CULT: ABNORMAL

## 2021-08-15 NOTE — RESULT ENCOUNTER NOTE
Please call him and inform him that the urine sample from last week did grow a bacteria called E. coli.  The results were sent to the urologist at [facility].  See if anyone has contacted him about the culture results and if antibiotics are needed.  I suspect that they wanted to check this before his upcoming surgical procedure.  If he has not been contacted by the [facility] urologist he may want to contact them.  If need be I can prescribe an antibiotic but I would rather that the urologist at [facility] manage that because he was the one who ordered the test.Dr. Dyer

## 2021-08-16 ENCOUNTER — OFFICE VISIT (OUTPATIENT)
Dept: RADIATION ONCOLOGY | Facility: CLINIC | Age: 86
End: 2021-08-16
Attending: RADIOLOGY
Payer: COMMERCIAL

## 2021-08-16 ENCOUNTER — TELEPHONE (OUTPATIENT)
Dept: FAMILY MEDICINE | Facility: CLINIC | Age: 86
End: 2021-08-16

## 2021-08-16 VITALS
RESPIRATION RATE: 16 BRPM | WEIGHT: 181.2 LBS | TEMPERATURE: 98 F | SYSTOLIC BLOOD PRESSURE: 151 MMHG | DIASTOLIC BLOOD PRESSURE: 64 MMHG | BODY MASS INDEX: 25.27 KG/M2 | HEART RATE: 63 BPM | OXYGEN SATURATION: 97 %

## 2021-08-16 DIAGNOSIS — C34.12 MALIGNANT NEOPLASM OF UPPER LOBE OF LEFT LUNG (H): Primary | ICD-10-CM

## 2021-08-16 PROCEDURE — 99212 OFFICE O/P EST SF 10 MIN: CPT | Performed by: RADIOLOGY

## 2021-08-16 PROCEDURE — G0463 HOSPITAL OUTPT CLINIC VISIT: HCPCS

## 2021-08-16 RX ORDER — BUDESONIDE AND FORMOTEROL FUMARATE DIHYDRATE 160; 4.5 UG/1; UG/1
2 AEROSOL RESPIRATORY (INHALATION)
COMMUNITY
End: 2022-04-28

## 2021-08-16 RX ORDER — ALBUTEROL SULFATE 90 UG/1
2 AEROSOL, METERED RESPIRATORY (INHALATION)
COMMUNITY
Start: 2020-09-22 | End: 2021-09-22

## 2021-08-16 RX ORDER — NAPROXEN SODIUM 220 MG
220 TABLET ORAL
COMMUNITY
End: 2021-09-20

## 2021-08-16 RX ORDER — LOSARTAN POTASSIUM 100 MG/1
TABLET ORAL
COMMUNITY
Start: 2021-03-22 | End: 2022-03-30

## 2021-08-16 RX ORDER — AMLODIPINE BESYLATE 10 MG/1
TABLET ORAL
COMMUNITY
Start: 2021-03-22 | End: 2022-03-30

## 2021-08-16 NOTE — LETTER
8/16/2021         RE: Karen Santiago  [address]  [facility] [address]        Dear Colleague,    Thank you for referring your patient, Karen Santiago, to the [facility] RADIATION [facility]COL[facility] [place]. Please see a copy of my visit note below.    Patient here ambulatory for follow up s/p radiation for his lung cancer.  Patient denies c/o today.  CT scan done last week and here today for results.  Seen by Dr. Hammond.  Plan return to clinic for follow up as directed by physician.    [facility] Radiation Oncology Follow Up Note    Patient: Karen Santiago  MRN: [id number]  Date of Service: 08/16/2021    Assessment:       ICD-10-CM    1. Malignant neoplasm of upper lobe of left lung (H)  C34.12 CT Chest w/o contrast          Impression/Plan:   86 year old male with history of left lung nodule treated with SBRT for an 11 mm lung presumed NSCLC in posterior lingula, treated 5000 cGy/5 fx finished on 8/23/2018. Previous COPD exacerbation, complete resolution with steroids and abx.      1. Reviewed CT chest without contrast on 8/12/2021. No evidence of new or recurrent disease. Discussed results with patient and he is reassured.    2. Follow up in 1 year for repeat CT chest w/o contrast and office visit.     Subjective:     HPI:  Karen Santiago is a 86 year old male who was treated with SBRT for an 11 mm lung nodule in posterior lingula presumed to be cancer.     The patient has a history of urothelial and bladder carcinoma since 2002. Recurrence in 2012 and he underwent a cystoprostatectomy with ileal conduit urinary diversion on 10/15/2012. No neoadjuvant chemotherapy. He had recurrence in the left distal ureter and underwent a left distal ureterectomy with the excision of the cuff and reanastomosis 1/21/2013. 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 He required a stent which was removed.     June 2014 patient  was following up with pulmonology as there was some concern for anterior mediastinal lesion, initially thought to be a small benign anterior mediastinal cyst. Noted at that time were numerous small and tiny pulmonary nodules which had been stable.      CT chest on 5/4/2018 showed an 11 mm semisolid nodule with 3 mm solid component in the lingula posteriorly which is increased from a 6 x 3 mm ground glass nodule seen previously on 4/11/2017. His other multiple tiny bilateral solid pulmonary nodules remain unchanged     SITE TREATED: DANA  TOTAL DOSE: 5000  NUMBER OF FRACTIONS: 5  DATES COMPLETED: 8/23/2018  CONCURRENT CHEMOTHERAPY: No  ADJUVANT THERAPY:No     He tolerated the treatment without unexpected side effects.     The patient presents for routine follow up. He has no complaints today. No shortness of breath or breathing issues lately. Was seen in June 2021 for hydronephrosis which was corrected with stenting without complications, has follow up with [facility] soon.     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 per [facility]     C TOTAL HIP ARTHROPLASTY      Description: Total Hip Replacement;  Proc Date: 10/17/2012;     HC REMOVAL TESTIS,SIMPLE      Description: Orchiectomy Left;  Recorded: 04/26/2010;  Comments: for benign interstitial tumor     HC REPAIR UMBILICAL IRLANDA,<4Y/O,REDUC      Description: Umbilical Hernia Repair;  Recorded: 04/26/2010;     Current  Outpatient Medications   Medication     acetaminophen 500 MG CAPS     albuterol (PROAIR HFA/PROVENTIL HFA/VENTOLIN HFA) 108 (90 Base) MCG/ACT inhaler     amLODIPine (NORVASC) 10 MG tablet     budesonide-formoterol (SYMBICORT) 160-4.5 MCG/ACT Inhaler     losartan (COZAAR) 100 MG tablet     naproxen sodium (ANAPROX) 220 MG tablet     vitamin B-12 (CYANOCOBALAMIN) 100 MCG tablet     No current facility-administered medications for this visit.     Patient has no known allergies.  Social History     Socioeconomic History     Marital status:      Spouse name: Not on file     Number of children: Not on file     Years of education: Not on file     Highest education level: Not on file   Occupational History     Not on file   Tobacco Use     Smoking status: Former Smoker     Types: Cigarettes     Quit date: 1980     Years since quittin.6     Smokeless tobacco: Never Used   Substance and Sexual Activity     Alcohol use: No     Comment: Alcoholic Drinks/day: 2-3 beer per month     Drug use: No     Sexual activity: Not on file   Other Topics Concern     Not on file   Social History Narrative     Not on file     Social Determinants of Health     Financial Resource Strain:      Difficulty of Paying Living Expenses:    Food Insecurity:      Worried About Running Out of Food in the Last Year:      Ran Out of Food in the Last Year:    Transportation Needs:      Lack of Transportation (Medical):      Lack of Transportation (Non-Medical):    Physical Activity:      Days of Exercise per Week:      Minutes of Exercise per Session:    Stress:      Feeling of Stress :    Social Connections:      Frequency of Communication with Friends and Family:      Frequency of Social Gatherings with Friends and Family:      Attends [employer] Services:      Active Member of Clubs or Organizations:      Attends Club or Organization Meetings:      Marital Status:    Intimate Partner Violence:      Fear of Current or Ex-Partner:       Emotionally Abused:      Physically Abused:      Sexually Abused:        ROS:  Reviewed with Karen Santiago today.    General  Constitutional  Constitutional (WDL): All constitutional elements are within defined limits  EENT  Eye Disorders  Eye Disorder (WDL): All eye disorder elements are within defined limits  Ear Disorders  Ear Disorder (WDL): All ear disorder elements are within defined limits  Respiratory    Respiratory  Respiratory (WDL): Exceptions to WDL  Dyspnea: Shortness of breath with moderate exertion  Cardiovascular  Cardiovascular  Cardiovascular (WDL): All cardiovascular elements are within defined limits  Gastrointestinal  Gastrointestinal  Gastrointestinal (WDL): All gastrointestinal elements are within defined limits  Musculoskeletal  Musculoskeletal and Connective Tissue Disorders  Musculoskeletal & Connective (WDL): All musculoskeletal & connective elements are within defined limits  Integumentary              Integumentary  Integumentary (WDL): All integumentary elements are within defined limits  Neurological  Neurosensory  Neurosensory (WDL): All neurosensory elements are within defined limits  Genitourinary/Reproductive  Genitourinary  Genitourinary (WDL): All genitourinary elements are within defined limits  Lymphatic   Lymph System Disorders  Lymph (WDL): All lymph elements are within defined limits  Patient Coping  Patient Coping: Accepting;Open/discussion  Pain   Pain Score: No Pain (0)   AUA Assessment                                                                         Accompanied by  Accompanied By: self only      Objective:        Exam:    Vitals:    08/16/21 1100 08/16/21 1114   BP:  (!) 151/64   Pulse:  63   Resp:  16   Temp:  98  F (36.7  C)   TempSrc:  Oral   SpO2:  97%   Weight:  82.2 kg (181 lb 3.2 oz)   PainSc: No Pain (0)      GENERAL: No acute distress. Cooperative in conversation. Mask on.   RESP: Normal respiratory effort.   NEURO: Non focal. Alert and oriented x3.    PSYCH: Within normal limits. No depression or anxiety.  SKIN: Warm dry intact.         Recent Labs:   Recent Results (from the past 168 hour(s))   Urine Culture    Specimen: Urine, Midstream   Result Value Ref Range    Culture Urogenital Malorie with     Culture >100,000 CFU/mL Escherichia coli (A)        Susceptibility    Escherichia coli - XU     Ampicillin <=4 Susceptible ug/mL     Cefazolin 2 Susceptible ug/mL     Cefepime <=1 Susceptible ug/mL     Ceftriaxone <=1 Susceptible ug/mL     Ciprofloxacin >2 Resistant ug/mL     Gentamicin <=2 Susceptible ug/mL     Levofloxacin >4 Resistant ug/mL     Meropenem <=0.5 Susceptible ug/mL     Nitrofurantoin <=16 Susceptible ug/mL     Tetracycline <=2 Susceptible ug/mL     Tobramycin <=2 Susceptible ug/mL     Trimethoprim/Sulfa <=0.5 Susceptible ug/mL       Imaging: Imaging results 30 days: CT Chest w/o Contrast    Result Date: 8/12/2021  EXAM: CT CHEST WITHOUT CONTRAST LOCATION: [facility] DATE/TIME: 08/12/2021, 12:24 PM INDICATION: Radiation to left upper lobe 08/2018. Routine imaging. COMPARISON: Chest CT, 07/06/2020. TECHNIQUE: CT chest without IV contrast. Multiplanar reformats were obtained. Dose reduction techniques were used. CONTRAST: None. FINDINGS: LUNGS AND PLEURA: There is a 2.8 x 1.5 cm bandlike area of opacity involving the lingula with two adjacent fiducial markers. This is unchanged when measured in a similar fashion on 07/06/2020 CT and compatible with posttreatment changes. There are multiple small bilateral pulmonary nodules which are stable. Bilateral small pleural calcifications are noted as present previously. Mild emphysema. MEDIASTINUM/AXILLAE: Heart size normal. The thoracic aorta is normal in caliber. There is a borderline prominent lymph node within the anterior mediastinum on image 91 of series 3 which is stable. CORONARY ARTERY CALCIFICATION: Severe. UPPER ABDOMEN: Cholelithiasis. There are multiple benign-appearing  hepatic cysts. Bilateral renal cysts, partially visualized. There are also probable renal vascular calcifications bilaterally. Right adrenal adenoma, benign. MUSCULOSKELETAL: Unremarkable.     IMPRESSION: 1.  Stable posttreatment changes/scarring involving the left upper lobe compatible with prior radiation therapy. No compelling evidence of recurrent malignancy. 2.  Stable small bilateral pulmonary nodules. 3.  Cholelithiasis.       Pathology:   No results found for this or any previous visit (from the past 8760 hour(s)).        30 minutes spent on the date of the encounter doing chart review, review of outside records, review of test results, interpretation of tests, patient visit, documentation, personal view of images.        I, Alesia Hammond MD personally performed the services described in this documentation, as scribed by Behzad Plummer in my presence, and it is both accurate and complete.    Signed by: Alesia Hammond MD, MPH        Again, thank you for allowing me to participate in the care of your patient.        Sincerely,        Alesia Hammond MD

## 2021-08-16 NOTE — PROGRESS NOTES
[facility] Radiation Oncology Follow Up Note    Patient: Karen Santiago  MRN: [id number]  Date of Service: 08/16/2021    Assessment:       ICD-10-CM    1. Malignant neoplasm of upper lobe of left lung (H)  C34.12 CT Chest w/o contrast          Impression/Plan:   86 year old male with history of left lung nodule treated with SBRT for an 11 mm lung presumed NSCLC in posterior lingula, treated 5000 cGy/5 fx finished on 8/23/2018. Previous COPD exacerbation, complete resolution with steroids and abx.      1. Reviewed CT chest without contrast on 8/12/2021. No evidence of new or recurrent disease. Discussed results with patient and he is reassured.    2. Follow up in 1 year for repeat CT chest w/o contrast and office visit.     Subjective:     HPI:  Karen Santiago is a 86 year old male who was treated with SBRT for an 11 mm lung nodule in posterior lingula presumed to be cancer.     The patient has a history of urothelial and bladder carcinoma since 2002. Recurrence in 2012 and he underwent a cystoprostatectomy with ileal conduit urinary diversion on 10/15/2012. No neoadjuvant chemotherapy. He had recurrence in the left distal ureter and underwent a left distal ureterectomy with the excision of the cuff and reanastomosis 1/21/2013. 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 He required a stent which was removed.     June 2014 patient was following up with pulmonology as there was some concern for anterior mediastinal lesion, initially thought to be a small benign anterior mediastinal cyst. Noted at that time were numerous small and tiny pulmonary nodules which had been stable.      CT chest on 5/4/2018 showed an 11 mm semisolid nodule with 3 mm solid component in the lingula posteriorly which is increased from a 6 x 3 mm ground glass nodule seen previously on 4/11/2017. His other multiple tiny bilateral solid pulmonary nodules remain  unchanged     SITE TREATED: DANA  TOTAL DOSE: 5000  NUMBER OF FRACTIONS: 5  DATES COMPLETED: 8/23/2018  CONCURRENT CHEMOTHERAPY: No  ADJUVANT THERAPY:No     He tolerated the treatment without unexpected side effects.     The patient presents for routine follow up. He has no complaints today. No shortness of breath or breathing issues lately. Was seen in June 2021 for hydronephrosis which was corrected with stenting without complications, has follow up with [facility] soon.     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 per [facility]     C TOTAL HIP ARTHROPLASTY      Description: Total Hip Replacement;  Proc Date: 10/17/2012;     HC REMOVAL TESTIS,SIMPLE      Description: Orchiectomy Left;  Recorded: 04/26/2010;  Comments: for benign interstitial tumor     HC REPAIR UMBILICAL IRLANDA,<6Y/O,REDUC      Description: Umbilical Hernia Repair;  Recorded: 04/26/2010;     Current Outpatient Medications   Medication     acetaminophen 500 MG CAPS     albuterol (PROAIR HFA/PROVENTIL HFA/VENTOLIN HFA) 108 (90 Base) MCG/ACT inhaler     amLODIPine (NORVASC) 10 MG tablet     budesonide-formoterol (SYMBICORT) 160-4.5 MCG/ACT Inhaler     losartan (COZAAR) 100 MG tablet     naproxen sodium (ANAPROX) 220 MG tablet     vitamin B-12 (CYANOCOBALAMIN) 100 MCG tablet     No current facility-administered medications for this visit.     Patient has no known allergies.  Social History      Socioeconomic History     Marital status:      Spouse name: Not on file     Number of children: Not on file     Years of education: Not on file     Highest education level: Not on file   Occupational History     Not on file   Tobacco Use     Smoking status: Former Smoker     Types: Cigarettes     Quit date: 1980     Years since quittin.6     Smokeless tobacco: Never Used   Substance and Sexual Activity     Alcohol use: No     Comment: Alcoholic Drinks/day: 2-3 beer per month     Drug use: No     Sexual activity: Not on file   Other Topics Concern     Not on file   Social History Narrative     Not on file     Social Determinants of Health     Financial Resource Strain:      Difficulty of Paying Living Expenses:    Food Insecurity:      Worried About Running Out of Food in the Last Year:      Ran Out of Food in the Last Year:    Transportation Needs:      Lack of Transportation (Medical):      Lack of Transportation (Non-Medical):    Physical Activity:      Days of Exercise per Week:      Minutes of Exercise per Session:    Stress:      Feeling of Stress :    Social Connections:      Frequency of Communication with Friends and Family:      Frequency of Social Gatherings with Friends and Family:      Attends [employer] Services:      Active Member of Clubs or Organizations:      Attends Club or Organization Meetings:      Marital Status:    Intimate Partner Violence:      Fear of Current or Ex-Partner:      Emotionally Abused:      Physically Abused:      Sexually Abused:        ROS:  Reviewed with Karen Santiago today.    General  Constitutional  Constitutional (WDL): All constitutional elements are within defined limits  EENT  Eye Disorders  Eye Disorder (WDL): All eye disorder elements are within defined limits  Ear Disorders  Ear Disorder (WDL): All ear disorder elements are within defined limits  Respiratory    Respiratory  Respiratory (WDL): Exceptions to WDL  Dyspnea: Shortness of breath  with moderate exertion  Cardiovascular  Cardiovascular  Cardiovascular (WDL): All cardiovascular elements are within defined limits  Gastrointestinal  Gastrointestinal  Gastrointestinal (WDL): All gastrointestinal elements are within defined limits  Musculoskeletal  Musculoskeletal and Connective Tissue Disorders  Musculoskeletal & Connective (WDL): All musculoskeletal & connective elements are within defined limits  Integumentary              Integumentary  Integumentary (WDL): All integumentary elements are within defined limits  Neurological  Neurosensory  Neurosensory (WDL): All neurosensory elements are within defined limits  Genitourinary/Reproductive  Genitourinary  Genitourinary (WDL): All genitourinary elements are within defined limits  Lymphatic   Lymph System Disorders  Lymph (WDL): All lymph elements are within defined limits  Patient Coping  Patient Coping: Accepting;Open/discussion  Pain   Pain Score: No Pain (0)   AUA Assessment                                                                         Accompanied by  Accompanied By: self only      Objective:        Exam:    Vitals:    08/16/21 1100 08/16/21 1114   BP:  (!) 151/64   Pulse:  63   Resp:  16   Temp:  98  F (36.7  C)   TempSrc:  Oral   SpO2:  97%   Weight:  82.2 kg (181 lb 3.2 oz)   PainSc: No Pain (0)      GENERAL: No acute distress. Cooperative in conversation. Mask on.   RESP: Normal respiratory effort.   NEURO: Non focal. Alert and oriented x3.   PSYCH: Within normal limits. No depression or anxiety.  SKIN: Warm dry intact.         Recent Labs:   Recent Results (from the past 168 hour(s))   Urine Culture    Specimen: Urine, Midstream   Result Value Ref Range    Culture Urogenital Malorie with     Culture >100,000 CFU/mL Escherichia coli (A)        Susceptibility    Escherichia coli - XU     Ampicillin <=4 Susceptible ug/mL     Cefazolin 2 Susceptible ug/mL     Cefepime <=1 Susceptible ug/mL     Ceftriaxone <=1 Susceptible ug/mL      Ciprofloxacin >2 Resistant ug/mL     Gentamicin <=2 Susceptible ug/mL     Levofloxacin >4 Resistant ug/mL     Meropenem <=0.5 Susceptible ug/mL     Nitrofurantoin <=16 Susceptible ug/mL     Tetracycline <=2 Susceptible ug/mL     Tobramycin <=2 Susceptible ug/mL     Trimethoprim/Sulfa <=0.5 Susceptible ug/mL       Imaging: Imaging results 30 days: CT Chest w/o Contrast    Result Date: 8/12/2021  EXAM: CT CHEST WITHOUT CONTRAST LOCATION: [facility] DATE/TIME: 08/12/2021, 12:24 PM INDICATION: Radiation to left upper lobe 08/2018. Routine imaging. COMPARISON: Chest CT, 07/06/2020. TECHNIQUE: CT chest without IV contrast. Multiplanar reformats were obtained. Dose reduction techniques were used. CONTRAST: None. FINDINGS: LUNGS AND PLEURA: There is a 2.8 x 1.5 cm bandlike area of opacity involving the lingula with two adjacent fiducial markers. This is unchanged when measured in a similar fashion on 07/06/2020 CT and compatible with posttreatment changes. There are multiple small bilateral pulmonary nodules which are stable. Bilateral small pleural calcifications are noted as present previously. Mild emphysema. MEDIASTINUM/AXILLAE: Heart size normal. The thoracic aorta is normal in caliber. There is a borderline prominent lymph node within the anterior mediastinum on image 91 of series 3 which is stable. CORONARY ARTERY CALCIFICATION: Severe. UPPER ABDOMEN: Cholelithiasis. There are multiple benign-appearing hepatic cysts. Bilateral renal cysts, partially visualized. There are also probable renal vascular calcifications bilaterally. Right adrenal adenoma, benign. MUSCULOSKELETAL: Unremarkable.     IMPRESSION: 1.  Stable posttreatment changes/scarring involving the left upper lobe compatible with prior radiation therapy. No compelling evidence of recurrent malignancy. 2.  Stable small bilateral pulmonary nodules. 3.  Cholelithiasis.       Pathology:   No results found for this or any previous visit  (from the past 8760 hour(s)).        30 minutes spent on the date of the encounter doing chart review, review of outside records, review of test results, interpretation of tests, patient visit, documentation, personal view of images.        I, Alesia Hammond MD personally performed the services described in this documentation, as scribed by Behzad Plummer in my presence, and it is both accurate and complete.    Signed by: Alesia Hammond MD, MPH

## 2021-08-16 NOTE — TELEPHONE ENCOUNTER
Reason for Call:  Other prescription    Detailed comments: pt called to let you know that he rec'd the message about his urine sample, but he has already spoke to [facility] and they will take care of the rx if need be and wants to thank  for all you have done for him    Phone Number Patient can be reached at: Home number on file [phone] (home)    Best Time:     Can we leave a detailed message on this number? YES    Call taken on 8/16/2021 at 10:16 AM by Loni Castillo

## 2021-08-16 NOTE — PROGRESS NOTES
Patient here ambulatory for follow up s/p radiation for his lung cancer.  Patient denies c/o today.  CT scan done last week and here today for results.  Seen by Dr. Hammond.  Plan return to clinic for follow up as directed by physician.

## 2021-09-13 ENCOUNTER — TELEPHONE (OUTPATIENT)
Dept: FAMILY MEDICINE | Facility: CLINIC | Age: 86
End: 2021-09-13

## 2021-09-13 NOTE — TELEPHONE ENCOUNTER
Reason for call:  Patient reporting a symptom    Symptom or request: couging up mucous, but only at night, has been going on for a week. Unable to sleep.  Had this issue 2 years ago and Fr. Guevara gave him a rx. And a steroid for the same issue. Was wanting to be seen, but we do not have any openings.  Has been sleeping sitting up in his lounge chair, needs to sleep.  No temp., no other sx              Duration (how long have symptoms been present): 1 week    Have you been treated for this before? No    Additional comments:   Wants something to help him today please    Phone Number patient can be reached at:  Home number on file [phone] (home)    Best Time:    asap  Can we leave a detailed message on this number:  YES    Call taken on 9/13/2021 at 8:19 AM by Loni Castillo

## 2021-09-14 ENCOUNTER — VIRTUAL VISIT (OUTPATIENT)
Dept: FAMILY MEDICINE | Facility: CLINIC | Age: 86
End: 2021-09-14
Payer: COMMERCIAL

## 2021-09-14 DIAGNOSIS — J44.1 COPD EXACERBATION (H): Primary | ICD-10-CM

## 2021-09-14 PROCEDURE — 99442 PR PHYSICIAN TELEPHONE EVALUATION 11-20 MIN: CPT | Mod: 95 | Performed by: FAMILY MEDICINE

## 2021-09-14 RX ORDER — PREDNISONE 20 MG/1
40 TABLET ORAL
Qty: 6 TABLET | Refills: 0 | Status: SHIPPED | OUTPATIENT
Start: 2021-09-14 | End: 2021-09-17

## 2021-09-14 RX ORDER — DOXYCYCLINE 100 MG/1
100 CAPSULE ORAL 2 TIMES DAILY
Qty: 20 CAPSULE | Refills: 0 | Status: SHIPPED | OUTPATIENT
Start: 2021-09-14 | End: 2021-09-24

## 2021-09-14 NOTE — PROGRESS NOTES
Karen is a 86 year old who is being evaluated via a billable telephone visit.      What phone number would you like to be contacted at? [phone]  How would you like to obtain your AVS? Mail a copy    Assessment & Plan     COPD exacerbation (H)  - doxycycline hyclate (VIBRAMYCIN) 100 MG capsule  Dispense: 20 capsule; Refill: 0  - predniSONE (DELTASONE) 20 MG tablet  Dispense: 6 tablet; Refill: 0     Patient Instructions   I believe that your symptoms of cough and shortness of breath are suggestive of a COPD exacerbation.  I would recommend treatment with a 10 day course of an antibiotic called doxycycline (taken twice daily) PLUS 3 days of an oral steroid called prednisone (2 tablets taken with breakfast for 3 days).  I expect that within 2-3 days of starting these, you would be starting to feel some improvement.  Please let us know if you are not beginning to see improvement by the end of the week.  Otherwise, you could follow-up on your cough with Dr Dyer when you see him again on Monday 9/20/21.    Return in about 6 days (around 9/20/2021) for visit with Dr Dyer.    Subjective   Karen is a 86 year old who presents for the following health issues      HPI   Persistent cough -patient reports persistent cough present for the last 10 days.  He notes mild associated shortness of breath with physical activities.  History significant for COPD currently managed with use of Symbicort inhaler and as needed albuterol.  Patient reports that he has been using his albuterol more frequently since the cough had begun.  He does note some mild improvement for short periods after using the albuterol.  He denies any associated chest pain, fevers or chills.  Patient did have a Covid test performed at the [facility] on 8/26/2021, which was noted to be negative.  Patient recalls having had a similar episode approximately 2 years ago.  He recalls that Dr. Guevara had prescribed and antibiotic as well as oral prednisone, which had  been effective in resolving his symptoms.  He does not usually get allergy symptoms at this time of year.  He does note that the current cough is worse when lying down due to mucus drainage.  No known contacts with similar symptoms.      Prior to onset of these symptoms patient had been feeling very well from a respiratory standpoint.  He has been seeing urology at the [facility] frequently recently.  On 8/27/2021, patient underwent right retrograde ureteroscopy and biopsy, which had shown no evidence of urothelial carcinoma.     Review of Systems   ENT/MOUTH: minimal clear rhinorrhea  RESP:see HPI.   CV: NEGATIVE for chest pain, palpitations or peripheral edema  GI: NEGATIVE for nausea, abdominal pain, heartburn, or change in bowel habits      Objective       Vitals:  No vitals were obtained today due to virtual visit.    Physical Exam   Objective    General: alert/oriented to person/place. Answers questions appropriately.   Affect: pleasant, cooperative.   Respiratory: no labored breathing noted.  No coughing during visit.    Phone call duration: 14 minutes    Iain Sánchez MD  [facility]

## 2021-09-14 NOTE — LETTER
[facility]  [address]  [place]  [place] [id number] [phone]       September 14, 2021      Karen Santiago  [address] [place] [facility] [address]              Dear Karen,    Enclosed is an After Visit Summary from our phone visit this morning.  Please let us know if you have any questions or if you are not noticing improvement within a few days of starting the prednisone.        Take care,  Iian Sánchez MD

## 2021-09-14 NOTE — PATIENT INSTRUCTIONS
I believe that your symptoms of cough and shortness of breath are suggestive of a COPD exacerbation.  I would recommend treatment with a 10 day course of an antibiotic called doxycycline (taken twice daily) PLUS 3 days of an oral steroid called prednisone (2 tablets taken with breakfast for 3 days).  I expect that within 2-3 days of starting these, you would be starting to feel some improvement.  Please let us know if you are not beginning to see improvement by the end of the week.  Otherwise, you could follow-up on your cough with Dr Dyer when you see him again on Monday 9/20/21.

## 2021-09-20 ENCOUNTER — OFFICE VISIT (OUTPATIENT)
Dept: FAMILY MEDICINE | Facility: CLINIC | Age: 86
End: 2021-09-20
Payer: COMMERCIAL

## 2021-09-20 VITALS
HEART RATE: 74 BPM | BODY MASS INDEX: 24.18 KG/M2 | WEIGHT: 173.4 LBS | SYSTOLIC BLOOD PRESSURE: 122 MMHG | OXYGEN SATURATION: 96 % | DIASTOLIC BLOOD PRESSURE: 52 MMHG

## 2021-09-20 DIAGNOSIS — Z93.6 S/P ILEAL CONDUIT (H): ICD-10-CM

## 2021-09-20 DIAGNOSIS — N18.31 CHRONIC KIDNEY DISEASE, STAGE 3A (H): ICD-10-CM

## 2021-09-20 DIAGNOSIS — D50.9 IRON DEFICIENCY ANEMIA, UNSPECIFIED IRON DEFICIENCY ANEMIA TYPE: ICD-10-CM

## 2021-09-20 DIAGNOSIS — I10 ESSENTIAL HYPERTENSION, BENIGN: ICD-10-CM

## 2021-09-20 DIAGNOSIS — D64.9 ANEMIA, UNSPECIFIED TYPE: ICD-10-CM

## 2021-09-20 DIAGNOSIS — Z00.00 HEALTH CARE MAINTENANCE: ICD-10-CM

## 2021-09-20 DIAGNOSIS — Z23 NEED FOR INFLUENZA VACCINATION: ICD-10-CM

## 2021-09-20 DIAGNOSIS — E53.8 B12 DEFICIENCY: ICD-10-CM

## 2021-09-20 DIAGNOSIS — C66.1 MALIGNANT NEOPLASM OF RIGHT URETER (H): ICD-10-CM

## 2021-09-20 DIAGNOSIS — J43.9 PULMONARY EMPHYSEMA, UNSPECIFIED EMPHYSEMA TYPE (H): Primary | ICD-10-CM

## 2021-09-20 LAB
BASOPHILS # BLD AUTO: 0 10E3/UL (ref 0–0.2)
BASOPHILS NFR BLD AUTO: 1 %
EOSINOPHIL # BLD AUTO: 0.1 10E3/UL (ref 0–0.7)
EOSINOPHIL NFR BLD AUTO: 1 %
ERYTHROCYTE [DISTWIDTH] IN BLOOD BY AUTOMATED COUNT: 15 % (ref 10–15)
HCT VFR BLD AUTO: 37.7 % (ref 40–53)
HGB BLD-MCNC: 11.8 G/DL (ref 13.3–17.7)
IMM GRANULOCYTES # BLD: 0 10E3/UL
IMM GRANULOCYTES NFR BLD: 1 %
IRON SATN MFR SERPL: 13 % (ref 20–50)
IRON SERPL-MCNC: 36 UG/DL (ref 42–175)
LYMPHOCYTES # BLD AUTO: 1.5 10E3/UL (ref 0.8–5.3)
LYMPHOCYTES NFR BLD AUTO: 26 %
MCH RBC QN AUTO: 28 PG (ref 26.5–33)
MCHC RBC AUTO-ENTMCNC: 31.3 G/DL (ref 31.5–36.5)
MCV RBC AUTO: 90 FL (ref 78–100)
MONOCYTES # BLD AUTO: 0.5 10E3/UL (ref 0–1.3)
MONOCYTES NFR BLD AUTO: 8 %
NEUTROPHILS # BLD AUTO: 3.7 10E3/UL (ref 1.6–8.3)
NEUTROPHILS NFR BLD AUTO: 63 %
PLATELET # BLD AUTO: 301 10E3/UL (ref 150–450)
RBC # BLD AUTO: 4.21 10E6/UL (ref 4.4–5.9)
TIBC SERPL-MCNC: 278 UG/DL (ref 313–563)
TRANSFERRIN SERPL-MCNC: 222 MG/DL (ref 212–360)
WBC # BLD AUTO: 5.8 10E3/UL (ref 4–11)

## 2021-09-20 PROCEDURE — 99214 OFFICE O/P EST MOD 30 MIN: CPT | Mod: 25 | Performed by: FAMILY MEDICINE

## 2021-09-20 PROCEDURE — 90662 IIV NO PRSV INCREASED AG IM: CPT | Performed by: FAMILY MEDICINE

## 2021-09-20 PROCEDURE — G0008 ADMIN INFLUENZA VIRUS VAC: HCPCS | Performed by: FAMILY MEDICINE

## 2021-09-20 PROCEDURE — 36415 COLL VENOUS BLD VENIPUNCTURE: CPT | Performed by: FAMILY MEDICINE

## 2021-09-20 PROCEDURE — 85025 COMPLETE CBC W/AUTO DIFF WBC: CPT | Performed by: FAMILY MEDICINE

## 2021-09-20 PROCEDURE — 83540 ASSAY OF IRON: CPT | Performed by: FAMILY MEDICINE

## 2021-09-20 PROCEDURE — 84466 ASSAY OF TRANSFERRIN: CPT | Performed by: FAMILY MEDICINE

## 2021-09-21 RX ORDER — FERROUS SULFATE 325(65) MG
325 TABLET ORAL
COMMUNITY
Start: 2021-09-21 | End: 2022-08-29 | Stop reason: SINTOL

## 2021-09-21 NOTE — RESULT ENCOUNTER NOTE
Please call the patient and inform him that his anemia is stable with a hemoglobin of 11.8 however his iron numbers are decreased and I would like for him to start an iron supplement.  The iron supplement is ferrous sulfate 325 mg once daily with breakfast.  This is over-the-counter and does not require a prescription.  Iron is absorbed better through the GI tract if he takes it with some vitamin C which could be either a 4 ounce glass of orange juice or a vitamin C tablet 500 mg at the same time as the iron supplement.  The vitamin C also is over-the-counter.    When he returns in 6 months we will recheck his lab work to make sure that his hemoglobin and iron has improved.    Thank you,Dr. Dyer

## 2021-09-21 NOTE — PROGRESS NOTES
ASSESSMENT/PLAN:       1. Pulmonary emphysema, unspecified emphysema type (H)  Treatment for his COPD includes albuterol inhaler as rescue which she has been using about every 4 hours recently along with Symbicort 160-4.5 mcg 2 puffs twice daily.  The patient will finish the doxycycline.    2. Essential hypertension, benign  The patient's blood pressure is controlled on his current medication without change.    3. Anemia, unspecified type     - CBC with Platelets & Differential, hemoglobin is stable at 11.8 with iron studies that show a low iron at 36 with a percent saturation is low at 13 and an iron binding capacity that is low at 278.  Suggestive of mixed anemia of chronic disease with possible iron deficiency that is present.  We will treat with a iron supplement 325 mg ferrous sulfate on a daily basis    - Iron binding panel    4. Need for influenza vaccination     - [place] FLU VACCINE, INCREASED ANTIGEN, PRESV FREE    5. Health care maintenance     - REVIEW OF HEALTH MAINTENANCE PROTOCOL ORDERS  Care gaps reviewed  Should have Medicare annual wellness visit in the future    6. 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 Chronic kidney disease, stage 3a  Try to limit or avoid nonsteroidal anti-inflammatory medications    9. B12 deficiency  Treated with vitamin B12 orally at a low dose but has sufficient replacement      Level of medical decision making moderate  Follow-up 6 months annual wellness visit  Test results phone call  Number and complexity of conditions addressed include 2 or more chronic stable conditions  Amount of data reviewed included 2 unique tests that were ordered, reviewed and managed along with time spent looking in care everywhere [facility] urology  Risk of complications moderate requiring prescription drug management        No immunizations due    Doe Dyer MD      PROGRESS NOTE    9/21/2021    SUBJECTIVE:  [id number]  who presents for   Chief Complaint   Patient presents with     Medication Update     No concerns. Willing to do flu shot but still has a couple days of antibiotics.      The patient is seen today for med checkup and review of his chronic conditions.    I note that since last seen the patient's weight is down about 7-8 pounds.    September 14, 2021 the patient had a COPD exacerbation and was treated with doxycycline for 7 days and prednisone 20 mg daily for 6 days.  He still has 1 day of doxycycline left.  He feels that he is nearly 100% better.  His cough and shortness of breath has improved significantly.    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  That was treated.  Now more recently has had a similar situation in the right distal ureter which was biopsied and showed a low-grade papillary urothelial carcinoma and this was in June 2021 and was treated with laser tumor ablation he had in August 2021 at [facility].  He currently has a stent in the right side and tomorrow will be going to mail to have that removed.    Recent history of anemia with a hemoglobin of 12.5 in March 2021 and of 11.4 in July 2021.  The patient has had a history of vitamin B12 deficiency and treated with oral vitamin B12 and note that he had an April 2021 his vitamin B12 level was 914.    History of hypertension that is controlled with amlodipine 10 mg daily  Losartan 100 mg daily    Patient wishes to have an influenza vaccine    The patient has chronic kidney disease with the most recent GFR of 49    History of tumor in the left upper lobe of the lung with a CT August 2021 status post radiation therapy showing residual scar without any other evidence for tumor.    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)     Malignant neoplasm of bladder (H)     Malignant neoplasm of ureter (H)     Other specified disorders of kidney and ureter       Current Outpatient Medications   Medication Sig Dispense Refill     acetaminophen 500 MG CAPS Take 1,000 mg by mouth       albuterol (PROAIR HFA/PROVENTIL HFA/VENTOLIN HFA) 108 (90 Base) MCG/ACT inhaler Inhale 2 puffs into the lungs       amLODIPine (NORVASC) 10 MG tablet TAKE ONE TABLET BY MOUTH ONE TIME DAILY       budesonide-formoterol (SYMBICORT) 160-4.5 MCG/ACT Inhaler Inhale 2 puffs into the lungs       doxycycline hyclate (VIBRAMYCIN) 100 MG capsule Take 1 capsule (100 mg) by mouth 2 times daily for 10 days 20 capsule 0     ferrous sulfate (FEROSUL) 325 (65 Fe) MG tablet Take 1 tablet (325 mg) by mouth daily (with breakfast)       losartan (COZAAR) 100 MG tablet TAKE 1 TABLET BY MOUTH EVERY DAY .       vitamin B-12 (CYANOCOBALAMIN) 100 MCG tablet Take 100 mcg by mouth         History   Smoking Status     Former Smoker     Types: Cigarettes     Quit date: 1/1/1980   Smokeless Tobacco     Never Used           OBJECTIVE:      Recent Results (from the past 48 hour(s))   Iron binding panel    Collection Time: 09/20/21  9:55 AM   Result Value Ref Range    Iron 36 (L) 42 - 175 ug/dL    Transferrin 222 212 - 360 mg/dL    Transferrin Saturation, Calculated 13 (L) 20 - 50 %    Transferrin IBC, Calculated 278 (L) 313 - 563 ug/dL   CBC with platelets and differential    Collection Time: 09/20/21  9:55 AM   Result Value Ref Range    WBC Count 5.8 4.0 - 11.0 10e3/uL    RBC Count 4.21 (L) 4.40 - 5.90 10e6/uL    Hemoglobin 11.8 (L) 13.3 - 17.7 g/dL    Hematocrit 37.7 (L) 40.0 - 53.0 %    MCV 90 78 - 100 fL    MCH 28.0 26.5 - 33.0 pg    MCHC 31.3 (L) 31.5  - 36.5 g/dL    RDW 15.0 10.0 - 15.0 %    Platelet Count 301 150 - 450 10e3/uL    % Neutrophils 63 %    % Lymphocytes 26 %    % Monocytes 8 %    % Eosinophils 1 %    % Basophils 1 %    % Immature Granulocytes 1 %    Absolute Neutrophils 3.7 1.6 - 8.3 10e3/uL    Absolute Lymphocytes 1.5 0.8 - 5.3 10e3/uL    Absolute Monocytes 0.5 0.0 - 1.3 10e3/uL    Absolute Eosinophils 0.1 0.0 - 0.7 10e3/uL    Absolute Basophils 0.0 0.0 - 0.2 10e3/uL    Absolute Immature Granulocytes 0.0 <=0.0 10e3/uL       Vitals:    09/20/21 0912   BP: 122/52   BP Location: Right arm   Patient Position: Sitting   Cuff Size: Adult Regular   Pulse: 74   SpO2: 96%   Weight: 78.7 kg (173 lb 6.4 oz)     Wt Readings from Last 3 Encounters:   09/20/21 78.7 kg (173 lb 6.4 oz)   08/16/21 82.2 kg (181 lb 3.2 oz)   03/22/21 82.1 kg (181 lb)           Physical Exam:  GENERAL APPEARANCE: Very pleasant 86-year-old male who appears younger than his stated age, NAD, well hydrated, well nourished  SKIN:  Normal skin turgor, no lesions/rashes   HEENT: moist mucous membranes, no rhinorrhea  NECK: Normal without adenopathy or masses, no carotid bruits  CV: RRR, no M/G/R   LUNGS: CTAB  ABDOMEN: S&NT, no masses or enlarged organs   EXTREMITY: no edema and full ROM of all joints  NEURO: no focal findings

## 2021-09-22 ENCOUNTER — TELEPHONE (OUTPATIENT)
Dept: FAMILY MEDICINE | Facility: CLINIC | Age: 86
End: 2021-09-22

## 2021-09-22 DIAGNOSIS — J44.9 COPD (CHRONIC OBSTRUCTIVE PULMONARY DISEASE) (H): ICD-10-CM

## 2021-09-22 DIAGNOSIS — J44.1 COPD EXACERBATION (H): Primary | ICD-10-CM

## 2021-09-22 RX ORDER — ALBUTEROL SULFATE 90 UG/1
AEROSOL, METERED RESPIRATORY (INHALATION)
Qty: 8.5 G | Refills: 5 | Status: SHIPPED | OUTPATIENT
Start: 2021-09-22 | End: 2022-10-25

## 2021-09-22 NOTE — TELEPHONE ENCOUNTER
"  Disp Refills Start End MIKE    albuterol (PROAIR HFA) 90 mcg/actuation inhaler 8.5 g 1 9/22/2020  No   Sig - Route: Inhale 2 puffs every 4 (four) hours as needed for wheezing. - Inhalation   Sent to pharmacy as: albuterol sulfate HFA 90 mcg/actuation aerosol inhaler (ProAir HFA)   Notes to Pharmacy: May substitute the equivalent medication per insurance preference.   E-Prescribing Status: Receipt confirmed by pharmacy (9/22/2020  3:10 PM CDT)       Last Written Prescription Date:  9/22/20  Last Fill Quantity: 8.5 g,  # refills: 1   Last office visit provider:  9/20/21     Requested Prescriptions   Pending Prescriptions Disp Refills     albuterol (PROAIR HFA/PROVENTIL HFA/VENTOLIN HFA) 108 (90 Base) MCG/ACT inhaler [Pharmacy Med Name: Albuterol Sulfate HFA Inhalation Aerosol Solution 108 (90 Base) MCG/ACT] 18 g 0     Sig: INHALE TWO PUFFS BY MOUTH EVERY FOUR HOURS AS NEEDED FOR WHEEZING       Asthma Maintenance Inhalers - Anticholinergics Passed - 9/22/2021  9:57 AM        Passed - Patient is age 12 years or older        Passed - Recent (12 mo) or future (30 days) visit within the authorizing provider's specialty     Patient has had an office visit with the authorizing provider or a provider within the authorizing providers department within the previous 12 mos or has a future within next 30 days. See \"Patient Info\" tab in inbasket, or \"Choose Columns\" in Meds & Orders section of the refill encounter.              Passed - Medication is active on med list       Short-Acting Beta Agonist Inhalers Protocol  Passed - 9/22/2021  9:57 AM        Passed - Patient is age 12 or older        Passed - Recent (12 mo) or future (30 days) visit within the authorizing provider's specialty     Patient has had an office visit with the authorizing provider or a provider within the authorizing providers department within the previous 12 mos or has a future within next 30 days. See \"Patient Info\" tab in inbasket, or \"Choose Columns\" in " Meds & Orders section of the refill encounter.              Passed - Medication is active on med list             Bao Garza RN 09/22/21 12:32 PM

## 2021-09-22 NOTE — TELEPHONE ENCOUNTER
----- Message from Doe Dyer MD sent at 9/21/2021  3:09 PM CDT -----  Please call the patient and inform him that his anemia is stable with a hemoglobin of 11.8 however his iron numbers are decreased and I would like for him to start an iron supplement.  The iron supplement is ferrous sulfate 325 mg once daily with breakfast.  This is over-the-counter and does not require a prescription.  Iron is absorbed better through the GI tract if he takes it with some vitamin C which could be either a 4 ounce glass of orange juice or a vitamin C tablet 500 mg at the same time as the iron supplement.  The vitamin C also is over-the-counter.    When he returns in 6 months we will recheck his lab work to make sure that his hemoglobin and iron has improved.    Thank you,Dr. Dyer

## 2021-09-22 NOTE — TELEPHONE ENCOUNTER
Left message to call back for: Patient, left message with wife to call back.  Information to relay to patient: See note below from Dr. Dyer.

## 2021-09-22 NOTE — TELEPHONE ENCOUNTER
Pt returned call, was informed of Dr. Dyer's message and the note was printed out and mailed to the pt.

## 2022-03-21 ENCOUNTER — OFFICE VISIT (OUTPATIENT)
Dept: FAMILY MEDICINE | Facility: CLINIC | Age: 87
End: 2022-03-21
Payer: COMMERCIAL

## 2022-03-21 VITALS
DIASTOLIC BLOOD PRESSURE: 56 MMHG | SYSTOLIC BLOOD PRESSURE: 116 MMHG | BODY MASS INDEX: 25.51 KG/M2 | OXYGEN SATURATION: 96 % | HEART RATE: 68 BPM | WEIGHT: 182.9 LBS

## 2022-03-21 DIAGNOSIS — I10 ESSENTIAL HYPERTENSION, BENIGN: ICD-10-CM

## 2022-03-21 DIAGNOSIS — J43.9 PULMONARY EMPHYSEMA, UNSPECIFIED EMPHYSEMA TYPE (H): Primary | ICD-10-CM

## 2022-03-21 DIAGNOSIS — N18.31 CHRONIC KIDNEY DISEASE, STAGE 3A (H): ICD-10-CM

## 2022-03-21 DIAGNOSIS — D50.9 IRON DEFICIENCY ANEMIA, UNSPECIFIED IRON DEFICIENCY ANEMIA TYPE: ICD-10-CM

## 2022-03-21 DIAGNOSIS — C66.1 MALIGNANT NEOPLASM OF RIGHT URETER (H): ICD-10-CM

## 2022-03-21 DIAGNOSIS — C34.12 MALIGNANT NEOPLASM OF UPPER LOBE OF LEFT LUNG (H): ICD-10-CM

## 2022-03-21 DIAGNOSIS — Z93.6 S/P ILEAL CONDUIT (H): ICD-10-CM

## 2022-03-21 LAB
ANION GAP SERPL CALCULATED.3IONS-SCNC: 11 MMOL/L (ref 5–18)
BUN SERPL-MCNC: 28 MG/DL (ref 8–28)
CALCIUM SERPL-MCNC: 9.4 MG/DL (ref 8.5–10.5)
CHLORIDE BLD-SCNC: 108 MMOL/L (ref 98–107)
CO2 SERPL-SCNC: 22 MMOL/L (ref 22–31)
CREAT SERPL-MCNC: 0.87 MG/DL (ref 0.7–1.3)
ERYTHROCYTE [DISTWIDTH] IN BLOOD BY AUTOMATED COUNT: 15 % (ref 10–15)
GFR SERPL CREATININE-BSD FRML MDRD: 84 ML/MIN/1.73M2
GLUCOSE BLD-MCNC: 114 MG/DL (ref 70–125)
HCT VFR BLD AUTO: 41.7 % (ref 40–53)
HGB BLD-MCNC: 13.6 G/DL (ref 13.3–17.7)
IRON SATN MFR SERPL: 23 % (ref 20–50)
IRON SERPL-MCNC: 82 UG/DL (ref 42–175)
MCH RBC QN AUTO: 30.2 PG (ref 26.5–33)
MCHC RBC AUTO-ENTMCNC: 32.6 G/DL (ref 31.5–36.5)
MCV RBC AUTO: 93 FL (ref 78–100)
PLATELET # BLD AUTO: 194 10E3/UL (ref 150–450)
POTASSIUM BLD-SCNC: 5 MMOL/L (ref 3.5–5)
RBC # BLD AUTO: 4.5 10E6/UL (ref 4.4–5.9)
SODIUM SERPL-SCNC: 141 MMOL/L (ref 136–145)
TIBC SERPL-MCNC: 353 UG/DL (ref 313–563)
TRANSFERRIN SERPL-MCNC: 282 MG/DL (ref 212–360)
WBC # BLD AUTO: 5.1 10E3/UL (ref 4–11)

## 2022-03-21 PROCEDURE — 99214 OFFICE O/P EST MOD 30 MIN: CPT | Performed by: FAMILY MEDICINE

## 2022-03-21 PROCEDURE — 84466 ASSAY OF TRANSFERRIN: CPT | Performed by: FAMILY MEDICINE

## 2022-03-21 PROCEDURE — 85027 COMPLETE CBC AUTOMATED: CPT | Performed by: FAMILY MEDICINE

## 2022-03-21 PROCEDURE — 36415 COLL VENOUS BLD VENIPUNCTURE: CPT | Performed by: FAMILY MEDICINE

## 2022-03-21 PROCEDURE — 83540 ASSAY OF IRON: CPT | Performed by: FAMILY MEDICINE

## 2022-03-21 PROCEDURE — 80048 BASIC METABOLIC PNL TOTAL CA: CPT | Performed by: FAMILY MEDICINE

## 2022-03-21 NOTE — PATIENT INSTRUCTIONS
Patient Education     Iron Supplements  Most people think of iron as a metal used to make pots, frying pans, and soup kettles. This same metal (or mineral) also plays a vital role in the body. Iron helps the blood cells carry oxygen. When you don t get enough iron, you may feel tired and lack energy. Over time, without enough iron, your body makes fewer red blood cells. This can cause a condition called iron-deficiency anemia. Since your body doesn t make iron, you must get it from foods or supplements.  Food sources of iron  Iron is found in a few types of foods. Good sources include:    Red meat, poultry, fish, eggs    Dried fruits (especially raisins, prunes, figs)    Legumes such as dried beans and lentils    Breads and cereals with iron added    Blackstrap molasses    Spinach    Foods cooked in cast-iron pans. This is especially true of acidic foods, such as tomatoes and christiano.  Why use a supplement?  Women often need additional iron because they lose blood during their menstrual period. But even grown men and boys may need a supplement at some point. You may need an iron supplement if any of the following is true for you:    I rarely eat foods that are high in iron (such as red meat, poultry, dried beans, and foods with iron added).    I am a vegetarian and I rarely eat legumes (dried beans, peas, lentils).    I have heavy menstrual periods.    I am pregnant or breastfeeding.    I have been diagnosed with iron-deficiency anemia.  If you take iron  Here are some tips to help you get the most from an iron supplement:    Take it with vitamin C for better absorption.    Don t take an iron supplement with milk. The calcium in milk limits iron absorption.    Iron supplements can cause constipation. To prevent this, drink plenty of water, eat high-fiber foods, and exercise often. Also try an iron supplement with an added stool softener.    Eat a healthy diet to get all the nutrients your body needs.  Caution  The  amount of iron each person needs is different, and varies by age. Women who are pregnant or breastfeeding need extra iron. But taking more than the suggested amount is not always healthy. Your healthcare provider can help you choose the right amount of iron for you.  Siri last reviewed this educational content on 5/1/2020 2000-2021 [employer]. All rights reserved. This information is not intended as a substitute for professional medical care. Always follow your healthcare professional's instructions.

## 2022-03-21 NOTE — LETTER
March 22, 2022      Karen V Pamela  [address] [place] [facility] [address]        Dear ,    I am writing to inform you of your test results.    Karen,  It was nice to see you in the clinic this past week.  Your test results are back and I would like to go over those results.    The iron studies have improved very nicely and you have done a good job with changing your diet to get more iron.  Your numbers are normal now with iron value of 82 and a transferrin saturation at 23%.  Thus I would continue with your dietary changes and if you want to take a multivitamin with iron that would be perfectly fine but nothing more should be needed.    The metabolic panel was normal.  Chloride of 108 is not of a concern.  Your kidney function is normal with a GFR of 84 and a blood glucose value of 114 is not at the level of diabetes.  Your other electrolytes look normal.    Your CBC also is now normal with a hemoglobin of 13.6 up from 11.8.  Normal white blood cells and platelets.    I would recommend that I see you back in 6 months for a Medicare annual wellness visit along with reviewing your chronic conditions, medications and need for laboratory tests.    Resulted Orders   CBC with platelets   Result Value Ref Range    WBC Count 5.1 4.0 - 11.0 10e3/uL    RBC Count 4.50 4.40 - 5.90 10e6/uL    Hemoglobin 13.6 13.3 - 17.7 g/dL    Hematocrit 41.7 40.0 - 53.0 %    MCV 93 78 - 100 fL    MCH 30.2 26.5 - 33.0 pg    MCHC 32.6 31.5 - 36.5 g/dL    RDW 15.0 10.0 - 15.0 %    Platelet Count 194 150 - 450 10e3/uL   Iron binding panel   Result Value Ref Range    Iron 82 42 - 175 ug/dL    Transferrin 282 212 - 360 mg/dL    Transferrin Saturation, Calculated 23 20 - 50 %    Transferrin IBC, Calculated 353 313 - 563 ug/dL   Basic metabolic panel   Result Value Ref Range    Sodium 141 136 - 145 mmol/L    Potassium 5.0 3.5 - 5.0 mmol/L    Chloride 108 (H) 98 - 107 mmol/L    Carbon Dioxide (CO2) 22 22 - 31 mmol/L    Anion Gap  11 5 - 18 mmol/L    Urea Nitrogen 28 8 - 28 mg/dL    Creatinine 0.87 0.70 - 1.30 mg/dL    Calcium 9.4 8.5 - 10.5 mg/dL    Glucose 114 70 - 125 mg/dL    GFR Estimate 84 >60 mL/min/1.73m2      Comment:      Effective December 21, 2021 eGFRcr in adults is calculated using the 2021 CKD-EPI creatinine equation which includes age and gender (Roxanna et al., NEJM, DOI: 10.1056/IHDOac9793381)       If you have any questions or concerns, please call the clinic at the number listed above.       Sincerely,        Doe Dyer MD

## 2022-03-22 NOTE — PROGRESS NOTES
ASSESSMENT/PLAN:       1. Pulmonary emphysema, unspecified emphysema type (H)     - CBC with platelets, hemoglobin 13.6 which now is normal and and and down to 11.8 most recently  The patient seems to be at a stable place with his COPD and will continue with Symbicort and rescue inhaler albuterol    2. Malignant neoplasm of right ureter (H)  Patient follows up with [facility] urology on a yearly basis and that will likely be sometime this summer.    3. Iron deficiency anemia, unspecified iron deficiency anemia type     - CBC with platelets, hemoglobin now 13.6    - Iron binding panel normal values now with an iron of 82 up from 36 and percent saturation 23% compared to 13%.  Suggest that the patient take a multivitamin with iron daily  Gave him information on iron rich foods    4. Essential hypertension, benign     - Basic metabolic panel, GFR 84 with blood glucose 114 and normal electrolytes  Continue on the current regiment for hypertension that being losartan 100 mg daily and amlodipine 10 mg daily    5. Chronic kidney disease, stage 3a (H)     - Basic metabolic panel, GFR is now 84 so does not have chronic kidney disease    6. 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 S/P ileal conduit (H)  Follow-up [facility] yearly urology department    No immunizations due    Patient should follow-up in 6 months for a Medicare annual wellness visit  Test results by letter    Doe Dyer MD      PROGRESS NOTE   3/22/2022    SUBJECTIVE:  [id number]  who presents for   Chief Complaint   Patient presents with     RECHECK     Following up on anemia and iron recheck. Patient only took for the first week because he got extremely constipated. Has attempted to change diet instead.      The patient is here to follow-up on his chronic conditions as well as his subacute issues with anemia.    The patient has a history of COPD primarily emphysema which is  treated with Symbicort 160-4.5 mcg 2 puffs daily.  Albuterol rescue inhaler as needed    The patient has a history of a malignant neoplasm right ureter that dates back to 2002 and then he had a recurrence about 10 years later.  He has a ileal conduit and has done very well with that without any significant recent urinary tract infections.    Patient has had a history of an iron deficiency anemia with a hemoglobin of 11.2 in April 2021.  The patient was encouraged to use iron supplement but he has not tolerated that well because of constipation.  Thus he is try to increase the iron in his foods.  He is feeling better.  He also has had a low vitamin B12 and is on B12 supplement with the last B12 level of 914 last year.    Hypertension treated with losartan 100 mg daily and amlodipine 10 mg daily.  No edema    Chronic kidney disease 3 a per history    Patient had a upper lobe left lung mass suspected to be a malignancy.  No tissue diagnosis but empirically treated with radiation therapy focused to the mass 5 treatments with good results.    The patient exercises on his treadmill and also does yard work in the summer months.    The patient's wife has advanced dementia and may need to have a long-term care facility in the near future.    Patient Active Problem List   Diagnosis     Primary Osteoarthritis Of The Lumbar Vertebrae     Obesity     B12 deficiency     Benign Essential Hypertension     Squamous Cell Carcinoma Of The Skin     S/P ileal conduit (H)     COPD (chronic obstructive pulmonary disease) (H)     (Presumed) malignant neoplasm of upper lobe of left lung (H)     Malignant neoplasm of bladder (H)     Malignant neoplasm of ureter (H)     Other specified disorders of kidney and ureter       Current Outpatient Medications   Medication Sig Dispense Refill     acetaminophen 500 MG CAPS Take 1,000 mg by mouth       albuterol (PROAIR HFA/PROVENTIL HFA/VENTOLIN HFA) 108 (90 Base) MCG/ACT inhaler INHALE TWO PUFFS BY  MOUTH EVERY FOUR HOURS AS NEEDED FOR WHEEZING 8.5 g 5     amLODIPine (NORVASC) 10 MG tablet TAKE ONE TABLET BY MOUTH ONE TIME DAILY       budesonide-formoterol (SYMBICORT) 160-4.5 MCG/ACT Inhaler Inhale 2 puffs into the lungs       ferrous sulfate (FEROSUL) 325 (65 Fe) MG tablet Take 1 tablet (325 mg) by mouth daily (with breakfast)       losartan (COZAAR) 100 MG tablet TAKE 1 TABLET BY MOUTH EVERY DAY .       vitamin B-12 (CYANOCOBALAMIN) 100 MCG tablet Take 100 mcg by mouth         History   Smoking Status     Former Smoker     Types: Cigarettes     Quit date: 1/1/1980   Smokeless Tobacco     Never Used         OBJECTIVE:      Recent Results (from the past 48 hour(s))   Iron binding panel    Collection Time: 03/21/22 10:47 AM   Result Value Ref Range    Iron 82 42 - 175 ug/dL    Transferrin 282 212 - 360 mg/dL    Transferrin Saturation, Calculated 23 20 - 50 %    Transferrin IBC, Calculated 353 313 - 563 ug/dL   Basic metabolic panel    Collection Time: 03/21/22 10:47 AM   Result Value Ref Range    Sodium 141 136 - 145 mmol/L    Potassium 5.0 3.5 - 5.0 mmol/L    Chloride 108 (H) 98 - 107 mmol/L    Carbon Dioxide (CO2) 22 22 - 31 mmol/L    Anion Gap 11 5 - 18 mmol/L    Urea Nitrogen 28 8 - 28 mg/dL    Creatinine 0.87 0.70 - 1.30 mg/dL    Calcium 9.4 8.5 - 10.5 mg/dL    Glucose 114 70 - 125 mg/dL    GFR Estimate 84 >60 mL/min/1.73m2   CBC with platelets    Collection Time: 03/21/22 10:54 AM   Result Value Ref Range    WBC Count 5.1 4.0 - 11.0 10e3/uL    RBC Count 4.50 4.40 - 5.90 10e6/uL    Hemoglobin 13.6 13.3 - 17.7 g/dL    Hematocrit 41.7 40.0 - 53.0 %    MCV 93 78 - 100 fL    MCH 30.2 26.5 - 33.0 pg    MCHC 32.6 31.5 - 36.5 g/dL    RDW 15.0 10.0 - 15.0 %    Platelet Count 194 150 - 450 10e3/uL       Vitals:    03/21/22 1014   BP: 116/56   BP Location: Left arm   Patient Position: Sitting   Cuff Size: Adult Regular   Pulse: 68   SpO2: 96%   Weight: 83 kg (182 lb 14.4 oz)     Wt Readings from Last 3 Encounters:    03/21/22 83 kg (182 lb 14.4 oz)   09/20/21 78.7 kg (173 lb 6.4 oz)   08/16/21 82.2 kg (181 lb 3.2 oz)           Physical Exam:  GENERAL APPEARANCE: Pleasant 87-year-old male, NAD, but does deal with a great deal of stress related to the need to care for his wife who has advancing dementia.  Well hydrated, well nourished  SKIN:  Normal skin turgor, no lesions/rashes   HEENT: moist mucous membranes, no rhinorrhea  NECK: Normal without adenopathy or masses  CV: RRR, no M/G/R   LUNGS: CTAB  ABDOMEN: S&NT, no masses or enlarged organs   EXTREMITY: no edema and full ROM of all joints  NEURO: no focal findings

## 2022-03-28 DIAGNOSIS — I10 ESSENTIAL HYPERTENSION, BENIGN: Primary | ICD-10-CM

## 2022-03-30 RX ORDER — LOSARTAN POTASSIUM 100 MG/1
100 TABLET ORAL DAILY
Qty: 90 TABLET | Refills: 3 | Status: SHIPPED | OUTPATIENT
Start: 2022-03-30 | End: 2023-03-20

## 2022-03-30 RX ORDER — AMLODIPINE BESYLATE 10 MG/1
10 TABLET ORAL DAILY
Qty: 90 TABLET | Refills: 3 | Status: SHIPPED | OUTPATIENT
Start: 2022-03-30 | End: 2023-03-20

## 2022-03-30 NOTE — TELEPHONE ENCOUNTER
"Outpatient Medication Detail     Disp Refills Start End MIKE   amLODIPine (NORVASC) 10 MG tablet 90 tablet 3 3/22/2021  No   Sig: TAKE ONE TABLET BY MOUTH ONE TIME DAILY   Sent to pharmacy as: amLODIPine 10 mg tablet (NORVASC)   E-Prescribing Status: Receipt confirmed by pharmacy (3/22/2021 10:42 AM CDT)     Outpatient Medication Detail     Disp Refills Start End MIKE   losartan (COZAAR) 100 MG tablet 90 tablet 3 3/22/2021  No   Sig: TAKE 1 TABLET BY MOUTH EVERY DAY .   Sent to pharmacy as: losartan 100 mg tablet (COZAAR)   E-Prescribing Status: Receipt confirmed by pharmacy (3/22/2021 10:42 AM CDT)     Last office visit provider:  3/21/22     Requested Prescriptions   Pending Prescriptions Disp Refills     amLODIPine (NORVASC) 10 MG tablet [Pharmacy Med Name: amLODIPine Besylate Oral Tablet 10 MG] 90 tablet 0     Sig: TAKE ONE TABLET BY MOUTH ONE TIME DAILY       Calcium Channel Blockers Protocol  Passed - 3/28/2022  9:17 AM        Passed - Blood pressure under 140/90 in past 12 months     BP Readings from Last 3 Encounters:   03/21/22 116/56   09/20/21 122/52   08/16/21 (!) 151/64                 Passed - Recent (12 mo) or future (30 days) visit within the authorizing provider's specialty     Patient has had an office visit with the authorizing provider or a provider within the authorizing providers department within the previous 12 mos or has a future within next 30 days. See \"Patient Info\" tab in inbasket, or \"Choose Columns\" in Meds & Orders section of the refill encounter.              Passed - Medication is active on med list        Passed - Patient is age 18 or older        Passed - Normal serum creatinine on file in past 12 months     Recent Labs   Lab Test 03/21/22  1047   CR 0.87       Ok to refill medication if creatinine is low             losartan (COZAAR) 100 MG tablet [Pharmacy Med Name: Losartan Potassium Oral Tablet 100 MG] 90 tablet 0     Sig: TAKE 1 TABLET BY MOUTH EVERY DAY .       Angiotensin-II " "Receptors Passed - 3/30/2022  9:24 AM        Passed - Last blood pressure under 140/90 in past 12 months     BP Readings from Last 3 Encounters:   03/21/22 116/56   09/20/21 122/52   08/16/21 (!) 151/64                 Passed - Recent (12 mo) or future (30 days) visit within the authorizing provider's specialty     Patient has had an office visit with the authorizing provider or a provider within the authorizing providers department within the previous 12 mos or has a future within next 30 days. See \"Patient Info\" tab in inbasket, or \"Choose Columns\" in Meds & Orders section of the refill encounter.              Passed - Medication is active on med list        Passed - Patient is age 18 or older        Passed - Normal serum creatinine on file in past 12 months     Recent Labs   Lab Test 03/21/22  1047   CR 0.87       Ok to refill medication if creatinine is low          Passed - Normal serum potassium on file in past 12 months     Recent Labs   Lab Test 03/21/22  1047   POTASSIUM 5.0                         Bao Garza RN 03/30/22 9:25 AM  "

## 2022-04-26 DIAGNOSIS — J43.9 PULMONARY EMPHYSEMA, UNSPECIFIED EMPHYSEMA TYPE (H): Primary | ICD-10-CM

## 2022-04-28 RX ORDER — BUDESONIDE AND FORMOTEROL FUMARATE DIHYDRATE 160; 4.5 UG/1; UG/1
AEROSOL RESPIRATORY (INHALATION)
Qty: 30.6 G | Refills: 1 | Status: SHIPPED | OUTPATIENT
Start: 2022-04-28 | End: 2022-10-25

## 2022-04-28 NOTE — TELEPHONE ENCOUNTER
"  Outpatient Medication Detail     Disp Refills Start End MIKE   SYMBICORT 160-4.5 mcg/actuation inhaler 3 Inhaler 3 3/23/2021  Yes   Sig - Route: Inhale 2 puffs 2 (two) times a day. - Inhalation   Sent to pharmacy as: Symbicort 160 mcg-4.5 mcg/actuation HFA aerosol inhaler   Notes to Pharmacy: Brand name due to insurance. This replaces generic symbicort   E-Prescribing Status: Receipt confirmed by pharmacy (3/23/2021  4:22 PM CDT)     Last office visit provider:  3/21/22     Requested Prescriptions   Pending Prescriptions Disp Refills     SYMBICORT 160-4.5 MCG/ACT Inhaler [Pharmacy Med Name: Symbicort Inhalation Aerosol 160-4.5 MCG/ACT] 30.6 g 0     Sig: Inhale 2 puffs by inhalation route 2 times a day.       Inhaled Steroids Protocol Passed - 4/28/2022  9:30 AM        Passed - Patient is age 12 or older        Passed - Recent (12 mo) or future (30 days) visit within the authorizing provider's specialty     Patient has had an office visit with the authorizing provider or a provider within the authorizing providers department within the previous 12 mos or has a future within next 30 days. See \"Patient Info\" tab in inbasket, or \"Choose Columns\" in Meds & Orders section of the refill encounter.              Passed - Medication is active on med list       Long-Acting Beta Agonist Inhalers Protocol  Passed - 4/28/2022  9:30 AM        Passed - Patient is age 12 or older        Passed - Recent (12 mo) or future (30 days) visit within the authorizing provider's specialty     Patient has had an office visit with the authorizing provider or a provider within the authorizing providers department within the previous 12 mos or has a future within next 30 days. See \"Patient Info\" tab in inbasket, or \"Choose Columns\" in Meds & Orders section of the refill encounter.              Passed - Medication is active on med list             Bao Garza RN 04/28/22 9:30 AM    "

## 2022-08-16 ENCOUNTER — HOSPITAL ENCOUNTER (OUTPATIENT)
Dept: CT IMAGING | Facility: CLINIC | Age: 87
Discharge: HOME | End: 2022-08-16
Attending: RADIOLOGY | Admitting: RADIOLOGY
Payer: COMMERCIAL

## 2022-08-16 DIAGNOSIS — C34.12 MALIGNANT NEOPLASM OF UPPER LOBE OF LEFT LUNG (H): ICD-10-CM

## 2022-08-16 PROCEDURE — 71250 CT THORAX DX C-: CPT

## 2022-08-29 ENCOUNTER — OFFICE VISIT (OUTPATIENT)
Dept: RADIATION ONCOLOGY | Facility: CLINIC | Age: 87
End: 2022-08-29
Attending: RADIOLOGY
Payer: COMMERCIAL

## 2022-08-29 VITALS
SYSTOLIC BLOOD PRESSURE: 150 MMHG | BODY MASS INDEX: 25.91 KG/M2 | WEIGHT: 185.8 LBS | DIASTOLIC BLOOD PRESSURE: 70 MMHG | RESPIRATION RATE: 16 BRPM | OXYGEN SATURATION: 96 % | HEART RATE: 67 BPM

## 2022-08-29 DIAGNOSIS — C34.12 MALIGNANT NEOPLASM OF UPPER LOBE OF LEFT LUNG (H): Primary | ICD-10-CM

## 2022-08-29 PROCEDURE — G0463 HOSPITAL OUTPT CLINIC VISIT: HCPCS

## 2022-08-29 PROCEDURE — 99215 OFFICE O/P EST HI 40 MIN: CPT | Performed by: RADIOLOGY

## 2022-08-29 ASSESSMENT — PAIN SCALES - GENERAL: PAINLEVEL: NO PAIN (0)

## 2022-08-29 NOTE — PROGRESS NOTES
[facility] Radiation Oncology Follow Up Note    Patient: Karen Santiago  MRN: [id number]  Date of Service: 08/29/2022    Assessment:       ICD-10-CM    1. 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 Previous COPD exacerbation, complete resolution with steroids and abx.      1. Reviewed CT chest without contrast on 8/16/2022. No evidence of new or recurrent disease. Discussed results with patients.     2. Follow up in 1 year for repeat CT chest w/o contrast and office visit, sooner if worsening SOB or other concerns.     3. Continue f/u with [facility] as directed for urologic cancers    Subjective:     HPI:  Karen Santiago is a 87 year old male who was treated with SBRT for an 11 mm lung nodule in posterior lingula presumed to be cancer.     The patient has a history of urothelial and bladder carcinoma since 2002. Recurrence in 2012 and he underwent a cystoprostatectomy with ileal conduit urinary diversion on 10/15/2012. No neoadjuvant chemotherapy. He had recurrence in the left distal ureter and underwent a left distal ureterectomy with the excision of the cuff and reanastomosis 1/21/2013. 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 He required a stent which was removed. Currently HANNY from urological standpoint.      June 2014 patient was following up with pulmonology as there was some concern for anterior mediastinal lesion, initially thought to be a small benign anterior mediastinal cyst. Noted at that time were numerous small and tiny pulmonary nodules which had been stable.      CT chest on 5/4/2018 showed an 11 mm semisolid nodule with 3 mm solid component in the lingula posteriorly which is increased from a 6 x 3 mm  ground glass nodule seen previously on 4/11/2017. His other multiple tiny bilateral solid pulmonary nodules remain unchanged     SITE TREATED: DANA  TOTAL DOSE: 5000  NUMBER OF FRACTIONS: 5  DATES COMPLETED: 8/23/2018  CONCURRENT CHEMOTHERAPY: No  ADJUVANT THERAPY:No     He tolerated the treatment without unexpected side effects.      The patient presents for routine follow up. No complaints today.     Past Medical History:   Diagnosis Date     Cancer (H)     bladder     COPD (chronic obstructive pulmonary disease) (H)      Hypertension      Rheumatoid arthritis (H)      Past Surgical History:   Procedure Laterality Date     HC REMOVAL TESTIS,SIMPLE      Description: Orchiectomy Left;  Recorded: 04/26/2010;  Comments: for benign interstitial tumor     HC REPAIR UMBILICAL IRLANDA,<6Y/O,REDUC      Description: Umbilical Hernia Repair;  Recorded: 04/26/2010;     [facility] CYSTECTOMY,ILEAL CONDUIT/SIGMOID BLADDER      Description: Complete Bladder Cystectomy With Ureteroileal Conduit;  Proc Date: 01/01/2002;  Comments: cystoprostatectomy for squamous cell carcinoma per [facility] REMV PROSTATE,RETROPUBIC,SUBTOTAL      Description: Prostatectomy, Retropubic;  Recorded: 05/19/2009;  Comments: 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 per [facility] TOTAL HIP ARTHROPLASTY      Description: Total Hip Replacement;  Proc Date: 10/17/2012;     Current Outpatient Medications   Medication     acetaminophen 500 MG CAPS     albuterol (PROAIR HFA/PROVENTIL HFA/VENTOLIN HFA) 108 (90 Base) MCG/ACT inhaler     amLODIPine (NORVASC) 10 MG tablet     losartan (COZAAR) 100 MG tablet     SYMBICORT 160-4.5 MCG/ACT Inhaler     vitamin B-12 (CYANOCOBALAMIN) 100 MCG tablet     No current facility-administered medications for this visit.     Patient has no known allergies.  Social History     Socioeconomic History     Marital status:      Spouse name: Not on file     Number of children: Not on file     Years of education: Not on  file     Highest education level: Not on file   Occupational History     Not on file   Tobacco Use     Smoking status: Former Smoker     Types: Cigarettes     Quit date: 1980     Years since quittin.6     Smokeless tobacco: Never Used   Substance and Sexual Activity     Alcohol use: No     Comment: Alcoholic Drinks/day: 2-3 beer per month     Drug use: No     Sexual activity: Not on file   Other Topics Concern     Not on file   Social History Narrative     Not on file     Social Determinants of Health     Financial Resource Strain: Not on file   Food Insecurity: Not on file   Transportation Needs: Not on file   Physical Activity: Not on file   Stress: Not on file   Social Connections: Not on file   Intimate Partner Violence: Not on file   Housing Stability: Not on file       ROS:  Reviewed with Karen Santiago today.      General  Constitutional  Constitutional (WDL): All constitutional elements are within defined limits  EENT  Eye Disorders  Eye Disorder (WDL): All eye disorder elements are within defined limits  Ear Disorders  Ear Disorder (WDL): All ear disorder elements are within defined limits  Respiratory    Respiratory  Respiratory (WDL): All respiratory elements are within defined limits  Cardiovascular  Cardiovascular  Cardiovascular (WDL): All cardiovascular elements are within defined limits  Gastrointestinal  Gastrointestinal  Gastrointestinal (WDL): All gastrointestinal elements are within defined limits  Musculoskeletal  Musculoskeletal and Connective Tissue Disorders  Musculoskeletal & Connective (WDL): Exceptions to WDL  Arthralgia: Mild pain (chronic back pain, unchanged)  Integumentary              Integumentary  Integumentary (WDL): All integumentary elements are within defined limits  Neurological  Neurosensory  Neurosensory (WDL): All neurosensory elements are within defined limits  Genitourinary/Reproductive  Genitourinary  Genitourinary (WDL): Exceptions to WDL (doesn't have a  "bladder, f/u at [facility] recently got \"all clear\")  Lymphatic   Lymph System Disorders  Lymph (WDL): All lymph elements are within defined limits  Patient Coping  Patient Coping: Accepting;Open/discussion  Pain   Pain Score: No Pain (0)   AUA Assessment                                                                         Accompanied by  Accompanied By: self only    Objective:        Exam:    Vitals:    08/29/22 1140 08/29/22 1148   BP: (!) 150/70    Pulse: 67    Resp: 16    SpO2: 96%    Weight: 84.3 kg (185 lb 12.8 oz)    PainSc: No Pain (0) No Pain (0)     GENERAL: No acute distress. Cooperative in conversation. Mask on.   RESP: Normal respiratory effort.   ABD: Soft, nontender.  NEURO: Non focal. Alert and oriented x3.   PSYCH: Within normal limits. No depression or anxiety.  SKIN: Warm dry intact.     Recent Labs: No results found for this or any previous visit (from the past 168 hour(s)).    Imaging: Imaging results 30 days: CT Chest w/o contrast    Result Date: 8/16/2022  EXAM: CT CHEST W/O CONTRAST LOCATION: [facility] DATE/TIME: 8/16/2022 11:43 AM INDICATION: Non-small cell lung cancer, monitor COMPARISON: 08/12/2021 TECHNIQUE: CT chest without IV contrast. Multiplanar reformats were obtained. Dose reduction techniques were used. CONTRAST: None. FINDINGS: LUNGS AND PLEURA: Fiducial marker in the left midlung with associated architectural distortion and soft tissue. No new soft tissue in this region. A 3 mm right lower lobe nodule has not changed (series 5 image 144). Other small pulmonary nodules have not  changed. Emphysema is present.  There are partially calcified pleural plaques on the right and left. MEDIASTINUM/AXILLAE: No lymphadenopathy. No thoracic aortic aneurysm. CORONARY ARTERY CALCIFICATION: Severe. UPPER ABDOMEN: Circumscribed hypoattenuating hepatic and renal lesions are likely simple cysts. No follow-up required. There is cholelithiasis. A benign right adrenal nodule " does not require follow-up. MUSCULOSKELETAL: Unremarkable.     IMPRESSION: 1.  Posttreatment changes in the left upper lung. No evidence of new malignancy in the thorax. Small pulmonary nodules have not changed. 2.  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           45  minutes spent on the date of the encounter doing chart review, rpersonal review of serial images, correlation with radiation port/images, review of test results, review of [facility] records,  interpretation of tests, patient visit, documentation.       I, Alesia Hammond MD personally performed the services described in this documentation, as scribed by Behzad Plummer in my presence, and it is both accurate and complete.    Signed by: Alesia Hammond MD, MPH

## 2022-08-29 NOTE — LETTER
8/29/2022         RE: Karen Santiago  [address]  [facility] [address]        Dear Colleague,    Thank you for referring your patient, Karen Santiago, to the [facility] RADIATION ONCOLOGY [place]. Please see a copy of my visit note below.            [facility] Radiation Oncology Follow Up Note    Patient: Karen Santiago  MRN: [id number]  Date of Service: 08/29/2022    Assessment:       ICD-10-CM    1. 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 Previous COPD exacerbation, complete resolution with steroids and abx.      1. Reviewed CT chest without contrast on 8/16/2022. No evidence of new or recurrent disease. Discussed results with patients.     2. Follow up in 1 year for repeat CT chest w/o contrast and office visit, sooner if worsening SOB or other concerns.     3. Continue f/u with [facility] as directed for urologic cancers    Subjective:     HPI:  Karen Santiago is a 87 year old male who was treated with SBRT for an 11 mm lung nodule in posterior lingula presumed to be cancer.     The patient has a history of urothelial and bladder carcinoma since 2002. Recurrence in 2012 and he underwent a cystoprostatectomy with ileal conduit urinary diversion on 10/15/2012. No neoadjuvant chemotherapy. He had recurrence in the left distal ureter and underwent a left distal ureterectomy with the excision of the cuff and reanastomosis 1/21/2013. 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 He required a stent which was removed. Currently HANNY from urological standpoint.      June 2014 patient was following up with pulmonology as there was some concern for anterior mediastinal lesion, initially thought to be a small  benign anterior mediastinal cyst. Noted at that time were numerous small and tiny pulmonary nodules which had been stable.      CT chest on 5/4/2018 showed an 11 mm semisolid nodule with 3 mm solid component in the lingula posteriorly which is increased from a 6 x 3 mm ground glass nodule seen previously on 4/11/2017. His other multiple tiny bilateral solid pulmonary nodules remain unchanged     SITE TREATED: DANA  TOTAL DOSE: 5000  NUMBER OF FRACTIONS: 5  DATES COMPLETED: 8/23/2018  CONCURRENT CHEMOTHERAPY: No  ADJUVANT THERAPY:No     He tolerated the treatment without unexpected side effects.      The patient presents for routine follow up. No complaints today.     Past Medical History:   Diagnosis Date     Cancer (H)     bladder     COPD (chronic obstructive pulmonary disease) (H)      Hypertension      Rheumatoid arthritis (H)      Past Surgical History:   Procedure Laterality Date     HC REMOVAL TESTIS,SIMPLE      Description: Orchiectomy Left;  Recorded: 04/26/2010;  Comments: for benign interstitial tumor     HC REPAIR UMBILICAL IRLANDA,<4Y/O,REDUC      Description: Umbilical Hernia Repair;  Recorded: 04/26/2010;     [facility] CYSTECTOMY,ILEAL CONDUIT/SIGMOID BLADDER      Description: Complete Bladder Cystectomy With Ureteroileal Conduit;  Proc Date: 01/01/2002;  Comments: cystoprostatectomy for squamous cell carcinoma per [facility] REMV PROSTATE,RETROPUBIC,SUBTOTAL      Description: Prostatectomy, Retropubic;  Recorded: 05/19/2009;  Comments: 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     Patient has no known allergies.  Social History     Socioeconomic History     Marital status:      Spouse name: Not on file     Number of children: Not on file     Years of education: Not on file     Highest education level: Not on file   Occupational History     Not on file   Tobacco Use     Smoking status: Former Smoker     Types: Cigarettes     Quit date: 1980     Years since quittin.6     Smokeless tobacco: Never Used   Substance and Sexual Activity     Alcohol use: No     Comment: Alcoholic Drinks/day: 2-3 beer per month     Drug use: No     Sexual activity: Not on file   Other Topics Concern     Not on file   Social History Narrative     Not on file     Social Determinants of Health     Financial Resource Strain: Not on file   Food Insecurity: Not on file   Transportation Needs: Not on file   Physical Activity: Not on file   Stress: Not on file   Social Connections: Not on file   Intimate Partner Violence: Not on file   Housing Stability: Not on file       ROS:  Reviewed with Karen Santiago today.      General  Constitutional  Constitutional (WDL): All constitutional elements are within defined limits  EENT  Eye Disorders  Eye Disorder (WDL): All eye disorder elements are within defined limits  Ear Disorders  Ear Disorder (WDL): All ear disorder elements are within defined limits  Respiratory    Respiratory  Respiratory (WDL): All respiratory elements are within defined limits  Cardiovascular  Cardiovascular  Cardiovascular (WDL): All cardiovascular elements are within defined limits  Gastrointestinal  Gastrointestinal  Gastrointestinal (WDL): All gastrointestinal elements are within defined limits  Musculoskeletal  Musculoskeletal and Connective Tissue Disorders  Musculoskeletal & Connective (WDL): Exceptions to WDL  Arthralgia: Mild pain (chronic back pain, unchanged)  Integumentary               "Integumentary  Integumentary (WDL): All integumentary elements are within defined limits  Neurological  Neurosensory  Neurosensory (WDL): All neurosensory elements are within defined limits  Genitourinary/Reproductive  Genitourinary  Genitourinary (WDL): Exceptions to WDL (doesn't have a bladder, f/u at [facility] recently got \"all clear\")  Lymphatic   Lymph System Disorders  Lymph (WDL): All lymph elements are within defined limits  Patient Coping  Patient Coping: Accepting;Open/discussion  Pain   Pain Score: No Pain (0)   AUA Assessment                                                                         Accompanied by  Accompanied By: self only    Objective:        Exam:    Vitals:    08/29/22 1140 08/29/22 1148   BP: (!) 150/70    Pulse: 67    Resp: 16    SpO2: 96%    Weight: 84.3 kg (185 lb 12.8 oz)    PainSc: No Pain (0) No Pain (0)     GENERAL: No acute distress. Cooperative in conversation. Mask on.   RESP: Normal respiratory effort.   ABD: Soft, nontender.  NEURO: Non focal. Alert and oriented x3.   PSYCH: Within normal limits. No depression or anxiety.  SKIN: Warm dry intact.     Recent Labs: No results found for this or any previous visit (from the past 168 hour(s)).    Imaging: Imaging results 30 days: CT Chest w/o contrast    Result Date: 8/16/2022  EXAM: CT CHEST W/O CONTRAST LOCATION: [facility] DATE/TIME: 8/16/2022 11:43 AM INDICATION: Non-small cell lung cancer, monitor COMPARISON: 08/12/2021 TECHNIQUE: CT chest without IV contrast. Multiplanar reformats were obtained. Dose reduction techniques were used. CONTRAST: None. FINDINGS: LUNGS AND PLEURA: Fiducial marker in the left midlung with associated architectural distortion and soft tissue. No new soft tissue in this region. A 3 mm right lower lobe nodule has not changed (series 5 image 144). Other small pulmonary nodules have not  changed. Emphysema is present.  There are partially calcified pleural plaques on the right and " "left. MEDIASTINUM/AXILLAE: No lymphadenopathy. No thoracic aortic aneurysm. CORONARY ARTERY CALCIFICATION: Severe. UPPER ABDOMEN: Circumscribed hypoattenuating hepatic and renal lesions are likely simple cysts. No follow-up required. There is cholelithiasis. A benign right adrenal nodule does not require follow-up. MUSCULOSKELETAL: Unremarkable.     IMPRESSION: 1.  Posttreatment changes in the left upper lung. No evidence of new malignancy in the thorax. Small pulmonary nodules have not changed. 2.  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           45  minutes spent on the date of the encounter doing chart review, rpersonal review of serial images, correlation with radiation port/images, review of test results, interpretation of tests, patient visit, documentation.       I, Alesia Hammond MD personally performed the services described in this documentation, as scribed by Behzad Plummer in my presence, and it is both accurate and complete.    Signed by: Alesia Hammond MD, MPH      Oncology Rooming Note    August 29, 2022 11:50 AM   Karen Santiago is a 87 year old male who presents for:    Chief Complaint   Patient presents with     Oncology Clinic Visit     Follow up with Rad Onc     Initial Vitals: BP (!) 150/70   Pulse 67   Resp 16   Wt 84.3 kg (185 lb 12.8 oz)   SpO2 96%   BMI 25.91 kg/m   Estimated body mass index is 25.91 kg/m  as calculated from the following:    Height as of 6/23/21: 1.803 m (5' 11\").    Weight as of this encounter: 84.3 kg (185 lb 12.8 oz). Body surface area is 2.05 meters squared.  No Pain (0) Comment: Data Unavailable   No LMP for male patient.  Allergies reviewed: Yes  Medications reviewed: Yes    Medications: Medication refills not needed today.  Pharmacy name entered into [employer]: [id number] [address]    Clinical concerns: None, feeling well.  Dr. Hammond was notified.      Tamica Perdomo, " RN                  Again, thank you for allowing me to participate in the care of your patient.        Sincerely,        Alesia Hammond MD

## 2022-08-29 NOTE — PROGRESS NOTES
"Oncology Rooming Note    August 29, 2022 11:50 AM   Karen Santiago is a 87 year old male who presents for:    Chief Complaint   Patient presents with     Oncology Clinic Visit     Follow up with Rad Onc     Initial Vitals: BP (!) 150/70   Pulse 67   Resp 16   Wt 84.3 kg (185 lb 12.8 oz)   SpO2 96%   BMI 25.91 kg/m   Estimated body mass index is 25.91 kg/m  as calculated from the following:    Height as of 6/23/21: 1.803 m (5' 11\").    Weight as of this encounter: 84.3 kg (185 lb 12.8 oz). Body surface area is 2.05 meters squared.  No Pain (0) Comment: Data Unavailable   No LMP for male patient.  Allergies reviewed: Yes  Medications reviewed: Yes    Medications: Medication refills not needed today.  Pharmacy name entered into [employer]: [id number] [address]    Clinical concerns: None, feeling well.  Dr. Hammond was notified.      Tamica Perdomo RN              "

## 2022-09-26 PROBLEM — M81.0 OSTEOPOROSIS: Status: ACTIVE | Noted: 2022-09-26

## 2022-09-26 PROBLEM — N40.0 BENIGN LOCALIZED HYPERPLASIA OF PROSTATE: Status: ACTIVE | Noted: 2022-09-26

## 2022-09-26 PROBLEM — N13.1 HYDRONEPHROSIS CONCURRENT WITH AND DUE TO URETERAL STRICTURE: Status: ACTIVE | Noted: 2021-06-02

## 2022-09-26 PROBLEM — R91.8 MULTIPLE PULMONARY NODULES: Status: ACTIVE | Noted: 2018-06-01

## 2022-09-27 ENCOUNTER — OFFICE VISIT (OUTPATIENT)
Dept: FAMILY MEDICINE | Facility: CLINIC | Age: 87
End: 2022-09-27
Payer: COMMERCIAL

## 2022-09-27 VITALS
WEIGHT: 184.7 LBS | HEART RATE: 63 BPM | BODY MASS INDEX: 25.76 KG/M2 | OXYGEN SATURATION: 97 % | DIASTOLIC BLOOD PRESSURE: 60 MMHG | SYSTOLIC BLOOD PRESSURE: 120 MMHG

## 2022-09-27 DIAGNOSIS — J43.9 PULMONARY EMPHYSEMA, UNSPECIFIED EMPHYSEMA TYPE (H): Primary | ICD-10-CM

## 2022-09-27 DIAGNOSIS — I10 ESSENTIAL HYPERTENSION, BENIGN: ICD-10-CM

## 2022-09-27 DIAGNOSIS — C66.1 MALIGNANT NEOPLASM OF RIGHT URETER (H): ICD-10-CM

## 2022-09-27 DIAGNOSIS — C67.9 MALIGNANT NEOPLASM OF URINARY BLADDER, UNSPECIFIED SITE (H): ICD-10-CM

## 2022-09-27 DIAGNOSIS — N18.31 CHRONIC KIDNEY DISEASE, STAGE 3A (H): ICD-10-CM

## 2022-09-27 DIAGNOSIS — D64.9 ANEMIA, UNSPECIFIED TYPE: ICD-10-CM

## 2022-09-27 DIAGNOSIS — C34.12 MALIGNANT NEOPLASM OF UPPER LOBE OF LEFT LUNG (H): ICD-10-CM

## 2022-09-27 DIAGNOSIS — Z93.6 S/P ILEAL CONDUIT (H): ICD-10-CM

## 2022-09-27 DIAGNOSIS — D50.9 IRON DEFICIENCY ANEMIA, UNSPECIFIED IRON DEFICIENCY ANEMIA TYPE: ICD-10-CM

## 2022-09-27 LAB
ANION GAP SERPL CALCULATED.3IONS-SCNC: 11 MMOL/L (ref 7–15)
BUN SERPL-MCNC: 23.5 MG/DL (ref 8–23)
CALCIUM SERPL-MCNC: 9 MG/DL (ref 8.8–10.2)
CHLORIDE SERPL-SCNC: 103 MMOL/L (ref 98–107)
CREAT SERPL-MCNC: 0.91 MG/DL (ref 0.67–1.17)
DEPRECATED HCO3 PLAS-SCNC: 24 MMOL/L (ref 22–29)
ERYTHROCYTE [DISTWIDTH] IN BLOOD BY AUTOMATED COUNT: 13.6 % (ref 10–15)
GFR SERPL CREATININE-BSD FRML MDRD: 82 ML/MIN/1.73M2
GLUCOSE SERPL-MCNC: 97 MG/DL (ref 70–99)
HCT VFR BLD AUTO: 40 % (ref 40–53)
HGB BLD-MCNC: 12.9 G/DL (ref 13.3–17.7)
IRON BINDING CAPACITY (ROCHE): 278 UG/DL (ref 240–430)
IRON SATN MFR SERPL: 24 % (ref 15–46)
IRON SERPL-MCNC: 67 UG/DL (ref 61–157)
MCH RBC QN AUTO: 30.8 PG (ref 26.5–33)
MCHC RBC AUTO-ENTMCNC: 32.3 G/DL (ref 31.5–36.5)
MCV RBC AUTO: 96 FL (ref 78–100)
PLATELET # BLD AUTO: 183 10E3/UL (ref 150–450)
POTASSIUM SERPL-SCNC: 4.2 MMOL/L (ref 3.4–5.3)
RBC # BLD AUTO: 4.19 10E6/UL (ref 4.4–5.9)
SODIUM SERPL-SCNC: 138 MMOL/L (ref 136–145)
WBC # BLD AUTO: 5.2 10E3/UL (ref 4–11)

## 2022-09-27 PROCEDURE — 90662 IIV NO PRSV INCREASED AG IM: CPT | Performed by: PHYSICIAN ASSISTANT

## 2022-09-27 PROCEDURE — G0008 ADMIN INFLUENZA VIRUS VAC: HCPCS | Performed by: PHYSICIAN ASSISTANT

## 2022-09-27 PROCEDURE — 83550 IRON BINDING TEST: CPT | Performed by: PHYSICIAN ASSISTANT

## 2022-09-27 PROCEDURE — 99214 OFFICE O/P EST MOD 30 MIN: CPT | Mod: 25 | Performed by: PHYSICIAN ASSISTANT

## 2022-09-27 PROCEDURE — 36415 COLL VENOUS BLD VENIPUNCTURE: CPT | Performed by: PHYSICIAN ASSISTANT

## 2022-09-27 PROCEDURE — 80048 BASIC METABOLIC PNL TOTAL CA: CPT | Performed by: PHYSICIAN ASSISTANT

## 2022-09-27 PROCEDURE — 83540 ASSAY OF IRON: CPT | Performed by: PHYSICIAN ASSISTANT

## 2022-09-27 PROCEDURE — 85027 COMPLETE CBC AUTOMATED: CPT | Performed by: PHYSICIAN ASSISTANT

## 2022-09-27 ASSESSMENT — ENCOUNTER SYMPTOMS
DIARRHEA: 0
WEAKNESS: 0
CHILLS: 0
PALPITATIONS: 0
SHORTNESS OF BREATH: 1
NAUSEA: 0
HEARTBURN: 0
EYE PAIN: 0
DIZZINESS: 0
NERVOUS/ANXIOUS: 0
ARTHRALGIAS: 0
HEMATOCHEZIA: 0
PARESTHESIAS: 0
HEADACHES: 0
HEMATURIA: 0
DYSURIA: 0
CONSTIPATION: 0
SORE THROAT: 0
FEVER: 0
JOINT SWELLING: 0
MYALGIAS: 0
COUGH: 1
FREQUENCY: 0
ABDOMINAL PAIN: 0

## 2022-09-27 ASSESSMENT — PAIN SCALES - GENERAL: PAINLEVEL: NO PAIN (0)

## 2022-09-27 NOTE — PROGRESS NOTES
Assessment & Plan       ICD-10-CM    1. Pulmonary emphysema, unspecified emphysema type (H)  J43.9    2. Benign Essential Hypertension  I10    3. 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 Malignant neoplasm of urinary bladder, unspecified site (H)  C67.9    6. S/P ileal conduit (H)  Z93.6    7. Iron deficiency anemia, unspecified iron deficiency anemia type  D50.9 Iron and iron binding capacity     Iron and iron binding capacity   8. Chronic kidney disease, stage 3a (H)  N18.31 Basic metabolic panel     Basic metabolic panel   9. Anemia, unspecified type  D64.9      #1 COPD-symptoms are well controlled at this time.  Currently on albuterol and Symbicort.  Continue these medications at this time    #2 hypertension-blood pressure is well controlled at 120/60.  Continue amlodipine and losartan.    #3 iron deficiency anemia-last CBC on 3/2022 showed hemoglobin 11.8 which is stable for him.  Iron was 82.  I did recommend starting a daily multivitamin with iron in it.  We will check a CBC and an iron today.    #4 chronic kidney disease-last BMP 3/2022 showed a GFR 84 with a creatinine of 0.87.  We will check a BMP today.  We will avoid nephrotoxic medication.  Blood pressure is well controlled today    #5 urethral/bladder carcinoma status post ileal conduct - stable at this time.  Recently diagnosed in 2002 with recurrence in 2012.  He is status post cystoprectomt with ileal conduit . recently seen by urology 7/2022 for repeat CT.  CT did show ureter thickening at the right distal ureter but cytology was negative.  He has not noted any hematuria or any other new symptoms.  He is to continue to follow-up with urology.    #6 lung cancer-he was diagnosed with lung cancer 8/18.  Underwent radiation.  He did see oncology 8/2020 2 repeat CT at that time did not show any recurrence.  He will follow-up in 1 year's time.    #7 colon cancer screening-she  does not wish to do any further colonoscopies.  [id number]}         No follow-ups on file.   Follow-up Visit   Expected date:  Mar 27, 2023 (Approximate)      Follow Up Appointment Details:     Follow-up with whom?: Me    Follow-Up for what?: Medicare Wellness    Welcome or Annual?: Annual Wellness    How?: In Person    Is this an as-needed follow-up?: No                    JOSÉ ANTONIO [facility]   Karen is a 87 year old, presenting for the following health issues:  Health Maintenance (6 month follow up. [facility]. )      Karen is a pleasant 87-year-old male who presents to the clinic today to establish care 6-month follow-up.  He has a past medical history of COPD, bladder cancer status post ileal conduct, left upper lung cancer status post radiation, hypertension, BPH, osteoporosis, and iron deficiency anemia.  He is doing well today no questions or concerns regarding his health    He was diagnosed with ureter/bladder cancer in 2002 with recurrence in 2012.  He underwent a cystoproctostomy  with ileal conduit on 10/2012.  His last urology follow-up with July 2022.  He did undergo a CT urogram which showed ureter thickening at the right distal ureter-cytology was negative so they plan to repeat CT in 3 months.  He has a past medical history of left lung nodule treated with SBRT for 11 mm NSCLC on 8/18.  He did do radiation x5 treatments.  Last oncology appointment was 8/2022.  Repeat CT 8/16/2022 did not show any recurrence.  They plan to do a CT in 1 years time.    He has underlying COPD.  He states his breathing is stable.  He is on albuterol as needed and Symbicort.    Hypertensionblood pressure is 120/60.  Currently on amlodipine and losartan.    BPH symptoms are well controlled at this time.    He does have underlying iron deficiency anemia.  Last CBC showed hemoglobin of 11.8 with an iron of 82 3/2022.  He was recommended to start on iron supplements but  due to constipation he discontinued this.      History of Present Illness       Reason for visit:  Followup    He eats 0-1 servings of fruits and vegetables daily.He consumes 0 sweetened beverage(s) daily. He exercises with enough effort to increase his heart rate 4 days per week.   He is taking medications regularly.         Review of Systems   Constitutional: Negative for chills and fever.   HENT: Negative for congestion, ear pain, hearing loss and sore throat.    Eyes: Negative for pain and visual disturbance.   Respiratory: Positive for cough (baseline) and shortness of breath (baseling).    Cardiovascular: Negative for chest pain, palpitations and peripheral edema.   Gastrointestinal: Negative for abdominal pain, constipation, diarrhea, heartburn, hematochezia and nausea.   Genitourinary: Negative for dysuria, frequency, genital sores, hematuria, impotence, penile discharge and urgency.   Musculoskeletal: Negative for arthralgias, joint swelling and myalgias.   Skin: Negative for rash.   Neurological: Negative for dizziness, weakness, headaches and paresthesias.   Psychiatric/Behavioral: Negative for mood changes. The patient is not nervous/anxious.             Objective    /60   Pulse 63   Wt 83.8 kg (184 lb 11.2 oz)   SpO2 97%   BMI 25.76 kg/m    Body mass index is 25.76 kg/m .  Physical Exam  Vitals and nursing note reviewed.   Constitutional:       Appearance: Normal appearance.   HENT:      Head: Normocephalic and atraumatic.   Eyes:      Conjunctiva/sclera: Conjunctivae normal.   Cardiovascular:      Rate and Rhythm: Normal rate and regular rhythm.      Heart sounds: No murmur heard.    No friction rub. No gallop.   Pulmonary:      Effort: Pulmonary effort is normal.      Breath sounds: Examination of the right-lower field reveals decreased breath sounds. Examination of the left-lower field reveals decreased breath sounds. Decreased breath sounds present. No wheezing, rhonchi or rales.    Musculoskeletal:      Cervical back: Neck supple.      Right lower leg: No edema.      Left lower leg: No edema.   Skin:     General: Skin is warm and dry.   Neurological:      General: No focal deficit present.      Mental Status: He is alert and oriented to person, place, and time.      GCS: GCS eye subscore is 4. GCS verbal subscore is 5. GCS motor subscore is 6.      Motor: No weakness.      Gait: Gait is intact.

## 2022-09-28 ENCOUNTER — TELEPHONE (OUTPATIENT)
Dept: FAMILY MEDICINE | Facility: CLINIC | Age: 87
End: 2022-09-28

## 2022-09-28 NOTE — TELEPHONE ENCOUNTER
----- Message from Howard Luu PA-C sent at 9/28/2022  3:48 AM CDT -----  Please call pt and let him know that his hgb is stable at 12.9 but down a little from 13.6. Iron is also lower as well. He should start on a daily multivitamin that had iron in it to help with this.    Kidney function is stable.

## 2022-09-28 NOTE — TELEPHONE ENCOUNTER
Left voicemail for patient to return call to clinic.    Please relay results below from provider.    Tamica Renteria RN on 9/28/2022 at 11:25 AM

## 2022-10-05 ENCOUNTER — TELEPHONE (OUTPATIENT)
Dept: FAMILY MEDICINE | Facility: CLINIC | Age: 87
End: 2022-10-05

## 2022-10-05 ENCOUNTER — OFFICE VISIT (OUTPATIENT)
Dept: FAMILY MEDICINE | Facility: CLINIC | Age: 87
End: 2022-10-05
Payer: COMMERCIAL

## 2022-10-05 VITALS
WEIGHT: 183 LBS | HEART RATE: 70 BPM | DIASTOLIC BLOOD PRESSURE: 69 MMHG | TEMPERATURE: 97.7 F | SYSTOLIC BLOOD PRESSURE: 153 MMHG | BODY MASS INDEX: 25.52 KG/M2 | OXYGEN SATURATION: 97 % | RESPIRATION RATE: 18 BRPM

## 2022-10-05 DIAGNOSIS — J44.1 COPD EXACERBATION (H): Primary | ICD-10-CM

## 2022-10-05 DIAGNOSIS — R05.1 ACUTE COUGH: ICD-10-CM

## 2022-10-05 PROCEDURE — U0005 INFEC AGEN DETEC AMPLI PROBE: HCPCS | Performed by: FAMILY MEDICINE

## 2022-10-05 PROCEDURE — U0003 INFECTIOUS AGENT DETECTION BY NUCLEIC ACID (DNA OR RNA); SEVERE ACUTE RESPIRATORY SYNDROME CORONAVIRUS 2 (SARS-COV-2) (CORONAVIRUS DISEASE [COVID-19]), AMPLIFIED PROBE TECHNIQUE, MAKING USE OF HIGH THROUGHPUT TECHNOLOGIES AS DESCRIBED BY CMS-2020-01-R: HCPCS | Performed by: FAMILY MEDICINE

## 2022-10-05 PROCEDURE — 99213 OFFICE O/P EST LOW 20 MIN: CPT | Mod: CS | Performed by: FAMILY MEDICINE

## 2022-10-05 RX ORDER — DOXYCYCLINE 100 MG/1
100 CAPSULE ORAL 2 TIMES DAILY
Qty: 20 CAPSULE | Refills: 0 | Status: SHIPPED | OUTPATIENT
Start: 2022-10-05 | End: 2022-10-15

## 2022-10-05 RX ORDER — PREDNISONE 20 MG/1
TABLET ORAL
Qty: 10 TABLET | Refills: 0 | Status: SHIPPED | OUTPATIENT
Start: 2022-10-05 | End: 2023-03-18

## 2022-10-05 NOTE — TELEPHONE ENCOUNTER
Patient called for an appointment. No appointment until next week.    He has had a cough and spitting up mucous for 5 days. His old provider would give him a steroid over the phone, and he wants this. He denies breathing issues,shortness of breath, chest pain or pressure, fever, or ear pain. He does have a runny nose. Its hard for him to sleep with his cough. Advised he go to walk in care. Address given to patient, and he will go.  Tamica Renteria RN on 10/5/2022 at 10:42 AM

## 2022-10-05 NOTE — PATIENT INSTRUCTIONS
Fluids, rest, steam may be helpful to loosen mucus.     Cough drops    Prednisone 40mg (2 tabs) daily for 3-5 days - OR - take 20mg (1 tab) daily for 5-10 days if that is working for you.   Or you can take 40mg today then cut down to the 20mg    Doxycycline twice a day for 10 days    Recheck if worse or no better.     Covid is pending.

## 2022-10-06 ENCOUNTER — TELEPHONE (OUTPATIENT)
Dept: NURSING | Facility: CLINIC | Age: 87
End: 2022-10-06

## 2022-10-06 LAB — SARS-COV-2 RNA RESP QL NAA+PROBE: POSITIVE

## 2022-10-06 NOTE — TELEPHONE ENCOUNTER
Coronavirus (COVID-19) Notification    Caller Name (Patient, parent, daughter/son, grandparent, etc)  Patient     Reason for call  Notify of Positive Coronavirus (COVID-19) lab results, assess symptoms,  review [facility] recommendations    Lab Result    Lab test:  2019-nCoV rRt-PCR or SARS-CoV-2 PCR    Oropharyngeal AND/OR nasopharyngeal swabs is POSITIVE for 2019-nCoV RNA/SARS-COV-2 PCR (COVID-19 virus)      Gather patient reported symptoms   Assessment   Current Symptoms at time of phone call, reported by patient: (if no symptoms, document: No symptoms] Patient feels good    Date of symptom(s) onset (if applicable)       If at time of call, Patients symptoms have worsened, the Patient should contact 911 or have someone drive them to Emergency Dept promptly:      If Patient calling 911, inform 911 personal that you have tested positive for the Coronavirus (COVID-19).  Place mask on and await 911 to arrive.    If Emergency Dept, If possible, please have another adult drive you to the Emergency Dept but you need to wear mask when in contact with other people.      Treatment Options:   Patient classified as COVID treatment eligible by Epic high risk algorithm: Yes  Is the patient symptomatic at the time of result notification? No    Review information with Patient    Your result was positive. This means you have COVID-19 (coronavirus).    How can I protect others?    These guidelines are for isolating before returning to work, school or .    If you DO have symptoms    Stay home and away from others     For at least 5 days after your symptoms started, AND    You are fever free for 24 hours (with no medicine that reduces fever), AND    Your other symptoms are better    Wear a mask for 10 full days anytime you are around others    If you DON'T have symptoms    Stay home and away from others for at least 5 days after your positive test    Wear a mask for 10 full days anytime you are around others    There  may be different guidelines for healthcare facilities.  Please check with the specific sites before arriving.    If you have been told by a doctor that you were severely ill with COVID-19 or are immunocompromised, you should isolate for at least 10 days.    You should not go back to work until you meet the guidelines above for ending your home isolation. You don't need to be retested for COVID-19 before going back to work--studies show that you won't spread the virus if it's been at least 10 days since your symptoms started (or 20 days, if you have a weak immune system).    Employers, schools, and daycares: This is an official notice for this person's medical guidelines for returning in-person.  They must meet the above guidelines before going back to work, school or  in person.    You will receive a positive COVID-19 letter via [employer] or the mail soon with additional self-care information.    Would you like me to review some of that information with you now?  Yes    How can I take care of myself?      Get lots of rest. Drink extra fluids (unless a doctor has told you not to).      Take Tylenol (acetaminophen) for fever or pain. If you have liver or kidney problems, ask your family doctor if it's okay to take Tylenol.     Take either:     650 mg (two 325 mg pills) every 4 to 6 hours, or     1,000 mg (two 500 mg pills) every 8 hours as needed.     Note: Do not take more than 3,000 mg in one day. Acetaminophen is found in many medicines (both prescribed and over-the-counter medicines). Read all labels to be sure you don't take too much.    For children, check the Tylenol bottle for the right dose (based on their age or weight).      If you have other health problems (like cancer, heart failure, an organ transplant or severe kidney disease): Call your specialty clinic if you don't feel better in the next 2 days.      Know when to call 911: Emergency warning signs include:    Trouble breathing or shortness of  breath    Pain or pressure in the chest that doesn't go away    Feeling confused like you haven't felt before, or not being able to wake up    Bluish-colored lips or face        If you were tested for an upcoming procedure, please contact your provider for next steps.    Candace Bruno

## 2022-10-06 NOTE — PROGRESS NOTES
Assessment/Plan:   COPD exacerbation (H)  Acute cough  Acute cough and mild nasal congestion x 5 days. Covid test is pending.   Consider viral or allergic trigger, COPD exacerbation.   Due to high risk patient will start doxycycline and prednisone as he has used in the past.   Review of chart shows benefit from prednisone - actual dose had discrepancies. He may have taken the last prescription 20mg daily for 6 days instead of 40mg daily for 3 as prescribed but he cannot remember.  We discussed that the lowest dose that is helpful and for the shortest duration needed is best so he will titrate the dose to his needs.   Follow up if not improving.   If Covid positive, he is likely outside the window for Paxlovid given duration of illness.   - predniSONE (DELTASONE) 20 MG tablet; 40mg daily for 5 days then 20mg daily for 5 days  Dispense: 10 tablet; Refill: 0  - doxycycline hyclate (VIBRAMYCIN) 100 MG capsule; Take 1 capsule (100 mg) by mouth 2 times daily for 10 days  Dispense: 20 capsule; Refill: 0  - Symptomatic; Unknown COVID-19 Virus (Coronavirus) by PCR Nose    I discussed red flag symptoms, return precautions to clinic/ER and follow up care with patient/parent.  Expected clinical course, symptomatic cares advised. Questions answered. Patient/parent amenable with plan.    Fluids, rest, steam may be helpful to loosen mucus.     Cough drops    Prednisone 40mg (2 tabs) daily for 3-5 days - OR - take 20mg (1 tab) daily for 5-10 days if that is working for you.   Or you can take 40mg today then cut down to the 20mg    Doxycycline twice a day for 10 days    Recheck if worse or no better.     Covid is pending.     Subjective:     Karen Santiago is a 87 year old male with history of COPD and hypertension who presents for evaluation of cough and congestion. Started about 5 days ago with vigorous productive cough. Typical of what he experiences yearly and is usually treated by his primary doc with prednisone and  doxycycline. Unable to get in to see primary in clinic this week.   Cough productive at times, dry at times. Very harsh. Hurts under sternum with coughing.   Mild nasal congestion/drainage - thought it was weather or allergies.    No fever. No chills. No N/V/D.   No leg swelling or calf pain, no rash.   No exertional chest pain or palpitations. No malaise or myalgia.  Denies chest tightness or wheeze. Doesn't feel like he needs inhaler.   Uses symbicort daily.   No known ill contacts however he was in the clinic 9/27/22, a week ago, for a routine visit and may have picked up an illness.   Had 4th Covid booster 2 weeks ago, felt fine after.    No Known Allergies     Current Outpatient Medications   Medication     acetaminophen 500 MG CAPS     albuterol (PROAIR HFA/PROVENTIL HFA/VENTOLIN HFA) 108 (90 Base) MCG/ACT inhaler     amLODIPine (NORVASC) 10 MG tablet     doxycycline hyclate (VIBRAMYCIN) 100 MG capsule     losartan (COZAAR) 100 MG tablet     predniSONE (DELTASONE) 20 MG tablet     SYMBICORT 160-4.5 MCG/ACT Inhaler     vitamin B complex with vitamin C (STRESS TAB) tablet     vitamin B-12 (CYANOCOBALAMIN) 100 MCG tablet     No current facility-administered medications for this visit.     Patient Active Problem List   Diagnosis     Primary Osteoarthritis Of The Lumbar Vertebrae     Obesity     B12 deficiency     Benign Essential Hypertension     Squamous Cell Carcinoma Of The Skin     S/P ileal conduit (H)     COPD (chronic obstructive pulmonary disease) (H)     (Presumed) malignant neoplasm of upper lobe of left lung (H)     Malignant neoplasm of bladder (H)     Malignant neoplasm of ureter (H)     Other specified disorders of kidney and ureter     Benign localized hyperplasia of prostate     Hydronephrosis concurrent with and due to ureteral stricture     Multiple pulmonary nodules     Osteoporosis     Iron deficiency anemia, unspecified iron deficiency anemia type     Chronic kidney disease, stage 3a (H)      Anemia, unspecified type       Objective:     BP (!) 153/69   Pulse 70   Temp 97.7  F (36.5  C)   Resp 18   Wt 83 kg (183 lb)   SpO2 97%   BMI 25.52 kg/m      Physical    General Appearance: Alert, pleasant, no distress, AVSS  Head: Normocephalic, without obvious abnormality, atraumatic  Eyes: Conjunctivae are normal.   Ears: Normal TMs and external ear canals, both ears  Nose: mild congestion.  Throat: Throat is normal.  No exudate.  No vesicular lesions  Neck: No adenopathy  Lungs: Clear to auscultation bilaterally, but decreased breath sounds throughout, respirations unlabored. Vigorous cough  Heart: Regular rate and rhythm  Extremities: No lower extremity edema  Skin: no rashes or lesions  Psychiatric: Patient has a normal mood and affect.       This note has been dictated in part using voice recognition software.  Any grammatical or context distortions are unintentional and inherent to the software.  Please feel free to contact me directly for clarification if needed.

## 2022-11-07 ENCOUNTER — TRANSFERRED RECORDS (OUTPATIENT)
Dept: HEALTH INFORMATION MANAGEMENT | Facility: CLINIC | Age: 87
End: 2022-11-07

## 2022-11-07 ENCOUNTER — TELEPHONE (OUTPATIENT)
Dept: FAMILY MEDICINE | Facility: CLINIC | Age: 87
End: 2022-11-07

## 2022-11-07 NOTE — TELEPHONE ENCOUNTER
Chief Complaint   Patient presents with     Knee Pain   Patient called in with complaints of knee pain since last Friday. Sudden pain.  No swelling. No redness or warmth. Patient wants to be seen. No appointments today. Recommended [facility].  Patient will go there and have his daughter drive him.  Tamica Renteria RN on 11/7/2022 at 8:15 AM

## 2022-12-06 ENCOUNTER — TRANSFERRED RECORDS (OUTPATIENT)
Dept: HEALTH INFORMATION MANAGEMENT | Facility: CLINIC | Age: 87
End: 2022-12-06

## 2023-03-18 ENCOUNTER — OFFICE VISIT (OUTPATIENT)
Dept: FAMILY MEDICINE | Facility: CLINIC | Age: 88
End: 2023-03-18
Payer: COMMERCIAL

## 2023-03-18 VITALS
BODY MASS INDEX: 25.1 KG/M2 | DIASTOLIC BLOOD PRESSURE: 67 MMHG | OXYGEN SATURATION: 95 % | HEART RATE: 71 BPM | WEIGHT: 180 LBS | TEMPERATURE: 98 F | RESPIRATION RATE: 19 BRPM | SYSTOLIC BLOOD PRESSURE: 123 MMHG

## 2023-03-18 DIAGNOSIS — J44.1 COPD EXACERBATION (H): Primary | ICD-10-CM

## 2023-03-18 PROCEDURE — 99214 OFFICE O/P EST MOD 30 MIN: CPT | Performed by: NURSE PRACTITIONER

## 2023-03-18 RX ORDER — AZITHROMYCIN 250 MG/1
TABLET, FILM COATED ORAL
Qty: 6 TABLET | Refills: 0 | Status: SHIPPED | OUTPATIENT
Start: 2023-03-18 | End: 2023-03-23

## 2023-03-18 ASSESSMENT — ENCOUNTER SYMPTOMS
CHILLS: 0
WHEEZING: 0
CHEST TIGHTNESS: 0
FEVER: 0
SORE THROAT: 0
COUGH: 1

## 2023-03-18 NOTE — PATIENT INSTRUCTIONS
Use Dayquil    Try scheduling the albuterol - 3-4 times per day for a few days.    Start azithromycin today    Come back ASAP with fevers, worsening shortness of breath.

## 2023-03-18 NOTE — PROGRESS NOTES
"Assessment & Plan     COPD exacerbation (H)    - azithromycin (ZITHROMAX) 250 MG tablet  Dispense: 6 tablet; Refill: 0       Focused exam and history done due to COVID-19 pandemic in a walk-in setting.      History, exam, and vital signs consistent with a viral URI versus bacterial COPD exacerbation.  Worsening cough, increased sputum production, mostly clear.  Symptoms have been present for almost 10 days.  Is having a lot of clear and maybe yellow sputum.  Patient wants antibiotic.  Explained he could also instead of taking antibiotic monitor the sputum appearance and come back if it is more yellow or green or not improving over the next few days.  Would prefer the antibiotic.    Continue DayQuil.  Recommend scheduling albuterol inhalers while ill 3-4 times daily.     Lungs have coarse wheezing throughout.  No fevers.  No significant visible shortness of breath today.    Recheck if shortness of breath worsens or new fevers develop.  Rest.     Follow-up as scheduled in about 1 week.    OTCs recommended: None [   ].  Dextromethorphan  [ x ], guaifenesin [x  ], pseudoephedrine [   ], Afrin for no greater than 3 days [  ], Tylenol or ibuprofen [  ].                Return in about 5 days (around 3/23/2023) for If no better.    Lazara Prabhakar, [facility]    Samara Jones is a 88 year old male who presents to clinic today for the following health issues:  Chief Complaint   Patient presents with     Cough     Nasal Congestion     For a week  COPD patient      HPI    Hx COPD.  Coughing up clear mucus.  Maybe a little yellow if it's a \"big chunk.\"      Has been going on x about 9 days. Cough comes and goes.  \"I'll be okay for 5-6 hours and then it'll come back.\"       No fevers.      No ill contacts.      Feeling more short of breath.      Using symbicort.  Last this morning and albuterol just prior to departure as needed.  Thinks it helped.      Taking dayquil this morning x 1 " week.      Has appt in about 9 days           Review of Systems   Constitutional: Negative for chills and fever.   HENT: Negative for ear pain and sore throat.    Respiratory: Positive for cough. Negative for chest tightness and wheezing.    Cardiovascular: Negative for chest pain.           Objective    /67   Pulse 71   Temp 98  F (36.7  C)   Resp 19   Wt 81.6 kg (180 lb)   SpO2 95%   BMI 25.10 kg/m    Physical Exam  Constitutional:       Appearance: Normal appearance.   HENT:      Nose: No congestion.      Right Sinus: No maxillary sinus tenderness or frontal sinus tenderness.      Left Sinus: No maxillary sinus tenderness or frontal sinus tenderness.   Eyes:      Conjunctiva/sclera: Conjunctivae normal.   Pulmonary:      Effort: Pulmonary effort is normal.      Breath sounds: Normal breath sounds.      Comments: Coarse wheezing/crackles throughout  Wet cough noted during exam.  Musculoskeletal:         General: Normal range of motion.   Skin:     General: Skin is warm and dry.   Neurological:      Mental Status: He is alert and oriented to person, place, and time.      Gait: Gait normal.   Psychiatric:         Mood and Affect: Mood normal.

## 2023-03-20 DIAGNOSIS — I10 ESSENTIAL HYPERTENSION, BENIGN: ICD-10-CM

## 2023-03-20 RX ORDER — AMLODIPINE BESYLATE 10 MG/1
10 TABLET ORAL DAILY
Qty: 90 TABLET | Refills: 1 | Status: SHIPPED | OUTPATIENT
Start: 2023-03-20 | End: 2023-03-27

## 2023-03-20 RX ORDER — LOSARTAN POTASSIUM 100 MG/1
100 TABLET ORAL DAILY
Qty: 90 TABLET | Refills: 2 | Status: SHIPPED | OUTPATIENT
Start: 2023-03-20 | End: 2023-03-27

## 2023-03-20 NOTE — TELEPHONE ENCOUNTER
"Routing refill request to provider for review/approval because:  Drug not on the [facility] refill protocol     Last Written Prescription Date:  3/30/22  Last Fill Quantity: 90,  # refills: 3   Last office visit provider:  9/27/22     Requested Prescriptions   Pending Prescriptions Disp Refills     amLODIPine (NORVASC) 10 MG tablet 90 tablet 3     Sig: Take 1 tablet (10 mg) by mouth daily       There is no refill protocol information for this order      Signed Prescriptions Disp Refills    losartan (COZAAR) 100 MG tablet 90 tablet 2     Sig: Take 1 tablet (100 mg) by mouth daily       Angiotensin-II Receptors Passed - 3/20/2023  3:36 PM        Passed - Last blood pressure under 140/90 in past 12 months     BP Readings from Last 3 Encounters:   03/18/23 123/67   10/05/22 (!) 153/69   09/27/22 120/60                 Passed - Recent (12 mo) or future (30 days) visit within the authorizing provider's specialty     Patient has had an office visit with the authorizing provider or a provider within the authorizing providers department within the previous 12 mos or has a future within next 30 days. See \"Patient Info\" tab in inbasket, or \"Choose Columns\" in Meds & Orders section of the refill encounter.              Passed - Medication is active on med list        Passed - Patient is age 18 or older        Passed - Normal serum creatinine on file in past 12 months     Recent Labs   Lab Test 09/27/22  1328   CR 0.91       Ok to refill medication if creatinine is low          Passed - Normal serum potassium on file in past 12 months     Recent Labs   Lab Test 09/27/22  1328   POTASSIUM 4.2                         Bao Garza RN 03/20/23 3:43 PM  "

## 2023-03-20 NOTE — TELEPHONE ENCOUNTER
Refill Request  Medication name: Pending Prescriptions:                       Disp   Refills    losartan (COZAAR) 100 MG tablet           90 tab*3            Sig: Take 1 tablet (100 mg) by mouth daily    amLODIPine (NORVASC) 10 MG tablet         90 tab*3            Sig: Take 1 tablet (10 mg) by mouth daily    Requested Pharmacy: [facility]

## 2023-03-20 NOTE — TELEPHONE ENCOUNTER
"Last Written Prescription Date:  3/30/22  Last Fill Quantity: 90,  # refills: 3   Last office visit provider:  9/27/22     Requested Prescriptions   Pending Prescriptions Disp Refills     losartan (COZAAR) 100 MG tablet 90 tablet 3     Sig: Take 1 tablet (100 mg) by mouth daily       Angiotensin-II Receptors Passed - 3/20/2023  3:36 PM        Passed - Last blood pressure under 140/90 in past 12 months     BP Readings from Last 3 Encounters:   03/18/23 123/67   10/05/22 (!) 153/69   09/27/22 120/60                 Passed - Recent (12 mo) or future (30 days) visit within the authorizing provider's specialty     Patient has had an office visit with the authorizing provider or a provider within the authorizing providers department within the previous 12 mos or has a future within next 30 days. See \"Patient Info\" tab in inbasket, or \"Choose Columns\" in Meds & Orders section of the refill encounter.              Passed - Medication is active on med list        Passed - Patient is age 18 or older        Passed - Normal serum creatinine on file in past 12 months     Recent Labs   Lab Test 09/27/22  1328   CR 0.91       Ok to refill medication if creatinine is low          Passed - Normal serum potassium on file in past 12 months     Recent Labs   Lab Test 09/27/22  1328   POTASSIUM 4.2                       amLODIPine (NORVASC) 10 MG tablet 90 tablet 3     Sig: Take 1 tablet (10 mg) by mouth daily       There is no refill protocol information for this order          Bao Garza RN 03/20/23 3:39 PM  "

## 2023-03-27 ENCOUNTER — OFFICE VISIT (OUTPATIENT)
Dept: FAMILY MEDICINE | Facility: CLINIC | Age: 88
End: 2023-03-27
Payer: COMMERCIAL

## 2023-03-27 VITALS
OXYGEN SATURATION: 96 % | BODY MASS INDEX: 25.34 KG/M2 | HEIGHT: 71 IN | TEMPERATURE: 98.3 F | DIASTOLIC BLOOD PRESSURE: 62 MMHG | WEIGHT: 181 LBS | HEART RATE: 75 BPM | RESPIRATION RATE: 16 BRPM | SYSTOLIC BLOOD PRESSURE: 118 MMHG

## 2023-03-27 DIAGNOSIS — R91.8 MULTIPLE PULMONARY NODULES: ICD-10-CM

## 2023-03-27 DIAGNOSIS — N40.0 BENIGN LOCALIZED HYPERPLASIA OF PROSTATE: ICD-10-CM

## 2023-03-27 DIAGNOSIS — J43.9 PULMONARY EMPHYSEMA, UNSPECIFIED EMPHYSEMA TYPE (H): ICD-10-CM

## 2023-03-27 DIAGNOSIS — D50.9 IRON DEFICIENCY ANEMIA, UNSPECIFIED IRON DEFICIENCY ANEMIA TYPE: ICD-10-CM

## 2023-03-27 DIAGNOSIS — I10 ESSENTIAL HYPERTENSION, BENIGN: ICD-10-CM

## 2023-03-27 DIAGNOSIS — Z93.6 S/P ILEAL CONDUIT (H): ICD-10-CM

## 2023-03-27 DIAGNOSIS — I10 ESSENTIAL HYPERTENSION, BENIGN: Primary | ICD-10-CM

## 2023-03-27 DIAGNOSIS — N18.31 CHRONIC KIDNEY DISEASE, STAGE 3A (H): ICD-10-CM

## 2023-03-27 DIAGNOSIS — J44.1 COPD EXACERBATION (H): ICD-10-CM

## 2023-03-27 DIAGNOSIS — C67.9 MALIGNANT NEOPLASM OF URINARY BLADDER, UNSPECIFIED SITE (H): ICD-10-CM

## 2023-03-27 DIAGNOSIS — E53.8 B12 DEFICIENCY: ICD-10-CM

## 2023-03-27 DIAGNOSIS — M47.817 LUMBOSACRAL SPONDYLOSIS WITHOUT MYELOPATHY: ICD-10-CM

## 2023-03-27 DIAGNOSIS — C34.12 MALIGNANT NEOPLASM OF UPPER LOBE OF LEFT LUNG (H): ICD-10-CM

## 2023-03-27 DIAGNOSIS — C66.1 MALIGNANT NEOPLASM OF RIGHT URETER (H): ICD-10-CM

## 2023-03-27 LAB
ALBUMIN SERPL BCG-MCNC: 4.1 G/DL (ref 3.5–5.2)
ALP SERPL-CCNC: 55 U/L (ref 40–129)
ALT SERPL W P-5'-P-CCNC: 13 U/L (ref 10–50)
ANION GAP SERPL CALCULATED.3IONS-SCNC: 13 MMOL/L (ref 7–15)
AST SERPL W P-5'-P-CCNC: 19 U/L (ref 10–50)
BILIRUB SERPL-MCNC: 0.3 MG/DL
BUN SERPL-MCNC: 26.8 MG/DL (ref 8–23)
CALCIUM SERPL-MCNC: 9.4 MG/DL (ref 8.8–10.2)
CHLORIDE SERPL-SCNC: 106 MMOL/L (ref 98–107)
CHOLEST SERPL-MCNC: 187 MG/DL
CREAT SERPL-MCNC: 0.91 MG/DL (ref 0.67–1.17)
DEPRECATED HCO3 PLAS-SCNC: 23 MMOL/L (ref 22–29)
ERYTHROCYTE [DISTWIDTH] IN BLOOD BY AUTOMATED COUNT: 13.9 % (ref 10–15)
GFR SERPL CREATININE-BSD FRML MDRD: 81 ML/MIN/1.73M2
GLUCOSE SERPL-MCNC: 108 MG/DL (ref 70–99)
HCT VFR BLD AUTO: 39.8 % (ref 40–53)
HDLC SERPL-MCNC: 50 MG/DL
HGB BLD-MCNC: 12.7 G/DL (ref 13.3–17.7)
IRON BINDING CAPACITY (ROCHE): 265 UG/DL (ref 240–430)
IRON SATN MFR SERPL: 28 % (ref 15–46)
IRON SERPL-MCNC: 75 UG/DL (ref 61–157)
LDLC SERPL CALC-MCNC: 119 MG/DL
MCH RBC QN AUTO: 31.1 PG (ref 26.5–33)
MCHC RBC AUTO-ENTMCNC: 31.9 G/DL (ref 31.5–36.5)
MCV RBC AUTO: 98 FL (ref 78–100)
NONHDLC SERPL-MCNC: 137 MG/DL
PLATELET # BLD AUTO: 250 10E3/UL (ref 150–450)
POTASSIUM SERPL-SCNC: 4.7 MMOL/L (ref 3.4–5.3)
PROT SERPL-MCNC: 7 G/DL (ref 6.4–8.3)
RBC # BLD AUTO: 4.08 10E6/UL (ref 4.4–5.9)
SODIUM SERPL-SCNC: 142 MMOL/L (ref 136–145)
TRIGL SERPL-MCNC: 90 MG/DL
VIT B12 SERPL-MCNC: 563 PG/ML (ref 232–1245)
WBC # BLD AUTO: 4.5 10E3/UL (ref 4–11)

## 2023-03-27 PROCEDURE — 82607 VITAMIN B-12: CPT | Performed by: PHYSICIAN ASSISTANT

## 2023-03-27 PROCEDURE — 83550 IRON BINDING TEST: CPT | Performed by: PHYSICIAN ASSISTANT

## 2023-03-27 PROCEDURE — 80053 COMPREHEN METABOLIC PANEL: CPT | Performed by: PHYSICIAN ASSISTANT

## 2023-03-27 PROCEDURE — 36415 COLL VENOUS BLD VENIPUNCTURE: CPT | Performed by: PHYSICIAN ASSISTANT

## 2023-03-27 PROCEDURE — 99214 OFFICE O/P EST MOD 30 MIN: CPT | Performed by: PHYSICIAN ASSISTANT

## 2023-03-27 PROCEDURE — 85027 COMPLETE CBC AUTOMATED: CPT | Performed by: PHYSICIAN ASSISTANT

## 2023-03-27 PROCEDURE — 80061 LIPID PANEL: CPT | Performed by: PHYSICIAN ASSISTANT

## 2023-03-27 PROCEDURE — 83540 ASSAY OF IRON: CPT | Performed by: PHYSICIAN ASSISTANT

## 2023-03-27 RX ORDER — BUDESONIDE AND FORMOTEROL FUMARATE DIHYDRATE 160; 4.5 UG/1; UG/1
AEROSOL RESPIRATORY (INHALATION)
Qty: 60 G | Refills: 5 | Status: SHIPPED | OUTPATIENT
Start: 2023-03-27 | End: 2023-09-13

## 2023-03-27 RX ORDER — ALBUTEROL SULFATE 90 UG/1
AEROSOL, METERED RESPIRATORY (INHALATION)
Qty: 8.5 G | Refills: 5 | Status: SHIPPED | OUTPATIENT
Start: 2023-03-27 | End: 2024-05-06

## 2023-03-27 RX ORDER — AMLODIPINE BESYLATE 10 MG/1
10 TABLET ORAL DAILY
Qty: 90 TABLET | Refills: 3 | Status: SHIPPED | OUTPATIENT
Start: 2023-03-27 | End: 2024-06-03

## 2023-03-27 RX ORDER — LOSARTAN POTASSIUM 100 MG/1
100 TABLET ORAL DAILY
Qty: 90 TABLET | Refills: 3 | Status: SHIPPED | OUTPATIENT
Start: 2023-03-27 | End: 2024-06-03

## 2023-03-27 ASSESSMENT — PAIN SCALES - GENERAL: PAINLEVEL: NO PAIN (0)

## 2023-03-27 ASSESSMENT — ENCOUNTER SYMPTOMS
ARTHRALGIAS: 0
WEAKNESS: 0
CHILLS: 0
SHORTNESS OF BREATH: 0
PARESTHESIAS: 0
SORE THROAT: 0
HEMATURIA: 0
FEVER: 0
NAUSEA: 0
FREQUENCY: 0
HEADACHES: 0
COUGH: 0
DIZZINESS: 0
MYALGIAS: 0
PALPITATIONS: 0
CONSTIPATION: 0
HEARTBURN: 0
NERVOUS/ANXIOUS: 0
DIARRHEA: 0
EYE PAIN: 0
DYSURIA: 0
HEMATOCHEZIA: 0
ABDOMINAL PAIN: 0
JOINT SWELLING: 0

## 2023-03-27 NOTE — PROGRESS NOTES
Assessment & Plan       ICD-10-CM    1. Essential hypertension, benign  I10 losartan (COZAAR) 100 MG tablet     amLODIPine (NORVASC) 10 MG tablet      2. Benign Essential Hypertension  I10 Lipid panel reflex to direct LDL Non-fasting     Comprehensive metabolic panel (BMP + Alb, Alk Phos, ALT, AST, Total. Bili, TP)     Lipid panel reflex to direct LDL Non-fasting     Comprehensive metabolic panel (BMP + Alb, Alk Phos, ALT, AST, Total. Bili, TP)      3. Pulmonary emphysema, unspecified emphysema type (H)  J43.9 SYMBICORT 160-4.5 MCG/ACT Inhaler      4. B12 deficiency  E53.8 CBC with platelets     Vitamin B12     CBC with platelets     Vitamin B12      5. Primary Osteoarthritis Of The Lumbar Vertebrae  M47.817       6. Multiple pulmonary nodules  R91.8       7. Malignant neoplasm of urinary bladder, unspecified site (H)  C67.9       8. Malignant neoplasm of right ureter (H)  C66.1       9. Chronic kidney disease, stage 3a (H)  N18.31       10. Iron deficiency anemia, unspecified iron deficiency anemia type  D50.9 Iron and iron binding capacity     Iron and iron binding capacity      11. Benign localized hyperplasia of prostate  N40.0       12. S/P ileal conduit (H)  Z93.6       13. COPD exacerbation (H)  J44.1 albuterol (PROAIR HFA/PROVENTIL HFA/VENTOLIN HFA) 108 (90 Base) MCG/ACT inhaler      14. Malignant neoplasm of upper lobe of left lung (H)  C34.12         #1 hypertension  Blood pressure well controlled at 118/62.  Continue amlodipine and losartan for blood pressure management.    #2 hyperlipidemia  We will check a lipid panel today.  He is currently not on any cholesterol medications at this time.  We did discuss diet and exercise.    #3 COPD  #4 COPD exacerbation-resolved   he is using albuterol and Symbicort.  He feels that his breathing is stable.  He was treated for COPD exacerbation last week with doxycycline he feels that his symptoms have since returned to baseline.  Otherwise he feels that his  breathing is at baseline.  He is also wondering about a handicap permit as it is difficulty for him to walk long distance.  I think this is reasonable.  Paperwork filled out today.    #5 left upper lobe lung cancer  #6 pulmonary nodules  He does follow closely with radiation oncology.  History of an SCL see diagnosed in 2018 status postradiation.  Most recent chest CT 8/2022 did not show any new concerns of malignancy.  No new pulmonary nodules.    #7 BPH  Symptoms well controlled at this time    #8 bladder cancer status post ileal conduct  No new symptoms regarding this.  CT 7/2022 showed bladder thickening but negative biopsy.  Following closely with [facility] urology.    #9 iron deficiency anemia  Last iron level was on the lower and hemoglobin 12.9 9/27/2022.  He has since started a multivitamin with iron in it.  We will recheck a CBC and iron studies today.    #10 B12 deficiency  Currently taking oral B12.  Last B12 checked 4/14 2021 was stable at 941.  We will check a B12 level today    #11 osteoporosis of the lumbar vertebral column.  I do not see that he is ever had a osteoporotic fracture.  I did recommend vitamin D and calcium supplements.  No DEXA scan noted in his chart.  He does not wish to do a DEXA scan at this time    #12 chronic kidney disease  Last kidney function done 9/27/2022 was stable.  Blood pressure is well controlled.  He is to stay well-hydrated.  Avoid nephrotoxic medications including NSAIDs.  We will check his kidney function today    #13 colon cancer screening  He does not wish to do any further colonoscopies at this time     Follow-up Visit   Expected date:  Sep 27, 2023 (Approximate)      Follow Up Appointment Details:     Follow-up with whom?: Me    Follow-Up for what?: Medicare Wellness    Any Additional Chronic Condition Management?: General (Other)    Welcome or Annual?: Annual Wellness    How?: In Person                       BMI:   Estimated body mass index is 25.24 kg/m  as  "calculated from the following:    Height as of this encounter: 1.803 m (5' 11\").    Weight as of this encounter: 82.1 kg (181 lb).   Weight management plan: Discussed healthy diet and exercise guidelines      JOSÉ ANTONIO [facility]ero [facility] VINCENT Jones is a 88 year old, presenting for the following health issues:  Recheck Medication (Med check and refills )    Additional Questions 3/27/2023   Roomed by emma sanz cma     Drea is a pleasant 88-year-old male who presents to the clinic today for a chronic disease management follow-up.  Past medical history significant for hypertension, hyperlipidemia, COPD, history of non-small cell lung cancer status postradiation in 2018, pulmonary nodule, bladder cancer, iron deficiency anemia, chronic kidney disease, and BPH.    He is feeling well today no questions or concerns regarding his chronic health.  Medications include albuterol, Symbicort, amlodipine, losartan, B12, multivitamin with iron and B complex.    Last visit 9/27/2022 did show low ferritin so he has been supplementing with a multivitamin with iron.  Hemoglobin at that time was also on the low end at 12.9.  B12 level 4/14/2021 was stable at 941 and he does take oral B12 supplements.    He did undergo radiation for left upper lobe lung cancer.  He does follow-up with oncology.  He did undergo a CT of his chest 8/2022 which was negative for any malignancy.  No new pulmonary nodules noted last visit with radiation oncology 8/29/2022 was stable.  Recommended follow-up in 1 year.    He does have a history of bladder cancer initially diagnosed in 2002 with recurrence in 2012.  Status post ileal conduct 10/15/2022.  CT of his abdomen pelvis 7/2022 did show by bladder thickening with negative biopsies.  He does follow with [facility].    Currently using Symbicort and albuterol for underlying COPD.  He quit smoking in 1980.  He does have increased mucus production at baseline.  " Recently was treated for COPD exacerbation symptoms have significantly improved since antibiotics.    He did see [facility] in December 2022 and underwent to right knee injections since then his right knee pain has improved.    History of Present Illness       COPD:  He presents for follow up of COPD.  Overall, COPD symptoms are stable since last visit. He has same as usual fatigue or shortness of breath with exertion and same as usual shortness of breath at rest.  He sometimes coughs and does have change in sputum. No recent fever. He can walk less than 1 block without stopping to rest. He can walk 1 flights of stairs without resting.The patient has had no ED, urgent care, or hospital admissions because of COPD since the last visit.     He eats 0-1 servings of fruits and vegetables daily.He consumes 0 sweetened beverage(s) daily.He exercises with enough effort to increase his heart rate 10 to 19 minutes per day.  He exercises with enough effort to increase his heart rate 3 or less days per week.   He is taking medications regularly.         Review of Systems   Constitutional: Negative for chills and fever.   HENT: Negative for congestion, ear pain, hearing loss and sore throat.    Eyes: Negative for pain and visual disturbance.   Respiratory: Negative for cough and shortness of breath.    Cardiovascular: Negative for chest pain, palpitations and peripheral edema.   Gastrointestinal: Negative for abdominal pain, constipation, diarrhea, heartburn, hematochezia and nausea.   Genitourinary: Negative for dysuria, frequency, genital sores, hematuria, impotence, penile discharge and urgency.   Musculoskeletal: Negative for arthralgias, joint swelling and myalgias.   Skin: Negative for rash.   Neurological: Negative for dizziness, weakness, headaches and paresthesias.   Psychiatric/Behavioral: Negative for mood changes. The patient is not nervous/anxious.             Objective    /62   Pulse 75   Temp 98.3  " F (36.8  C)   Resp 16   Ht 1.803 m (5' 11\")   Wt 82.1 kg (181 lb)   SpO2 96%   BMI 25.24 kg/m    Body mass index is 25.24 kg/m .  Physical Exam  Vitals and nursing note reviewed.   Constitutional:       Appearance: Normal appearance.   HENT:      Head: Normocephalic and atraumatic.   Eyes:      Conjunctiva/sclera: Conjunctivae normal.   Cardiovascular:      Rate and Rhythm: Normal rate and regular rhythm.      Heart sounds: No murmur heard.    No friction rub. No gallop.   Pulmonary:      Effort: Pulmonary effort is normal.      Breath sounds: No wheezing, rhonchi or rales.   Musculoskeletal:      Right lower leg: No edema.      Left lower leg: No edema.   Skin:     General: Skin is warm and dry.   Neurological:      General: No focal deficit present.      Mental Status: He is alert.      Motor: No weakness.   Psychiatric:         Mood and Affect: Mood normal.         Behavior: Behavior normal.         Thought Content: Thought content normal.         Judgment: Judgment normal.                 "

## 2023-04-10 ENCOUNTER — TELEPHONE (OUTPATIENT)
Dept: FAMILY MEDICINE | Facility: CLINIC | Age: 88
End: 2023-04-10
Payer: COMMERCIAL

## 2023-04-10 NOTE — TELEPHONE ENCOUNTER
Forms Request  Name of form/paperwork: [place] [employer]  Have you been seen for this request: N/A  Do we have the form: yes, in [employer]'s inbox  When is form needed by: when done  How would you like the form returned: call when done  Patient Notified form requests are processed in 3-5 business days: yes    Okay to leave a detailed message? yes

## 2023-04-19 ENCOUNTER — OFFICE VISIT (OUTPATIENT)
Dept: FAMILY MEDICINE | Facility: CLINIC | Age: 88
End: 2023-04-19
Payer: COMMERCIAL

## 2023-04-19 VITALS
TEMPERATURE: 97.9 F | HEART RATE: 71 BPM | DIASTOLIC BLOOD PRESSURE: 60 MMHG | BODY MASS INDEX: 26.97 KG/M2 | OXYGEN SATURATION: 97 % | RESPIRATION RATE: 16 BRPM | HEIGHT: 69 IN | WEIGHT: 182.1 LBS | SYSTOLIC BLOOD PRESSURE: 130 MMHG

## 2023-04-19 DIAGNOSIS — Z02.9 ADMINISTRATIVE ENCOUNTER: Primary | ICD-10-CM

## 2023-04-19 PROCEDURE — 99212 OFFICE O/P EST SF 10 MIN: CPT | Performed by: PHYSICIAN ASSISTANT

## 2023-04-19 ASSESSMENT — ENCOUNTER SYMPTOMS
PALPITATIONS: 0
FEVER: 0
CHILLS: 0
GASTROINTESTINAL NEGATIVE: 1
SHORTNESS OF BREATH: 1
WHEEZING: 0
FATIGUE: 0
COUGH: 0
LIGHT-HEADEDNESS: 0
DIZZINESS: 0
EYES NEGATIVE: 1
HEADACHES: 0

## 2023-04-19 ASSESSMENT — PAIN SCALES - GENERAL: PAINLEVEL: NO PAIN (0)

## 2023-04-19 NOTE — PROGRESS NOTES
Assessment & Plan       ICD-10-CM    1. Administrative encounter  Z02.9         #1 encounter for paperwork  Drea presented to the clinic today for paperwork for the DMV.  He needs a letter stating that he is able to operate a motor vehicle on public highways and street safely using adaptive equipment.  Drea is not missing any limbs and is not needing any adaptive equipment to operate a motor vehicle at this time.  A letter was drafted and given to him.  He will take this to the DMV.  I feel that he is medically safe to operate a motor vehicle at this time.      Howard Luu PA-C  [facility]   Karen is a 88 year old, presenting for the following health issues:  Forms (Needs completion. Pt requested handicap sticker, and now they are wanting to know if pt is eligible to drive. COPD worse in winter, pt request for handicap parking. )        4/19/2023     2:07 PM   Additional Questions   Roomed by Louise GARG LPN     Forms 4/19/2023   Any forms needing to be completed Yes     Drea is a pleasant 88-year-old male who presents to the clinic today for paperwork.  He applied for a handicap sticker as he has underlying COPD and has difficulty ambulating from long distance causing him shortness of breath.  He received 2 letters from the DMV stating that he needed a letter from his primary care provider stating that he is able to drive with adaptive equipment.  Drea does not have any missing limbs or does not need any adaptive equipment to operate a motor vehicle safely.    History of Present Illness       Reason for visit:  Help  paper work    He eats 2-3 servings of fruits and vegetables daily.He consumes 0 sweetened beverage(s) daily.He exercises with enough effort to increase his heart rate 10 to 19 minutes per day.  He exercises with enough effort to increase his heart rate 4 days per week.   He is taking medications regularly.         Review of Systems  "  Constitutional: Negative for chills, fatigue and fever.   Eyes: Negative.    Respiratory: Positive for shortness of breath (chronic at baseline). Negative for cough and wheezing.    Cardiovascular: Negative for chest pain and palpitations.   Gastrointestinal: Negative.    Genitourinary: Negative.    Neurological: Negative for dizziness, light-headedness and headaches.            Objective    /60   Pulse 71   Temp 97.9  F (36.6  C) (Oral)   Resp 16   Ht 1.753 m (5' 9\")   Wt 82.6 kg (182 lb 1.6 oz)   SpO2 97%   BMI 26.89 kg/m    Body mass index is 26.89 kg/m .  Physical Exam  Vitals and nursing note reviewed.   Constitutional:       Appearance: Normal appearance.   HENT:      Head: Normocephalic and atraumatic.   Eyes:      Conjunctiva/sclera: Conjunctivae normal.   Cardiovascular:      Rate and Rhythm: Normal rate and regular rhythm.      Heart sounds: No murmur heard.     No friction rub. No gallop.   Pulmonary:      Effort: Pulmonary effort is normal.      Breath sounds: No wheezing or rhonchi.   Neurological:      General: No focal deficit present.      Mental Status: He is alert and oriented to person, place, and time.      Motor: No weakness.   Psychiatric:         Mood and Affect: Mood normal.         Behavior: Behavior normal.         Thought Content: Thought content normal.         Judgment: Judgment normal.                  "

## 2023-04-19 NOTE — LETTER
April 19, 2023      Karen Santiago  [address] [address]        To Whom It May Concern:    Karen Santiago was seen in our clinic. Karen is safe to operate a motor vehicle on public streets and highways.       Sincerely,        Howard Luu PA-C

## 2023-06-07 ENCOUNTER — TRANSFERRED RECORDS (OUTPATIENT)
Dept: HEALTH INFORMATION MANAGEMENT | Facility: CLINIC | Age: 88
End: 2023-06-07
Payer: COMMERCIAL

## 2023-08-17 ENCOUNTER — HOSPITAL ENCOUNTER (OUTPATIENT)
Dept: CT IMAGING | Facility: CLINIC | Age: 88
Discharge: HOME | End: 2023-08-17
Attending: RADIOLOGY | Admitting: RADIOLOGY
Payer: COMMERCIAL

## 2023-08-17 DIAGNOSIS — C34.12 MALIGNANT NEOPLASM OF UPPER LOBE OF LEFT LUNG (H): ICD-10-CM

## 2023-08-17 PROCEDURE — 71250 CT THORAX DX C-: CPT

## 2023-09-06 NOTE — PROGRESS NOTES
[facility] Radiation Oncology Follow Up     Patient: Karen Santiago  MRN: [id number]  Date of Service: 09/07/2023       DISEASE TREATED: Left upper lobe non-small cell lung cancer      TYPE OF RADIATION THERAPY ADMINISTERED: SBRT 5000 cGy in 5 fractions     INTERVAL SINCE COMPLETION OF RADIATION THERAPY: 8/23/2018 so 5 years       SUBJECTIVE:  Mr. Santiago is a 88 year old male who  was treated with SBRT for an 11 mm lung nodule in left upper lobe presumed to be cancer.     The patient has a history of urothelial and bladder carcinoma since 2002. Recurrence in 2012 and he underwent a cystoprostatectomy with ileal conduit urinary diversion on 10/15/2012. No neoadjuvant chemotherapy. He had recurrence in the left distal ureter and underwent a left distal ureterectomy with the excision of the cuff and reanastomosis 1/21/2013. 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 He required a stent which was removed. Currently HANNY from urological standpoint.      June 2014 patient was following up with pulmonology as there was some concern for anterior mediastinal lesion, initially thought to be a small benign anterior mediastinal cyst. Noted at that time were numerous small and tiny pulmonary nodules which had been stable.      He typically has COPD exacerbations requiring steroids and abx in fall.     His wife passed away recently from dementia     Medications were reviewed and are up to date on EPIC.    The following portions of the patient's history were reviewed and updated as appropriate: allergies, current medications, past family history, past medical history, past social history, past surgical history and problem list.    Review of Systems:      General  Constitutional  Constitutional (WDL): All constitutional elements are within defined limits  EENT  Eye Disorders  Eye Disorder (WDL): All eye disorder elements are within defined  "limits  Ear Disorders  Ear Disorder (WDL): All ear disorder elements are within defined limits  Respiratory  Respiratory  Respiratory (WDL): Exceptions to WDL  Dyspnea: Shortness of breath with moderate exertion (occasional SOB)  Cardiovascular  Cardiovascular  Cardiovascular (WDL): All cardiovascular elements are within defined limits (no pacemaker)  Gastrointestinal  Gastrointestinal  Gastrointestinal (WDL): All gastrointestinal elements are within defined limits  Musculoskeletal  Musculoskeletal and Connective Tissue Disorders  Musculoskeletal & Connective (WDL): Exceptions to WDL  Arthralgia: Mild pain (chronic back pain, unchanged)  Integumentary  Integumentary  Integumentary (WDL): All integumentary elements are within defined limits  Neurological  Neurosensory  Neurosensory (WDL): All neurosensory elements are within defined limits  Genitourinary/Reproductive  Genitourinary  Genitourinary (WDL): Exceptions to WDL (doesn't have a bladder, f/u at [place] recently got \"all clear\")  Lymphatic  Lymph System Disorders  Lymph (WDL): All lymph elements are within defined limits  Pain  Pain Score: No Pain (0)  AUA Assessment                                                              Accompanied by  Accompanied By: family (daughter Heidi)    Objective:          PHYSICAL EXAMINATION:    BP (!) 145/62 (BP Location: Right arm, Patient Position: Sitting, Cuff Size: Adult Regular)   Pulse 59   Temp 97.6  F (36.4  C) (Oral)   Resp 16   Wt 82.4 kg (181 lb 11.2 oz)   SpO2 97%   BMI 26.83 kg/m      Gen: Alert, in NAD  Eyes: PER sclera anicteric  HENT: NC/AT  Neck: Supple  Pulm: No increased work of breathing, speaks in complete sentences, CTA   CV: Well-perfused, no cyanosis   Musculoskeletal: No MSK defects noted.  Skin: Normal color and turgor  Neurologic: Nonfocal  Psychiatric: Appropriate mood and affect    Imaging: Imaging results 30 days: CT Chest w/o contrast    Result Date: 8/18/2023  EXAM: CT CHEST W/O CONTRAST " LOCATION: [facility] DATE: 8/17/2023 INDICATION: S P SBRT (2018) for enlarging DANA nodule, annual F U COMPARISON: CTs chest 08/16/2022, 08/12/2021, 7/6/2020 and 11/13/2019 TECHNIQUE: CT chest without IV contrast. Multiplanar reformats were obtained. Dose reduction techniques were used. CONTRAST: None. FINDINGS: LUNGS AND PLEURA: Fiducial marker and adjacent flat soft tissue focus 14 x 12 x 2 mm are unchanged since 2019 consistent with stable treated lesion. A few small stable benign perifissural and subpleural nodules in each lung with long-term stability. No new, enlarging or concerning pulmonary nodule. Bilateral calcified pleural plaques compatible with prior asbestos exposure with associated small stable focus of benign rounded atelectasis posteromedial basilar right lower lobe. Lungs are otherwise clear. No pleural effusion. MEDIASTINUM/AXILLAE: No lymphadenopathy. No thoracic aortic aneurysm. CORONARY ARTERY CALCIFICATION: Severe. UPPER ABDOMEN: Cholelithiasis. Benign stable hepatic and renal cysts. MUSCULOSKELETAL: Unremarkable.     IMPRESSION: 1.  Stable treated left upper lobe lesion. 2.  No evidence of recurrent disease.       Impression   (C34.12) (Presumed) malignant neoplasm of upper lobe of left lung (H)  (primary encounter diagnosis) Stage I , cT1N0  Comment: scan stable       Assessment & Plan:   1) 5 years out from SBRT for DANA NSCLC. Other nodules stable. No radiologic evidence of recurrent or new disease.  Follow up 1 year with scan    2) COPD - typically has exacerbations in fall requiring steroids and antibiotics. On symbicort and albuterol.      Total time of this visit, including time spent face-to-face with patient was 30 min  and also in preparing for today's visit for MDM and documentation. Medical decision-making included consideration and possible diagnoses, management options, complex record review, review of diagnostic tests and information, consideration and  discussion of significant complications based on comorbidities, discussion with providers involved in the care of the patient.    Alesia Hammond MD  Department of Radiation Oncology   [facility]  Tel: [phone]  Page: [phone]    [facility]  [address] [place] [address]     [place] [id number] [facility]   [place] [address]    CC:  Patient Care Team:  Howard Luu PA-C as PCP - General (Family Medicine)  Alesia Hammond MD as MD (Hematology & Oncology)  Alesia Hammond MD as Assigned Cancer Care Provider  Howard Luu PA-C as Assigned PCP

## 2023-09-07 ENCOUNTER — OFFICE VISIT (OUTPATIENT)
Dept: RADIATION ONCOLOGY | Facility: CLINIC | Age: 88
End: 2023-09-07
Attending: RADIOLOGY
Payer: COMMERCIAL

## 2023-09-07 VITALS
HEART RATE: 59 BPM | SYSTOLIC BLOOD PRESSURE: 145 MMHG | OXYGEN SATURATION: 97 % | BODY MASS INDEX: 26.83 KG/M2 | RESPIRATION RATE: 16 BRPM | WEIGHT: 181.7 LBS | TEMPERATURE: 97.6 F | DIASTOLIC BLOOD PRESSURE: 62 MMHG

## 2023-09-07 DIAGNOSIS — C34.12 MALIGNANT NEOPLASM OF UPPER LOBE OF LEFT LUNG (H): Primary | ICD-10-CM

## 2023-09-07 PROCEDURE — 99214 OFFICE O/P EST MOD 30 MIN: CPT | Performed by: RADIOLOGY

## 2023-09-07 PROCEDURE — G0463 HOSPITAL OUTPT CLINIC VISIT: HCPCS | Performed by: RADIOLOGY

## 2023-09-07 ASSESSMENT — PAIN SCALES - GENERAL: PAINLEVEL: NO PAIN (0)

## 2023-09-07 NOTE — PROGRESS NOTES
"Oncology Rooming Note    September 7, 2023 9:52 AM   Karen Santiago is a 88 year old male who presents for:    Chief Complaint   Patient presents with    Oncology Clinic Visit     Follow up with Dr. Hammond     Initial Vitals: BP (!) 145/62 (BP Location: Right arm, Patient Position: Sitting, Cuff Size: Adult Regular)   Pulse 59   Temp 97.6  F (36.4  C) (Oral)   Resp 16   Wt 82.4 kg (181 lb 11.2 oz)   SpO2 97%   BMI 26.83 kg/m   Estimated body mass index is 26.83 kg/m  as calculated from the following:    Height as of 4/19/23: 1.753 m (5' 9\").    Weight as of this encounter: 82.4 kg (181 lb 11.2 oz). Body surface area is 2 meters squared.  No Pain (0) Comment: Data Unavailable   No LMP for male patient.  Allergies reviewed: Yes  Medications reviewed: Yes    Medications: Medication refills not needed today.  Pharmacy name entered into [employer]: [id number] [address]    Clinical concerns: Patient here ambulatory accompanied by daughter Kitty for follow-up status post radiation for his lung cancer.  Patient had a recent CT scan and is here today for results.  Patient denies any changes states he is feeling good.  Seen by Dr. Hammond.  Plan return to clinic for follow-up as directed by provider.   Dr. Hammond was notified.      Marlin Elaine RN              "

## 2023-09-07 NOTE — LETTER
9/7/2023         RE: Karen Santiago  [address]  [place] [address]        Dear Colleague,    Thank you for referring your patient, Karen Santiago, to the [facility] RADIATION ONCOLOGY [place]. Please see a copy of my visit note below.    [facility] Radiation Oncology Follow Up     Patient: Karen Santiago  MRN: [id number]  Date of Service: 09/07/2023       DISEASE TREATED: Left upper lobe non-small cell lung cancer      TYPE OF RADIATION THERAPY ADMINISTERED: SBRT 5000 cGy in 5 fractions     INTERVAL SINCE COMPLETION OF RADIATION THERAPY: 8/23/2018 so 5 years       SUBJECTIVE:  Mr. Santiago is a 88 year old male who  was treated with SBRT for an 11 mm lung nodule in left upper lobe presumed to be cancer.     The patient has a history of urothelial and bladder carcinoma since 2002. Recurrence in 2012 and he underwent a cystoprostatectomy with ileal conduit urinary diversion on 10/15/2012. No neoadjuvant chemotherapy. He had recurrence in the left distal ureter and underwent a left distal ureterectomy with the excision of the cuff and reanastomosis 1/21/2013. 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 He required a stent which was removed. Currently HANNY from urological standpoint.      June 2014 patient was following up with pulmonology as there was some concern for anterior mediastinal lesion, initially thought to be a small benign anterior mediastinal cyst. Noted at that time were numerous small and tiny pulmonary nodules which had been stable.      He typically has COPD exacerbations requiring steroids and abx in fall.     His wife passed away recently from dementia     Medications were reviewed and are up to date on EPIC.    The following portions of the patient's history were reviewed and updated as appropriate: allergies, current medications, past family history, past medical history, past social  "history, past surgical history and problem list.    Review of Systems:      General  Constitutional  Constitutional (WDL): All constitutional elements are within defined limits  EENT  Eye Disorders  Eye Disorder (WDL): All eye disorder elements are within defined limits  Ear Disorders  Ear Disorder (WDL): All ear disorder elements are within defined limits  Respiratory  Respiratory  Respiratory (WDL): Exceptions to WDL  Dyspnea: Shortness of breath with moderate exertion (occasional SOB)  Cardiovascular  Cardiovascular  Cardiovascular (WDL): All cardiovascular elements are within defined limits (no pacemaker)  Gastrointestinal  Gastrointestinal  Gastrointestinal (WDL): All gastrointestinal elements are within defined limits  Musculoskeletal  Musculoskeletal and Connective Tissue Disorders  Musculoskeletal & Connective (WDL): Exceptions to WDL  Arthralgia: Mild pain (chronic back pain, unchanged)  Integumentary  Integumentary  Integumentary (WDL): All integumentary elements are within defined limits  Neurological  Neurosensory  Neurosensory (WDL): All neurosensory elements are within defined limits  Genitourinary/Reproductive  Genitourinary  Genitourinary (WDL): Exceptions to WDL (doesn't have a bladder, f/u at [place] recently got \"all clear\")  Lymphatic  Lymph System Disorders  Lymph (WDL): All lymph elements are within defined limits  Pain  Pain Score: No Pain (0)  AUA Assessment                                                              Accompanied by  Accompanied By: family (daughter Heidi)    Objective:          PHYSICAL EXAMINATION:    BP (!) 145/62 (BP Location: Right arm, Patient Position: Sitting, Cuff Size: Adult Regular)   Pulse 59   Temp 97.6  F (36.4  C) (Oral)   Resp 16   Wt 82.4 kg (181 lb 11.2 oz)   SpO2 97%   BMI 26.83 kg/m      Gen: Alert, in NAD  Eyes: PER sclera anicteric  HENT: NC/AT  Neck: Supple  Pulm: No increased work of breathing, speaks in complete sentences, CTA   CV: Well-perfused, " no cyanosis   Musculoskeletal: No MSK defects noted.  Skin: Normal color and turgor  Neurologic: Nonfocal  Psychiatric: Appropriate mood and affect    Imaging: Imaging results 30 days: CT Chest w/o contrast    Result Date: 8/18/2023  EXAM: CT CHEST W/O CONTRAST LOCATION: [facility] DATE: 8/17/2023 INDICATION: S P SBRT (2018) for enlarging DANA nodule, annual F U COMPARISON: CTs chest 08/16/2022, 08/12/2021, 7/6/2020 and 11/13/2019 TECHNIQUE: CT chest without IV contrast. Multiplanar reformats were obtained. Dose reduction techniques were used. CONTRAST: None. FINDINGS: LUNGS AND PLEURA: Fiducial marker and adjacent flat soft tissue focus 14 x 12 x 2 mm are unchanged since 2019 consistent with stable treated lesion. A few small stable benign perifissural and subpleural nodules in each lung with long-term stability. No new, enlarging or concerning pulmonary nodule. Bilateral calcified pleural plaques compatible with prior asbestos exposure with associated small stable focus of benign rounded atelectasis posteromedial basilar right lower lobe. Lungs are otherwise clear. No pleural effusion. MEDIASTINUM/AXILLAE: No lymphadenopathy. No thoracic aortic aneurysm. CORONARY ARTERY CALCIFICATION: Severe. UPPER ABDOMEN: Cholelithiasis. Benign stable hepatic and renal cysts. MUSCULOSKELETAL: Unremarkable.     IMPRESSION: 1.  Stable treated left upper lobe lesion. 2.  No evidence of recurrent disease.       Impression   (C34.12) (Presumed) malignant neoplasm of upper lobe of left lung (H)  (primary encounter diagnosis) Stage I , cT1N0  Comment: scan stable       Assessment & Plan:   1) 5 years out from SBRT for DANA NSCLC. Other nodules stable. No radiologic evidence of recurrent or new disease.  Follow up 1 year with scan    2) COPD - typically has exacerbations in fall requiring steroids and antibiotics. On symbicort and albuterol.      Total time of this visit, including time spent face-to-face with  "patient was 30 min  and also in preparing for today's visit for MDM and documentation. Medical decision-making included consideration and possible diagnoses, management options, complex record review, review of diagnostic tests and information, consideration and discussion of significant complications based on comorbidities, discussion with providers involved in the care of the patient.    Alesia Hammond MD  Department of Radiation Oncology   [facility]  Tel: [phone]  Page: [phone]    [facility]  [address] [place] [address]     [facility]   [address]   [place] [address]    CC:  Patient Care Team:  Howard Luu PA-C as PCP - General (Family Medicine)  Alesia Hammond MD as MD (Hematology & Oncology)  Alesia Hammond MD as Assigned Cancer Care Provider  Howard Luu PA-C as Assigned PCP      Oncology Rooming Note    September 7, 2023 9:52 AM   Karen Santiago is a 88 year old male who presents for:    Chief Complaint   Patient presents with     Oncology Clinic Visit     Follow up with Dr. Hammond     Initial Vitals: BP (!) 145/62 (BP Location: Right arm, Patient Position: Sitting, Cuff Size: Adult Regular)   Pulse 59   Temp 97.6  F (36.4  C) (Oral)   Resp 16   Wt 82.4 kg (181 lb 11.2 oz)   SpO2 97%   BMI 26.83 kg/m   Estimated body mass index is 26.83 kg/m  as calculated from the following:    Height as of 4/19/23: 1.753 m (5' 9\").    Weight as of this encounter: 82.4 kg (181 lb 11.2 oz). Body surface area is 2 meters squared.  No Pain (0) Comment: Data Unavailable   No LMP for male patient.  Allergies reviewed: Yes  Medications reviewed: Yes    Medications: Medication refills not needed today.  Pharmacy name entered into [employer]: [id number] - [place] - [address]    Clinical concerns: Patient here ambulatory accompanied by daughter Kitty for follow-up status post radiation for his " lung cancer.  Patient had a recent CT scan and is here today for results.  Patient denies any changes states he is feeling good.  Seen by Dr. Hammond.  Plan return to clinic for follow-up as directed by provider.   Dr. Hammond was notified.      Marlin Elaine RN                Again, thank you for allowing me to participate in the care of your patient.        Sincerely,        Alesia Hammond MD

## 2023-09-27 ENCOUNTER — OFFICE VISIT (OUTPATIENT)
Dept: FAMILY MEDICINE | Facility: CLINIC | Age: 88
End: 2023-09-27
Payer: COMMERCIAL

## 2023-09-27 VITALS
HEART RATE: 67 BPM | SYSTOLIC BLOOD PRESSURE: 108 MMHG | RESPIRATION RATE: 14 BRPM | HEIGHT: 69 IN | WEIGHT: 181 LBS | OXYGEN SATURATION: 96 % | TEMPERATURE: 98.7 F | DIASTOLIC BLOOD PRESSURE: 54 MMHG | BODY MASS INDEX: 26.81 KG/M2

## 2023-09-27 DIAGNOSIS — C67.9 MALIGNANT NEOPLASM OF URINARY BLADDER, UNSPECIFIED SITE (H): ICD-10-CM

## 2023-09-27 DIAGNOSIS — Z93.6 S/P ILEAL CONDUIT (H): ICD-10-CM

## 2023-09-27 DIAGNOSIS — C34.12 MALIGNANT NEOPLASM OF UPPER LOBE OF LEFT LUNG (H): ICD-10-CM

## 2023-09-27 DIAGNOSIS — E53.8 B12 DEFICIENCY: ICD-10-CM

## 2023-09-27 DIAGNOSIS — D50.9 IRON DEFICIENCY ANEMIA, UNSPECIFIED IRON DEFICIENCY ANEMIA TYPE: ICD-10-CM

## 2023-09-27 DIAGNOSIS — I10 ESSENTIAL HYPERTENSION, BENIGN: ICD-10-CM

## 2023-09-27 DIAGNOSIS — J43.9 PULMONARY EMPHYSEMA, UNSPECIFIED EMPHYSEMA TYPE (H): ICD-10-CM

## 2023-09-27 DIAGNOSIS — N18.31 CHRONIC KIDNEY DISEASE, STAGE 3A (H): ICD-10-CM

## 2023-09-27 DIAGNOSIS — R91.8 MULTIPLE PULMONARY NODULES: ICD-10-CM

## 2023-09-27 DIAGNOSIS — Z00.00 ENCOUNTER FOR ANNUAL WELLNESS VISIT (AWV) IN MEDICARE PATIENT: Primary | ICD-10-CM

## 2023-09-27 LAB
ANION GAP SERPL CALCULATED.3IONS-SCNC: 10 MMOL/L (ref 7–15)
BUN SERPL-MCNC: 22.7 MG/DL (ref 8–23)
CALCIUM SERPL-MCNC: 9.3 MG/DL (ref 8.8–10.2)
CHLORIDE SERPL-SCNC: 108 MMOL/L (ref 98–107)
CREAT SERPL-MCNC: 0.88 MG/DL (ref 0.67–1.17)
DEPRECATED HCO3 PLAS-SCNC: 26 MMOL/L (ref 22–29)
EGFRCR SERPLBLD CKD-EPI 2021: 83 ML/MIN/1.73M2
ERYTHROCYTE [DISTWIDTH] IN BLOOD BY AUTOMATED COUNT: 13.6 % (ref 10–15)
GLUCOSE SERPL-MCNC: 103 MG/DL (ref 70–99)
HCT VFR BLD AUTO: 40.9 % (ref 40–53)
HGB BLD-MCNC: 13.2 G/DL (ref 13.3–17.7)
MCH RBC QN AUTO: 31.4 PG (ref 26.5–33)
MCHC RBC AUTO-ENTMCNC: 32.3 G/DL (ref 31.5–36.5)
MCV RBC AUTO: 97 FL (ref 78–100)
PLATELET # BLD AUTO: 170 10E3/UL (ref 150–450)
POTASSIUM SERPL-SCNC: 4.8 MMOL/L (ref 3.4–5.3)
RBC # BLD AUTO: 4.2 10E6/UL (ref 4.4–5.9)
SODIUM SERPL-SCNC: 144 MMOL/L (ref 135–145)
WBC # BLD AUTO: 4.8 10E3/UL (ref 4–11)

## 2023-09-27 PROCEDURE — 85027 COMPLETE CBC AUTOMATED: CPT | Performed by: PHYSICIAN ASSISTANT

## 2023-09-27 PROCEDURE — G0008 ADMIN INFLUENZA VIRUS VAC: HCPCS | Performed by: PHYSICIAN ASSISTANT

## 2023-09-27 PROCEDURE — 99214 OFFICE O/P EST MOD 30 MIN: CPT | Mod: 25 | Performed by: PHYSICIAN ASSISTANT

## 2023-09-27 PROCEDURE — 36415 COLL VENOUS BLD VENIPUNCTURE: CPT | Performed by: PHYSICIAN ASSISTANT

## 2023-09-27 PROCEDURE — 80048 BASIC METABOLIC PNL TOTAL CA: CPT | Performed by: PHYSICIAN ASSISTANT

## 2023-09-27 PROCEDURE — 90480 ADMN SARSCOV2 VAC 1/ONLY CMP: CPT | Performed by: PHYSICIAN ASSISTANT

## 2023-09-27 PROCEDURE — 90662 IIV NO PRSV INCREASED AG IM: CPT | Performed by: PHYSICIAN ASSISTANT

## 2023-09-27 PROCEDURE — 91320 SARSCV2 VAC 30MCG TRS-SUC IM: CPT | Performed by: PHYSICIAN ASSISTANT

## 2023-09-27 PROCEDURE — G0438 PPPS, INITIAL VISIT: HCPCS | Performed by: PHYSICIAN ASSISTANT

## 2023-09-27 ASSESSMENT — ENCOUNTER SYMPTOMS
NERVOUS/ANXIOUS: 0
PALPITATIONS: 0
WEAKNESS: 0
NAUSEA: 0
SORE THROAT: 0
FEVER: 0
ABDOMINAL PAIN: 0
HEARTBURN: 0
EYE PAIN: 0
COUGH: 0
CHILLS: 0
ARTHRALGIAS: 1
HEMATURIA: 0
DYSURIA: 0
HEADACHES: 0
HEMATOCHEZIA: 0
PARESTHESIAS: 0
MYALGIAS: 0
CONSTIPATION: 0
SHORTNESS OF BREATH: 1
DIZZINESS: 0
FREQUENCY: 0
DIARRHEA: 0
JOINT SWELLING: 0

## 2023-09-27 ASSESSMENT — ACTIVITIES OF DAILY LIVING (ADL): CURRENT_FUNCTION: NO ASSISTANCE NEEDED

## 2023-09-27 NOTE — LETTER
September 28, 2023      Karen Santiago  [address] [place] [facility] [address]        Dear ,    Complete Blood Count: White blood cell, red blood cell, platelets, and hemoglobin are stable.   Metabolic panel : Kidney function and electrolytes are stable     Resulted Orders   CBC with platelets   Result Value Ref Range    WBC Count 4.8 4.0 - 11.0 10e3/uL    RBC Count 4.20 (L) 4.40 - 5.90 10e6/uL    Hemoglobin 13.2 (L) 13.3 - 17.7 g/dL    Hematocrit 40.9 40.0 - 53.0 %    MCV 97 78 - 100 fL    MCH 31.4 26.5 - 33.0 pg    MCHC 32.3 31.5 - 36.5 g/dL    RDW 13.6 10.0 - 15.0 %    Platelet Count 170 150 - 450 10e3/uL   Basic metabolic panel   Result Value Ref Range    Sodium 144 135 - 145 mmol/L      Comment:      Reference intervals for this test were updated on 09/26/2023 to more accurately reflect our healthy population. There may be differences in the flagging of prior results with similar values performed with this method. Interpretation of those prior results can be made in the context of the updated reference intervals.     Potassium 4.8 3.4 - 5.3 mmol/L    Chloride 108 (H) 98 - 107 mmol/L    Carbon Dioxide (CO2) 26 22 - 29 mmol/L    Anion Gap 10 7 - 15 mmol/L    Urea Nitrogen 22.7 8.0 - 23.0 mg/dL    Creatinine 0.88 0.67 - 1.17 mg/dL    GFR Estimate 83 >60 mL/min/1.73m2    Calcium 9.3 8.8 - 10.2 mg/dL    Glucose 103 (H) 70 - 99 mg/dL       If you have any questions or concerns, please call the clinic at the number listed above.       Sincerely,      Howard Luu PA-C

## 2023-09-27 NOTE — PROGRESS NOTES
"SUBJECTIVE:   Karen is a 88 year old who presents for Preventive Visit.      9/27/2023    10:07 AM   Additional Questions   Roomed by Zuleyka Sheehan   Accompanied by none       Are you in the first 12 months of your Medicare coverage?  No    Drea is a pleasant 88-year-old male who presents to the clinic today for a Medicare annual wellness visit    Past medical history significant for HTN, COPD, CKD, iron deficiency anemia and B12 deficiency anemia, bladder cancer, and lung cancer.  He is feeling well today.    He has been having issues with his right knee.  He was diagnosed with osteoarthritis.  He is seeing orthopedics.  He has been undergoing steroid injections.  They suggested a total knee replacement and he is thinking about this.    He is currently on amlodipine and losartan for blood pressure management.    Currently using albuterol as needed and Symbicort for COPD.  Symptoms have been well controlled    History of B12 and iron deficiency anemia.  Currently on supplements.    Diagnosed with bladder cancer 2002 s/p ileal conduct.  Also was diagnosed with lung cancer in 2018 status post radiation.  He does follow closely with both [facility] and radiation oncology.    Healthy Habits:     In general, how would you rate your overall health?  Good    Frequency of exercise:  2-3 days/week    Duration of exercise:  Less than 15 minutes    Do you usually eat at least 4 servings of fruit and vegetables a day, include whole grains    & fiber and avoid regularly eating high fat or \"junk\" foods?  Yes    Taking medications regularly:  Yes    Medication side effects:  None    Ability to successfully perform activities of daily living:  No assistance needed    Home Safety:  No safety concerns identified    Hearing Impairment:  Difficulty following a conversation in a noisy restaurant or crowded room and find that men's voices are easier to understand than woman's    In the past 6 months, have you been bothered by leaking " of urine?  No    In general, how would you rate your overall mental or emotional health?  Good      Today's PHQ-2 Score:       2023    10:01 AM   PHQ-2 (  [employer])   Q1: Little interest or pleasure in doing things 0   Q2: Feeling down, depressed or hopeless 0   PHQ-2 Score 0   Q1: Little interest or pleasure in doing things Not at all   Q2: Feeling down, depressed or hopeless Not at all   PHQ-2 Score 0           Have you ever done Advance Care Planning? (For example, a Health Directive, POLST, or a discussion with a medical provider or your loved ones about your wishes): Yes, patient states has an Advance Care Planning document and will bring a copy to the clinic.       Fall risk  Fallen 2 or more times in the past year?: No  Any fall with injury in the past year?: No  click delete button to remove this line now  Cognitive Screening   1) Repeat 3 items (Leader, Season, Table)    2) Clock draw: NORMAL  3) 3 item recall: Recalls 3 objects  Results: 3 items recalled: COGNITIVE IMPAIRMENT LESS LIKELY    Mini-CogTM Copyright [employer]. Licensed by the author for use in [facility]; reprinted with permission (geena@[facility]). All rights reserved.      Do you have sleep apnea, excessive snoring or daytime drowsiness? : yes    Reviewed and updated as needed this visit by clinical staff   Tobacco  Allergies  Meds              Reviewed and updated as needed this visit by Provider                 Social History     Tobacco Use    Smoking status: Former     Types: Cigarettes     Quit date: 1980     Years since quittin.7     Passive exposure: Past    Smokeless tobacco: Never   Substance Use Topics    Alcohol use: No     Comment: Alcoholic Drinks/day: 2-3 beer per month             2023    10:00 AM   Alcohol Use   Prescreen: >3 drinks/day or >7 drinks/week? No       Current providers sharing in care for this patient include:   Patient Care Team:  Howard Luu PA-C as PCP - General (Family  Medicine)  Alesia Hammond MD as MD (Hematology & Oncology)  Alesia Hammond MD as Assigned Cancer Care Provider  Howard Luu PA-C as Assigned PCP    The following health maintenance items are reviewed in Epic and correct as of today:  Health Maintenance   Topic Date Due    MICROALBUMIN  Never done    COPD ACTION PLAN  Never done    ANNUAL REVIEW OF HM ORDERS  09/20/2022    COVID-19 Vaccine (6 - Pf[employer] series) 01/27/2024    BMP  03/27/2024    LIPID  03/27/2024    MEDICARE ANNUAL WELLNESS VISIT  09/27/2024    FALL RISK ASSESSMENT  09/27/2024    HEMOGLOBIN  09/27/2024    DTAP/TDAP/TD IMMUNIZATION (4 - Td or Tdap) 06/14/2027    ADVANCE CARE PLANNING  09/27/2028    SPIROMETRY  Completed    PHQ-2 (once per calendar year)  Completed    INFLUENZA VACCINE  Completed    Pneumococcal Vaccine: 65+ Years  Completed    URINALYSIS  Completed    ZOSTER IMMUNIZATION  Completed    IPV IMMUNIZATION  Aged Out    HPV IMMUNIZATION  Aged Out    MENINGITIS IMMUNIZATION  Aged Out     Lab work is in process    Review of Systems   Constitutional:  Negative for chills and fever.   HENT:  Negative for congestion, ear pain, hearing loss and sore throat.    Eyes:  Negative for pain and visual disturbance.   Respiratory:  Positive for shortness of breath (at baseline). Negative for cough.    Cardiovascular:  Negative for chest pain, palpitations and peripheral edema.   Gastrointestinal:  Negative for abdominal pain, constipation, diarrhea, heartburn, hematochezia and nausea.   Genitourinary:  Positive for impotence. Negative for dysuria, frequency, genital sores, hematuria, penile discharge and urgency.   Musculoskeletal:  Positive for arthralgias. Negative for joint swelling and myalgias.        Right knee pain.    Skin:  Negative for rash.   Neurological:  Negative for dizziness, weakness, headaches and paresthesias.   Psychiatric/Behavioral:  Negative for mood changes. The patient is not nervous/anxious.   "      OBJECTIVE:   /54 (BP Location: Left arm, Patient Position: Sitting, Cuff Size: Adult Regular)   Pulse 67   Temp 98.7  F (37.1  C) (Oral)   Resp 14   Ht 1.753 m (5' 9\")   Wt 82.1 kg (181 lb)   SpO2 96%   BMI 26.73 kg/m   Estimated body mass index is 26.73 kg/m  as calculated from the following:    Height as of this encounter: 1.753 m (5' 9\").    Weight as of this encounter: 82.1 kg (181 lb).  Physical Exam  Vitals and nursing note reviewed.   Constitutional:       General: He is not in acute distress.     Appearance: Normal appearance. He is not ill-appearing, toxic-appearing or diaphoretic.   HENT:      Head: Normocephalic and atraumatic.   Eyes:      Conjunctiva/sclera: Conjunctivae normal.   Cardiovascular:      Rate and Rhythm: Normal rate and regular rhythm.      Heart sounds: No murmur heard.     No friction rub. No gallop.   Pulmonary:      Effort: Pulmonary effort is normal.      Breath sounds: No wheezing, rhonchi or rales.   Musculoskeletal:      Right lower leg: No edema.      Left lower leg: No edema.   Skin:     General: Skin is warm and dry.   Neurological:      General: No focal deficit present.      Mental Status: He is alert and oriented to person, place, and time.      Motor: No weakness.   Psychiatric:         Mood and Affect: Mood normal.         Behavior: Behavior normal.         Thought Content: Thought content normal.         Judgment: Judgment normal.           ASSESSMENT / PLAN:     Encounter for annual wellness visit (AWV) in Medicare patient  Will update labs including a CBC and a BMP today.  COVID and flu vaccine updated.  Gaps in his care have been closed.    Benign Essential Hypertension  Well-controlled at this time.  Continue with amlodipine and losartan.    Pulmonary emphysema, unspecified emphysema type (H)  Continue with albuterol as needed and Symbicort daily.  He feels that his breathing is stable at this time.  Typically does have an exacerbation every fall but " so far he is doing well.    Chronic kidney disease, stage 3a (H)  Blood pressure well controlled today.  He is to avoid nephrotoxic medications including NSAIDs.  Stay well-hydrated.  We will check BMP today.  - Basic metabolic panel; Future  - Basic metabolic panel    Iron deficiency anemia, unspecified iron deficiency anemia type  Currently on iron supplements.  Last hemoglobin 3/23 was stable at 12.7.  We will check CBC today  Iron level 6 months ago was stabel. No need to recheck this today as he is on supplements. Will recheck in 6 months.   - CBC with platelets; Future  - CBC with platelets    B12 deficiency  Continue with B12 supplements.  We will check CBC.  B12 level 6 months ago 563. Will recheck in 6 months.     Malignant neoplasm of upper lobe of left lung (H)  Recently saw a radiation oncology 9/7/2023.  Repeat CT of his chest 8/7/2023 was negative for recurrent malignancy.  He is 5 years out from SBRT.  He will follow-up with radiation oncology and 1 year.    CT chest 8/17/23  IMPRESSION:   1.  Stable treated left upper lobe lesion.   2.  No evidence of recurrent disease.    Malignant neoplasm of urinary bladder, unspecified site (H)  S/P ileal conduit (H)  Status post ileal conduct.  Initially diagnosed with bladder cancer in 2002 with recurrence in 2012.  He is not having any issues at this time.  Underwent a CT urogram 7/19/2023 which did not show any evidence of metastatic disease.  He has not seen [facility] urology for about a year.  Recommended having him follow-up.    CT urogram 7/19/2022  1. Diffuse thickening and hyperenhancement of the right ureter from the UPJ to the conduit, without   a focal lesion. The distal 1 cm is thickened and did not distend, but is not causing significant   obstruction. This could be all reactive inflammatory, but is indeterminate.     2. Tiny filling defect in the left lower pole calyx (see annotated screen capture image).     3. No evidence of metastatic  "disease.     Multiple pulmonary nodules  Most recent CT of his chest 8/7/2023 did not show any evidence of pulmonary nodules    Hot flashes  Notes hot flashes at night on occasion not daily.  He has been having this for 3 months.  No intentional weight loss.  Last CT of his chest and CT urogram did not show any recurrent malignancy.  Offered to do a urinalysis today for further evaluation.  He would like to continue to monitor things at this time.  He is otherwise feeling well.  We will check blood counts and kidney function.  If symptoms continue or worsen he is to follow-up for further evaluation.Follow up with [facility] urology.      Follow-up Visit   Expected date:  Mar 27, 2024 (Approximate)      Follow Up Appointment Details:     Follow-up with whom?: Me    Follow-Up for what?: Medicare Wellness    Any Additional Chronic Condition Management?: General (Other)    Welcome or Annual?: Welcome to Medicare    How?: In Person                     Current Outpatient Medications   Medication    acetaminophen 500 MG CAPS    albuterol (PROAIR HFA/PROVENTIL HFA/VENTOLIN HFA) 108 (90 Base) MCG/ACT inhaler    amLODIPine (NORVASC) 10 MG tablet    losartan (COZAAR) 100 MG tablet    Multiple Vitamins-Iron (MULTI-VITAMIN  /IRON) TABS    SYMBICORT 160-4.5 MCG/ACT Inhaler    vitamin B complex with vitamin C (STRESS TAB) tablet    vitamin B-12 (CYANOCOBALAMIN) 100 MCG tablet     No current facility-administered medications for this visit.       Patient has been advised of split billing requirements and indicates understanding: Yes      COUNSELING:  Reviewed preventive health counseling, as reflected in patient instructions       Regular exercise       Healthy diet/nutrition       Vision screening      BMI:   Estimated body mass index is 26.73 kg/m  as calculated from the following:    Height as of this encounter: 1.753 m (5' 9\").    Weight as of this encounter: 82.1 kg (181 lb).   Weight management plan: Discussed healthy diet " and exercise guidelines      He reports that he quit smoking about 43 years ago. His smoking use included cigarettes. He has been exposed to tobacco smoke. He has never used smokeless tobacco.      Appropriate preventive services were discussed with this patient, including applicable screening as appropriate for cardiovascular disease, diabetes, osteopenia/osteoporosis, and glaucoma.  As appropriate for age/gender, discussed screening for colorectal cancer, prostate cancer, breast cancer, and cervical cancer. Checklist reviewing preventive services available has been given to the patient.    Reviewed patients plan of care and provided an AVS. The Basic Care Plan (routine screening as documented in Health Maintenance) for Karen meets the Care Plan requirement. This Care Plan has been established and reviewed with the Patient.          Howard Luu PA-C  [facility]

## 2023-10-25 ENCOUNTER — TELEPHONE (OUTPATIENT)
Dept: FAMILY MEDICINE | Facility: CLINIC | Age: 88
End: 2023-10-25
Payer: COMMERCIAL

## 2023-10-25 DIAGNOSIS — J43.9 PULMONARY EMPHYSEMA, UNSPECIFIED EMPHYSEMA TYPE (H): Primary | ICD-10-CM

## 2023-10-25 NOTE — TELEPHONE ENCOUNTER
Forms Request  Name of form/paperwork: Jaime  Have you been seen for this request:   Do we have the form: On [facility] desk  When is form needed by: When completed  How would you like the form returned: Return call    Patient did not want to make an appointment. Patient wants provider to call in a new prescription for his COPD. He states that the current prescription will be to much for him to pay for due to insurance coverage.

## 2023-10-25 NOTE — TELEPHONE ENCOUNTER
Formulary list on [facility] desk.    Zuleyka Sheehan MA on 10/25/2023 at 5:02 PM

## 2023-10-27 DIAGNOSIS — J43.9 PULMONARY EMPHYSEMA, UNSPECIFIED EMPHYSEMA TYPE (H): ICD-10-CM

## 2023-10-27 RX ORDER — MOMETASONE FUROATE AND FORMOTEROL FUMARATE DIHYDRATE 100; 5 UG/1; UG/1
2 AEROSOL RESPIRATORY (INHALATION) 2 TIMES DAILY
Qty: 13 G | Refills: 5 | Status: SHIPPED | OUTPATIENT
Start: 2023-10-27 | End: 2024-05-14

## 2023-10-27 RX ORDER — BUDESONIDE AND FORMOTEROL FUMARATE DIHYDRATE 160; 4.5 UG/1; UG/1
AEROSOL RESPIRATORY (INHALATION)
Qty: 10.2 G | Refills: 11 | Status: SHIPPED | OUTPATIENT
Start: 2023-10-27 | End: 2023-10-27

## 2023-10-27 NOTE — TELEPHONE ENCOUNTER
Talked to patient and relayed message below per [employer]. Pt will  prescription when he needs, he just picked up his Symbicort inhaler.     Magda Porter CMA.

## 2023-12-14 ENCOUNTER — TRANSFERRED RECORDS (OUTPATIENT)
Dept: HEALTH INFORMATION MANAGEMENT | Facility: CLINIC | Age: 88
End: 2023-12-14
Payer: COMMERCIAL

## 2024-01-01 ENCOUNTER — HOSPITAL ENCOUNTER (EMERGENCY)
Facility: CLINIC | Age: 89
Discharge: HOME | End: 2024-01-01
Attending: EMERGENCY MEDICINE | Admitting: EMERGENCY MEDICINE
Payer: COMMERCIAL

## 2024-01-01 ENCOUNTER — APPOINTMENT (OUTPATIENT)
Dept: RADIOLOGY | Facility: CLINIC | Age: 89
End: 2024-01-01
Attending: EMERGENCY MEDICINE
Payer: COMMERCIAL

## 2024-01-01 VITALS
RESPIRATION RATE: 18 BRPM | BODY MASS INDEX: 25.34 KG/M2 | DIASTOLIC BLOOD PRESSURE: 76 MMHG | TEMPERATURE: 98.9 F | HEART RATE: 87 BPM | HEIGHT: 70 IN | OXYGEN SATURATION: 96 % | WEIGHT: 177 LBS | SYSTOLIC BLOOD PRESSURE: 145 MMHG

## 2024-01-01 DIAGNOSIS — J44.1 COPD EXACERBATION (H): ICD-10-CM

## 2024-01-01 LAB
ANION GAP SERPL CALCULATED.3IONS-SCNC: 9 MMOL/L (ref 7–15)
ATRIAL RATE - MUSE: 75 BPM
BASOPHILS # BLD AUTO: 0 10E3/UL (ref 0–0.2)
BASOPHILS NFR BLD AUTO: 0 %
BUN SERPL-MCNC: 24.5 MG/DL (ref 8–23)
CALCIUM SERPL-MCNC: 8.8 MG/DL (ref 8.8–10.2)
CHLORIDE SERPL-SCNC: 107 MMOL/L (ref 98–107)
CREAT SERPL-MCNC: 0.95 MG/DL (ref 0.67–1.17)
DEPRECATED HCO3 PLAS-SCNC: 24 MMOL/L (ref 22–29)
DIASTOLIC BLOOD PRESSURE - MUSE: NORMAL MMHG
EGFRCR SERPLBLD CKD-EPI 2021: 77 ML/MIN/1.73M2
EOSINOPHIL # BLD AUTO: 0 10E3/UL (ref 0–0.7)
EOSINOPHIL NFR BLD AUTO: 1 %
ERYTHROCYTE [DISTWIDTH] IN BLOOD BY AUTOMATED COUNT: 13.7 % (ref 10–15)
FLUAV RNA SPEC QL NAA+PROBE: NEGATIVE
FLUBV RNA RESP QL NAA+PROBE: NEGATIVE
GLUCOSE SERPL-MCNC: 122 MG/DL (ref 70–99)
HCT VFR BLD AUTO: 36.2 % (ref 40–53)
HGB BLD-MCNC: 11.9 G/DL (ref 13.3–17.7)
HOLD SPECIMEN: NORMAL
HOLD SPECIMEN: NORMAL
IMM GRANULOCYTES # BLD: 0 10E3/UL
IMM GRANULOCYTES NFR BLD: 0 %
INTERPRETATION ECG - MUSE: NORMAL
LYMPHOCYTES # BLD AUTO: 0.9 10E3/UL (ref 0.8–5.3)
LYMPHOCYTES NFR BLD AUTO: 17 %
MCH RBC QN AUTO: 31.2 PG (ref 26.5–33)
MCHC RBC AUTO-ENTMCNC: 32.9 G/DL (ref 31.5–36.5)
MCV RBC AUTO: 95 FL (ref 78–100)
MONOCYTES # BLD AUTO: 0.7 10E3/UL (ref 0–1.3)
MONOCYTES NFR BLD AUTO: 13 %
NEUTROPHILS # BLD AUTO: 3.7 10E3/UL (ref 1.6–8.3)
NEUTROPHILS NFR BLD AUTO: 69 %
NRBC # BLD AUTO: 0 10E3/UL
NRBC BLD AUTO-RTO: 0 /100
NT-PROBNP SERPL-MCNC: 418 PG/ML (ref 0–1800)
P AXIS - MUSE: 66 DEGREES
PLATELET # BLD AUTO: 195 10E3/UL (ref 150–450)
POTASSIUM SERPL-SCNC: 3.8 MMOL/L (ref 3.4–5.3)
PR INTERVAL - MUSE: 186 MS
QRS DURATION - MUSE: 106 MS
QT - MUSE: 412 MS
QTC - MUSE: 460 MS
R AXIS - MUSE: 79 DEGREES
RBC # BLD AUTO: 3.81 10E6/UL (ref 4.4–5.9)
RSV RNA SPEC NAA+PROBE: NEGATIVE
SARS-COV-2 RNA RESP QL NAA+PROBE: NEGATIVE
SODIUM SERPL-SCNC: 140 MMOL/L (ref 135–145)
SYSTOLIC BLOOD PRESSURE - MUSE: NORMAL MMHG
T AXIS - MUSE: 77 DEGREES
TROPONIN T SERPL HS-MCNC: 20 NG/L
TROPONIN T SERPL HS-MCNC: 24 NG/L
VENTRICULAR RATE- MUSE: 75 BPM
WBC # BLD AUTO: 5.4 10E3/UL (ref 4–11)

## 2024-01-01 PROCEDURE — 99285 EMERGENCY DEPT VISIT HI MDM: CPT | Mod: 25

## 2024-01-01 PROCEDURE — 36415 COLL VENOUS BLD VENIPUNCTURE: CPT | Performed by: EMERGENCY MEDICINE

## 2024-01-01 PROCEDURE — 258N000003 HC RX IP 258 OP 636: Performed by: EMERGENCY MEDICINE

## 2024-01-01 PROCEDURE — 96360 HYDRATION IV INFUSION INIT: CPT

## 2024-01-01 PROCEDURE — 84484 ASSAY OF TROPONIN QUANT: CPT | Performed by: EMERGENCY MEDICINE

## 2024-01-01 PROCEDURE — 94640 AIRWAY INHALATION TREATMENT: CPT

## 2024-01-01 PROCEDURE — 250N000012 HC RX MED GY IP 250 OP 636 PS 637: Performed by: EMERGENCY MEDICINE

## 2024-01-01 PROCEDURE — 85025 COMPLETE CBC W/AUTO DIFF WBC: CPT | Performed by: EMERGENCY MEDICINE

## 2024-01-01 PROCEDURE — 87637 SARSCOV2&INF A&B&RSV AMP PRB: CPT | Performed by: EMERGENCY MEDICINE

## 2024-01-01 PROCEDURE — 250N000013 HC RX MED GY IP 250 OP 250 PS 637: Performed by: EMERGENCY MEDICINE

## 2024-01-01 PROCEDURE — 93005 ELECTROCARDIOGRAM TRACING: CPT | Performed by: EMERGENCY MEDICINE

## 2024-01-01 PROCEDURE — 83880 ASSAY OF NATRIURETIC PEPTIDE: CPT | Performed by: EMERGENCY MEDICINE

## 2024-01-01 PROCEDURE — 250N000009 HC RX 250: Performed by: EMERGENCY MEDICINE

## 2024-01-01 PROCEDURE — 71046 X-RAY EXAM CHEST 2 VIEWS: CPT

## 2024-01-01 PROCEDURE — 80048 BASIC METABOLIC PNL TOTAL CA: CPT | Performed by: EMERGENCY MEDICINE

## 2024-01-01 RX ORDER — PREDNISONE 20 MG/1
40 TABLET ORAL ONCE
Status: COMPLETED | OUTPATIENT
Start: 2024-01-01 | End: 2024-01-01

## 2024-01-01 RX ORDER — ALBUTEROL SULFATE 0.83 MG/ML
5 SOLUTION RESPIRATORY (INHALATION) ONCE
Status: COMPLETED | OUTPATIENT
Start: 2024-01-01 | End: 2024-01-01

## 2024-01-01 RX ORDER — DOXYCYCLINE 100 MG/1
100 CAPSULE ORAL 2 TIMES DAILY
Qty: 14 CAPSULE | Refills: 0 | Status: SHIPPED | OUTPATIENT
Start: 2024-01-01 | End: 2024-01-08

## 2024-01-01 RX ORDER — PREDNISONE 20 MG/1
20 TABLET ORAL DAILY
Qty: 12 TABLET | Refills: 0 | Status: SHIPPED | OUTPATIENT
Start: 2024-01-02 | End: 2024-02-14

## 2024-01-01 RX ORDER — DOXYCYCLINE 100 MG/1
100 CAPSULE ORAL ONCE
Status: COMPLETED | OUTPATIENT
Start: 2024-01-01 | End: 2024-01-01

## 2024-01-01 RX ADMIN — ALBUTEROL SULFATE 5 MG: 2.5 SOLUTION RESPIRATORY (INHALATION) at 12:09

## 2024-01-01 RX ADMIN — DOXYCYCLINE HYCLATE 100 MG: 100 CAPSULE ORAL at 13:42

## 2024-01-01 RX ADMIN — SODIUM CHLORIDE 500 ML: 9 INJECTION, SOLUTION INTRAVENOUS at 13:25

## 2024-01-01 RX ADMIN — PREDNISONE 40 MG: 20 TABLET ORAL at 13:25

## 2024-01-01 ASSESSMENT — ACTIVITIES OF DAILY LIVING (ADL)
ADLS_ACUITY_SCORE: 35
ADLS_ACUITY_SCORE: 35
ADLS_ACUITY_SCORE: 33

## 2024-01-01 ASSESSMENT — ENCOUNTER SYMPTOMS
SHORTNESS OF BREATH: 1
COUGH: 1

## 2024-01-01 NOTE — ED TRIAGE NOTES
5 day history of a congested cough.  History of COPD.     Triage Assessment (Adult)       Row Name 01/01/24 1051          Triage Assessment    Airway WDL WDL        Respiratory WDL    Respiratory WDL cough;X     Cough Type productive        Skin Circulation/Temperature WDL    Skin Circulation/Temperature WDL WDL        Cardiac WDL    Cardiac WDL WDL        Peripheral/Neurovascular WDL    Peripheral Neurovascular WDL WDL        Cognitive/Neuro/Behavioral WDL    Cognitive/Neuro/Behavioral WDL WDL

## 2024-01-01 NOTE — ED PROVIDER NOTES
Emergency Department Encounter     Evaluation Date & Time:   2024 10:55 AM    CHIEF COMPLAINT:  Shortness of Breath and Cough      Triage Note:5 day history of a congested cough.  History of COPD.     Triage Assessment (Adult)       Row Name 24 1053          Triage Assessment    Airway WDL WDL        Respiratory WDL    Respiratory WDL cough;X     Cough Type productive        Skin Circulation/Temperature WDL    Skin Circulation/Temperature WDL WDL        Cardiac WDL    Cardiac WDL WDL        Peripheral/Neurovascular WDL    Peripheral Neurovascular WDL WDL        Cognitive/Neuro/Behavioral WDL    Cognitive/Neuro/Behavioral WDL WDL                         FINAL IMPRESSION:    ICD-10-CM    1. COPD exacerbation (H)  J44.1           Impression and Plan     ED COURSE & MEDICAL DECISION MAKIN:21 AM I met with the patient, obtained history, performed an initial exam, and discussed options and plan for diagnostics and treatment here in the ED.   1:16 PM I rechecked and updated the patient on further plan of care.        ED Course as of 24 1520   Mon 2024   1146 Karen is 89 and has a history of COPD.  He states typically in mid winter he will come down with a cough and ends up needing to do a course of prednisone and antibiotic to get over it.  He does feel that this is similar.  However, he has been getting a bit more dyspneic at rest according to his daughter just at home with this.  He has not noticed any new peripheral edema.  He is at risk for underlying heart disease worsening and so will do EKG, troponin to rule out a recent cardiac event within the last 4 days, BNP to look for signs of congestion or right-sided heart strain, and chest x-ray to rule out pneumonia, mass, effusion.  Less likely that this is PE given the story sounds most consistent with viral illness.   1312 His troponin is just above normal, and there are no previous high-sensitivity troponins to compare to.  He does  appear to be slightly dehydrated which may be contributing to it.  So, we will give a little bit of fluid and repeat troponin is ordered for 2 PM.  Otherwise his imaging and imaging results were reviewed by myself.   1517 Troponin improving.  Patient discharged home.       At the conclusion of the encounter I discussed the results of all the tests and the disposition. The questions were answered. The patient or family acknowledged understanding and was agreeable with the care plan.          0 minutes of critical care time        MEDICATIONS GIVEN IN THE EMERGENCY DEPARTMENT:  Medications   albuterol (PROVENTIL) neb solution 5 mg (5 mg Nebulization $Given 1/1/24 1209)   predniSONE (DELTASONE) tablet 40 mg (40 mg Oral $Given 1/1/24 1325)   sodium chloride 0.9% BOLUS 500 mL (0 mLs Intravenous Stopped 1/1/24 1406)   doxycycline hyclate (VIBRAMYCIN) capsule 100 mg (100 mg Oral $Given 1/1/24 1342)       NEW PRESCRIPTIONS STARTED AT TODAY'S ED VISIT:  New Prescriptions    DOXYCYCLINE HYCLATE (VIBRAMYCIN) 100 MG CAPSULE    Take 1 capsule (100 mg) by mouth 2 times daily for 7 days    PREDNISONE (DELTASONE) 20 MG TABLET    Take 1 tablet (20 mg) by mouth daily Take 2 tab po d 1-3, then 1 tab po d 4-7, then 1/2 tab po d 8-11, then discontinue       HPI     Karen Santiago is a 89 year old male with a pertinent history of hypertension, CKD stage 3a, COPD who presents to this ED via walk-in for evaluation of shortness of breath and cough.    The patient reports that for the past 5 days, he has been experiencing a worsening cough and shortness of breath. He notes that it feels like there is mucus in his chest and that he is having difficulty producing phlegm with his cough. Patient says that when he does produce phlegm, it is clear. He also mentions that his cough makes it difficult for him to sleep. He has not been using his albuterol rescue inhaler more than he normally does at baseline. The patient's daughter notes that the  patient typically takes breaks while walking to catch his breath, and believes that his shortness of breath has been worse since the onset of his symptoms 5 days ago.    PMHx: Patient has a history of COPD and notes that he uses his Dulera inhaler twice daily and his albuterol inhaler as needed every 4 hours, as they are prescribed. He does not take prednisone at home for his COPD. Patient endorses that he typically gets bronchitis on about a yearly basis, and that prednisone has previously helped relieve similar symptoms for him.    SHx: The patient's daughter notes that the patient was exposed to possible illness over the [place] holiday last week, noting that her brother (patient's son) hasn't been feeling well recently. Patient also notes that there were multiple grandchildren and great-grandchildren at the holiday celebrations.     REVIEW OF SYSTEMS:  Review of Systems   Respiratory:  Positive for cough and shortness of breath.    All other systems reviewed and are negative.    remainder of systems are all otherwise negative.        Medical History     Past Medical History:   Diagnosis Date    Cancer (H)     COPD (chronic obstructive pulmonary disease) (H)     Hypertension     Rheumatoid arthritis (H)        Past Surgical History:   Procedure Laterality Date    HC REMOVAL TESTIS,SIMPLE      Description: Orchiectomy Left;  Recorded: 04/26/2010;  Comments: for benign interstitial tumor    [facility] CYSTECTOMY,ILEAL CONDUIT/SIGMOID BLADDER      Description: Complete Bladder Cystectomy With Ureteroileal Conduit;  Proc Date: 01/01/2002;  Comments: cystoprostatectomy for squamous cell carcinoma per [facility] REMV PROSTATE,RETROPUBIC,SUBTOTAL      Description: Prostatectomy, Retropubic;  Recorded: 05/19/2009;  Comments: 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    Social History     Tobacco Use    Smoking status: Former     Types: Cigarettes     Quit date: 1980     Years since quittin.0     Passive exposure: Past    Smokeless tobacco: Never   Vaping Use    Vaping Use: Never used   Substance Use Topics    Alcohol use: No     Comment: Alcoholic Drinks/day: 2-3 beer per month    Drug use: No       doxycycline hyclate (VIBRAMYCIN) 100 MG capsule  [START ON 2024] predniSONE (DELTASONE) 20 MG tablet  acetaminophen 500 MG CAPS  albuterol (PROAIR HFA/PROVENTIL HFA/VENTOLIN HFA) 108 (90 Base) MCG/ACT inhaler  amLODIPine (NORVASC) 10 MG tablet  losartan (COZAAR) 100 MG tablet  mometasone-formoterol (DULERA) 100-5 MCG/ACT inhaler  Multiple Vitamins-Iron (MULTI-VITAMIN  /IRON) TABS  vitamin B complex with vitamin C (STRESS TAB) tablet  vitamin B-12 (CYANOCOBALAMIN) 100 MCG tablet        Physical Exam     First Vitals:  Patient Vitals for the past 24 hrs:   BP Temp Temp src Pulse Resp SpO2 Height Weight   24 1329 130/60 -- -- 87 -- 95 % -- --   24 1209 -- -- -- 75 14 99 % -- --   24 1204 122/60 -- -- 79 20 96 % -- --   24 1149 126/65 -- -- 79 18 95 % -- --   24 1115 -- -- -- 81 -- 96 % -- --   24 1051 112/59 98.9  F (37.2  C) Temporal 96 18 94 % 1.778 m (5' 10\") 80.3 kg (177 lb)       PHYSICAL EXAM:   Constitutional:   Semi-reclined, conversive   HENT:  Normocephalic, posterior pharynx wnl, mucous membranes not examined due to patient wearing a face mask  Eyes:  PERRL, EOMI, Conjunctiva normal, No discharge, no scleral icterus.  Respiratory:  Breathing easily, right lung clear to auscultation, left lower lung slight crackles with expiratory rhonchi throughout left lung. Breathing easily and speaking full sentences.  Cardiovascular:  Distant heart sounds, nl s1s2 0 murmurs, rubs, or gallops. Trace dp pulses, strong bilateral radial pulses. No " peripheral edema   GI:  Bowel sounds normal, Soft, No tenderness, No flank tenderness, nondistended.  :No CVA tenderness.   Musculoskeletal:  Moves all extremities.  No erythematous or swollen major joints,   Integument:  Normal color  Lymphatic:  No cervical lymphadenopathy  Neurologic:  Alert & oriented x 3, Normal motor function, Normal sensory function, No focal deficits noted. Normal speech.  Psychiatric:  Affect normal, Judgment normal, Mood normal.     Results     LAB AND RADIOLOGY:  All pertinent labs reviewed and interpreted  Results for orders placed or performed during the hospital encounter of 01/01/24   Chest XR,  PA & LAT     Status: None    Narrative    EXAM: XR CHEST 2 VIEWS  LOCATION: [facility]  DATE: 1/1/2024    INDICATION: cough, dyspnea, copd  COMPARISON: 08/17/2023.      Impression    IMPRESSION: Left lung fiducials and small left upper lobe nodule to the treatment changes in the left upper lobe. No infiltrate, pleural effusion or pneumothorax. Normal heart size.   Symptomatic Influenza A/B, RSV, & SARS-CoV2 PCR (COVID-19) Nasopharyngeal     Status: Normal    Specimen: Nasopharyngeal; Swab   Result Value Ref Range    Influenza A PCR Negative Negative    Influenza B PCR Negative Negative    RSV PCR Negative Negative    SARS CoV2 PCR Negative Negative    Narrative    Testing was performed using the Xpert Xpress CoV2/Flu/RSV Assay on the [employer] GeneXpert Instrument. This test should be ordered for the detection of SARS-CoV-2, influenza, and RSV viruses in individuals who meet clinical and/or epidemiological criteria. Test performance is unknown in asymptomatic patients. This test is for in vitro diagnostic use under the FDA EUA for laboratories certified under CLIA to perform high or moderate complexity testing. This test has not been FDA cleared or approved. A negative result does not rule out the presence of PCR inhibitors in the specimen or target RNA in  concentration below the limit of detection for the assay. If only one viral target is positive but coinfection with multiple targets is suspected, the sample should be re-tested with another FDA cleared, approved, or authorized test, if coinfection would change clinical management. This test was validated by the [facility] [employer]. These laboratories are certified under the Clinical Laboratory Improvement Amendments of 1988 (CLIA-88) as qualified to perform high complexity laboratory testing.   Basic metabolic panel     Status: Abnormal   Result Value Ref Range    Sodium 140 135 - 145 mmol/L    Potassium 3.8 3.4 - 5.3 mmol/L    Chloride 107 98 - 107 mmol/L    Carbon Dioxide (CO2) 24 22 - 29 mmol/L    Anion Gap 9 7 - 15 mmol/L    Urea Nitrogen 24.5 (H) 8.0 - 23.0 mg/dL    Creatinine 0.95 0.67 - 1.17 mg/dL    GFR Estimate 77 >60 mL/min/1.73m2    Calcium 8.8 8.8 - 10.2 mg/dL    Glucose 122 (H) 70 - 99 mg/dL   Nt probnp inpatient (BNP)     Status: Normal   Result Value Ref Range    N terminal Pro BNP Inpatient 418 0 - 1,800 pg/mL   Troponin T, High Sensitivity     Status: Abnormal   Result Value Ref Range    Troponin T, High Sensitivity 24 (H) <=22 ng/L   Troponin T, High Sensitivity     Status: Normal   Result Value Ref Range    Troponin T, High Sensitivity 20 <=22 ng/L   CBC with platelets and differential     Status: Abnormal   Result Value Ref Range    WBC Count 5.4 4.0 - 11.0 10e3/uL    RBC Count 3.81 (L) 4.40 - 5.90 10e6/uL    Hemoglobin 11.9 (L) 13.3 - 17.7 g/dL    Hematocrit 36.2 (L) 40.0 - 53.0 %    MCV 95 78 - 100 fL    MCH 31.2 26.5 - 33.0 pg    MCHC 32.9 31.5 - 36.5 g/dL    RDW 13.7 10.0 - 15.0 %    Platelet Count 195 150 - 450 10e3/uL    % Neutrophils 69 %    % Lymphocytes 17 %    % Monocytes 13 %    % Eosinophils 1 %    % Basophils 0 %    % Immature Granulocytes 0 %    NRBCs per 100 WBC 0 <1 /100    Absolute Neutrophils 3.7 1.6 - 8.3 10e3/uL    Absolute Lymphocytes 0.9 0.8 - 5.3 10e3/uL     Absolute Monocytes 0.7 0.0 - 1.3 10e3/uL    Absolute Eosinophils 0.0 0.0 - 0.7 10e3/uL    Absolute Basophils 0.0 0.0 - 0.2 10e3/uL    Absolute Immature Granulocytes 0.0 <=0.4 10e3/uL    Absolute NRBCs 0.0 10e3/uL   Extra Tube     Status: None (In process)    Narrative    The following orders were created for panel order Extra Tube.  Procedure                               Abnormality         Status                     ---------                               -----------         ------                     Extra Blue Top Tube[[id number]]                              In process                 Extra Red Top Tube[[id number]]                               In process                   Please view results for these tests on the individual orders.   ECG 12-LEAD WITH MUSE (LHE)     Status: None   Result Value Ref Range    Systolic Blood Pressure  mmHg    Diastolic Blood Pressure  mmHg    Ventricular Rate 75 BPM    Atrial Rate 75 BPM    NC Interval 186 ms    QRS Duration 106 ms     ms    QTc 460 ms    P Axis 66 degrees    R AXIS 79 degrees    T Axis 77 degrees    Interpretation ECG       Sinus rhythm  Normal ECG  No previous ECGs available  Confirmed by SEE ED PROVIDER NOTE FOR, ECG INTERPRETATION ([id number]),  Sharon West ([id number]) on 1/1/2024 2:05:11 PM     CBC with platelets differential     Status: Abnormal    Narrative    The following orders were created for panel order CBC with platelets differential.  Procedure                               Abnormality         Status                     ---------                               -----------         ------                     CBC with platelets and d...[[id number]]  Abnormal            Final result                 Please view results for these tests on the individual orders.         ECG:    Performed at: 01-JAN-2024 11:52    Impression: Sinus rhythm. Normal ECG. No previous ECGs available.    Rate: 75 bpm  Rhythm: Sinus rhythm  Axis: 66 79 77   NC Interval: 186 ms  QRS  Interval: 106 ms  QTc Interval: 412/460 ms  Comparison: No previous ECGs available    I have independently reviewed and interpreted the EKS(s) documented above    PROCEDURES:  None      [facility] Documentation     Medical Decision Making    History:  Supplemental history from: Documented in chart, if applicable and Family Member/Significant Other  External Record(s) reviewed: Documented in chart, if applicable.  10/5/22 office visit for copd exacerbation also reviewed which noted typical improvement on prednisone/doxycycline for copd exacerbations, so ordered same today.    Work Up:  Chart documentation includes differential considered and any EKGs or imaging independently interpreted by provider, where specified.  In additional to work up documented, I considered the following work up: Documented in chart, if applicable.    External consultation:  Discussion of management with another provider: Documented in chart, if applicable    Complicating factors:  Care impacted by chronic illness: Chronic Kidney Disease, Chronic Lung Disease, and Hypertension  Care affected by social determinants of health: N/A    Disposition considerations: Discharge. I prescribed additional prescription strength medication(s) as charted. See documentation for any additional details.       The creation of this record is based on the scribe s observations of the work being performed by Disha Max and the provider s statements to them. This document has been checked and approved by MD Polina Vu MD  Emergency Medicine  [facility] EMERGENCY ROOM         Polina Noe MD  01/01/24 152

## 2024-01-01 NOTE — DISCHARGE INSTRUCTIONS
Follow-up with your physician for your emphysema towards the end of your treatment for repeat evaluation.  Return here obviously earlier sooner if you needed for worsened symptoms for repeat evaluation    Your next dose of the prednisone is tomorrow.  If you can  your medications it would be helpful to have an next dose of your doxycycline this evening, but otherwise tomorrow morning with food is okay.

## 2024-01-11 ENCOUNTER — TRANSFERRED RECORDS (OUTPATIENT)
Dept: HEALTH INFORMATION MANAGEMENT | Facility: CLINIC | Age: 89
End: 2024-01-11
Payer: COMMERCIAL

## 2024-02-14 ENCOUNTER — NURSE TRIAGE (OUTPATIENT)
Dept: FAMILY MEDICINE | Facility: CLINIC | Age: 89
End: 2024-02-14

## 2024-02-14 ENCOUNTER — HOSPITAL ENCOUNTER (EMERGENCY)
Facility: CLINIC | Age: 89
Discharge: HOME | End: 2024-02-14
Attending: EMERGENCY MEDICINE | Admitting: EMERGENCY MEDICINE
Payer: COMMERCIAL

## 2024-02-14 ENCOUNTER — APPOINTMENT (OUTPATIENT)
Dept: CT IMAGING | Facility: CLINIC | Age: 89
End: 2024-02-14
Attending: EMERGENCY MEDICINE
Payer: COMMERCIAL

## 2024-02-14 VITALS
OXYGEN SATURATION: 98 % | WEIGHT: 180 LBS | RESPIRATION RATE: 23 BRPM | DIASTOLIC BLOOD PRESSURE: 62 MMHG | TEMPERATURE: 98 F | SYSTOLIC BLOOD PRESSURE: 122 MMHG | BODY MASS INDEX: 25.77 KG/M2 | HEIGHT: 70 IN | HEART RATE: 78 BPM

## 2024-02-14 DIAGNOSIS — J18.9 COMMUNITY ACQUIRED PNEUMONIA, UNSPECIFIED LATERALITY: ICD-10-CM

## 2024-02-14 DIAGNOSIS — J40 BRONCHITIS: ICD-10-CM

## 2024-02-14 DIAGNOSIS — R55 SYNCOPE AND COLLAPSE: ICD-10-CM

## 2024-02-14 LAB
ANION GAP SERPL CALCULATED.3IONS-SCNC: 10 MMOL/L (ref 7–15)
APTT PPP: 30 SECONDS (ref 22–38)
BASOPHILS # BLD AUTO: 0 10E3/UL (ref 0–0.2)
BASOPHILS NFR BLD AUTO: 0 %
BUN SERPL-MCNC: 21.2 MG/DL (ref 8–23)
CALCIUM SERPL-MCNC: 8.8 MG/DL (ref 8.8–10.2)
CHLORIDE SERPL-SCNC: 106 MMOL/L (ref 98–107)
CREAT SERPL-MCNC: 1 MG/DL (ref 0.67–1.17)
DEPRECATED HCO3 PLAS-SCNC: 25 MMOL/L (ref 22–29)
EGFRCR SERPLBLD CKD-EPI 2021: 72 ML/MIN/1.73M2
EOSINOPHIL # BLD AUTO: 0 10E3/UL (ref 0–0.7)
EOSINOPHIL NFR BLD AUTO: 0 %
ERYTHROCYTE [DISTWIDTH] IN BLOOD BY AUTOMATED COUNT: 14.2 % (ref 10–15)
FLUAV RNA SPEC QL NAA+PROBE: NEGATIVE
FLUBV RNA RESP QL NAA+PROBE: NEGATIVE
GLUCOSE BLDC GLUCOMTR-MCNC: 129 MG/DL (ref 70–99)
GLUCOSE SERPL-MCNC: 142 MG/DL (ref 70–99)
HCT VFR BLD AUTO: 38.2 % (ref 40–53)
HGB BLD-MCNC: 12.6 G/DL (ref 13.3–17.7)
IMM GRANULOCYTES # BLD: 0.1 10E3/UL
IMM GRANULOCYTES NFR BLD: 1 %
INR PPP: 0.98 (ref 0.85–1.15)
LYMPHOCYTES # BLD AUTO: 1 10E3/UL (ref 0.8–5.3)
LYMPHOCYTES NFR BLD AUTO: 9 %
MCH RBC QN AUTO: 30.9 PG (ref 26.5–33)
MCHC RBC AUTO-ENTMCNC: 33 G/DL (ref 31.5–36.5)
MCV RBC AUTO: 94 FL (ref 78–100)
MONOCYTES # BLD AUTO: 0.7 10E3/UL (ref 0–1.3)
MONOCYTES NFR BLD AUTO: 6 %
NEUTROPHILS # BLD AUTO: 9.4 10E3/UL (ref 1.6–8.3)
NEUTROPHILS NFR BLD AUTO: 84 %
NRBC # BLD AUTO: 0 10E3/UL
NRBC BLD AUTO-RTO: 0 /100
PLATELET # BLD AUTO: 185 10E3/UL (ref 150–450)
POTASSIUM SERPL-SCNC: 4.4 MMOL/L (ref 3.4–5.3)
RADIOLOGIST FLAGS: ABNORMAL
RBC # BLD AUTO: 4.08 10E6/UL (ref 4.4–5.9)
RSV RNA SPEC NAA+PROBE: NEGATIVE
SARS-COV-2 RNA RESP QL NAA+PROBE: NEGATIVE
SODIUM SERPL-SCNC: 141 MMOL/L (ref 135–145)
TROPONIN T SERPL HS-MCNC: 19 NG/L
TROPONIN T SERPL HS-MCNC: 26 NG/L
WBC # BLD AUTO: 11.2 10E3/UL (ref 4–11)

## 2024-02-14 PROCEDURE — 85730 THROMBOPLASTIN TIME PARTIAL: CPT | Performed by: EMERGENCY MEDICINE

## 2024-02-14 PROCEDURE — 36415 COLL VENOUS BLD VENIPUNCTURE: CPT | Performed by: EMERGENCY MEDICINE

## 2024-02-14 PROCEDURE — 84484 ASSAY OF TROPONIN QUANT: CPT | Mod: 91 | Performed by: EMERGENCY MEDICINE

## 2024-02-14 PROCEDURE — 82962 GLUCOSE BLOOD TEST: CPT

## 2024-02-14 PROCEDURE — 93005 ELECTROCARDIOGRAM TRACING: CPT | Performed by: EMERGENCY MEDICINE

## 2024-02-14 PROCEDURE — 99285 EMERGENCY DEPT VISIT HI MDM: CPT | Mod: 25

## 2024-02-14 PROCEDURE — 71275 CT ANGIOGRAPHY CHEST: CPT

## 2024-02-14 PROCEDURE — 87637 SARSCOV2&INF A&B&RSV AMP PRB: CPT | Performed by: EMERGENCY MEDICINE

## 2024-02-14 PROCEDURE — 250N000012 HC RX MED GY IP 250 OP 636 PS 637: Performed by: EMERGENCY MEDICINE

## 2024-02-14 PROCEDURE — 250N000009 HC RX 250: Performed by: EMERGENCY MEDICINE

## 2024-02-14 PROCEDURE — 258N000003 HC RX IP 258 OP 636: Performed by: EMERGENCY MEDICINE

## 2024-02-14 PROCEDURE — 80048 BASIC METABOLIC PNL TOTAL CA: CPT | Performed by: EMERGENCY MEDICINE

## 2024-02-14 PROCEDURE — 85610 PROTHROMBIN TIME: CPT | Performed by: EMERGENCY MEDICINE

## 2024-02-14 PROCEDURE — 96360 HYDRATION IV INFUSION INIT: CPT | Mod: 59

## 2024-02-14 PROCEDURE — 999N000157 HC STATISTIC RCP TIME EA 10 MIN

## 2024-02-14 PROCEDURE — 85025 COMPLETE CBC W/AUTO DIFF WBC: CPT | Performed by: EMERGENCY MEDICINE

## 2024-02-14 PROCEDURE — 250N000011 HC RX IP 250 OP 636: Performed by: EMERGENCY MEDICINE

## 2024-02-14 PROCEDURE — 94640 AIRWAY INHALATION TREATMENT: CPT

## 2024-02-14 PROCEDURE — 84484 ASSAY OF TROPONIN QUANT: CPT | Performed by: EMERGENCY MEDICINE

## 2024-02-14 PROCEDURE — 250N000013 HC RX MED GY IP 250 OP 250 PS 637: Performed by: EMERGENCY MEDICINE

## 2024-02-14 RX ORDER — DOXYCYCLINE 100 MG/1
100 CAPSULE ORAL ONCE
Status: COMPLETED | OUTPATIENT
Start: 2024-02-14 | End: 2024-02-14

## 2024-02-14 RX ORDER — IOPAMIDOL 755 MG/ML
75 INJECTION, SOLUTION INTRAVASCULAR ONCE
Status: COMPLETED | OUTPATIENT
Start: 2024-02-14 | End: 2024-02-14

## 2024-02-14 RX ORDER — PREDNISONE 20 MG/1
40 TABLET ORAL DAILY
Status: DISCONTINUED | OUTPATIENT
Start: 2024-02-14 | End: 2024-02-14 | Stop reason: HOSPADM

## 2024-02-14 RX ORDER — PREDNISONE 20 MG/1
40 TABLET ORAL DAILY
Qty: 6 TABLET | Refills: 0 | Status: SHIPPED | OUTPATIENT
Start: 2024-02-14 | End: 2024-02-17

## 2024-02-14 RX ORDER — IPRATROPIUM BROMIDE AND ALBUTEROL SULFATE 2.5; .5 MG/3ML; MG/3ML
3 SOLUTION RESPIRATORY (INHALATION) ONCE
Status: COMPLETED | OUTPATIENT
Start: 2024-02-14 | End: 2024-02-14

## 2024-02-14 RX ORDER — DOXYCYCLINE 100 MG/1
100 CAPSULE ORAL 2 TIMES DAILY
Qty: 14 CAPSULE | Refills: 0 | Status: SHIPPED | OUTPATIENT
Start: 2024-02-14 | End: 2024-02-21

## 2024-02-14 RX ADMIN — IPRATROPIUM BROMIDE AND ALBUTEROL SULFATE 3 ML: .5; 3 SOLUTION RESPIRATORY (INHALATION) at 14:03

## 2024-02-14 RX ADMIN — IOPAMIDOL 75 ML: 755 INJECTION, SOLUTION INTRAVENOUS at 13:59

## 2024-02-14 RX ADMIN — PREDNISONE 40 MG: 20 TABLET ORAL at 14:10

## 2024-02-14 RX ADMIN — SODIUM CHLORIDE 500 ML: 9 INJECTION, SOLUTION INTRAVENOUS at 14:06

## 2024-02-14 RX ADMIN — DOXYCYCLINE HYCLATE 100 MG: 100 CAPSULE ORAL at 16:14

## 2024-02-14 RX ADMIN — AMOXICILLIN AND CLAVULANATE POTASSIUM 1 TABLET: 875; 125 TABLET, FILM COATED ORAL at 16:14

## 2024-02-14 ASSESSMENT — ACTIVITIES OF DAILY LIVING (ADL)
ADLS_ACUITY_SCORE: 35
ADLS_ACUITY_SCORE: 35

## 2024-02-14 NOTE — TELEPHONE ENCOUNTER
"S-(situation): patient in clinic for [facility] and fainted at check in desk at 10am    B-(background): patient came to [facility] for cold like symptoms. Drove self and was unaccompanied. Per  staff, patient came around 10am, seemed confused when speaking then fainted. Women behind patient caught him and lowered him to the ground. Denied head strike. Patient then woke and did not remember falling/fainting. Clinic RN was summoned for evaluation.     A-(assessment): /55, P 56, O2 on RA 92%. Patient denied chest pain, SOB, muscular pain, vision changes, dizziness. Able to move all extremities with ease and without pain. Did not seem confused- knew name and why he was in clinic. Stated he did not remember fainted. He reported he does not have hx of syncope. Reported cold like symptoms started 2 days ago and he wanted to come to [facility] for abx. Cough, nasal congestion, sore throat not present. Reported he feels like \"I have gunk in my chest that I dont want to develop in to a cough\". Patient able to get from floor to wheelchair. Vitals obtained after moving from floor to wheelchair. BP 95/55, P 50, O2 on RA 92%.     R-(recommendations): Patient agreeable to ED. Ambulance summoned, patient was agreeable to this.     Reason for Disposition   Fainted > 15 minutes ago and still looks pale (pale skin, pallor)   Age > 50 years    Additional Information   Negative: Still unconscious   Negative: Still feels dizzy or lightheaded   Negative: Difficult to awaken or acting confused (e.g., disoriented, slurred speech)   Negative: Difficulty breathing   Negative: Bluish (or gray) lips or face   Negative: Shock suspected (e.g., cold/pale/clammy skin, too weak to stand, low BP, rapid pulse)   Negative: Bleeding (e.g., vomiting blood, rectal bleeding or tarry stools, severe vaginal bleeding)   Negative: Chest pain   Negative: Extra heartbeats, irregular heart beating, or heart is beating very fast (i.e., 'palpitations')   Negative: Heart " beating < 50 beats per minute OR > 140 beats per minute   Negative: Fainted suddenly after medicine, allergic food or bee sting   Negative: Sounds like a life-threatening emergency to the triager   Negative: Fainted > 15 minutes ago and still feels weak or dizzy   Negative: History of heart problems or congestive heart failure   Negative: Occurred during exercise   Negative: Any head or face injury    Protocols used: [id number]

## 2024-02-14 NOTE — ED NOTES
AIDET performed, white board updated for rounding. Patient updated on plan of care. Patient's pain assessed. Call light within reach, bed in low position, side rails up. Visitor at bedside: none    PT roomed and placed on cardiac monitoring.

## 2024-02-14 NOTE — ED NOTES
EMERGENCY DEPARTMENT ENCOUNTER      NAME: Karen Santiago  AGE: 89 year old male  YOB: 1934  MRN: [id number]  EVALUATION DATE & TIME: 2/14/2024 12:00 PM    PCP: Howard Luu    ED PROVIDER: Denisse Tenorio M.D.        Chief Complaint   Patient presents with    Syncope    Cough         FINAL IMPRESSION:    1. Bronchitis    2. Community acquired pneumonia, unspecified laterality    3. Syncope and collapse          Patient was signed out to me at change of shift by Charles Pisano at 2:30 PM. Please see their note for full HPI, exam and plan.    MEDICAL DECISION MAKING:      Karen Santiago is a 89 year old male with history of COPD, hypertension, CKD, who presents to the ER with complaints of cough and syncope.    Patient was seen by my partner and signed out at change of shift awaiting CT PE results and disposition.  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  He is hemodynamically stable.  Given his syncope though I did speak with him about admission to the hospital for further workup.  He declined.  He understands the risk including cardiac dysrhythmia and even death.  Here he has been stable without dysrhythmia.  Vital signs stable.  He does admit that he had not eaten much or had much fluid intake today and certainly it is possible that some of the syncope could have been orthostatic in nature.  He is not orthostatic here in the ER.  Plan at this time is discharge home per his request.  Antibiotics have been provided.  Patient understands what to watch for and when to return to ER and all of his questions have been answered.    Referral has been sent for patient to follow-up with cardiology given the syncopal episode.  Radiology recommends repeat imaging in 3 to 4 months to ensure resolution at this tumor site.  Patient was provided with a paper copy of the CT report to show primary care for outpatient follow-up.      ED COURSE:  2:30  PM  Patient was signed out to me at change of shift by Charles Pisano. Please see their note for full HPI, exam and plan.    3:45 PM  I updated the patient on all results.  At this time he does not want admission to the hospital for syncope workup.  I explained the risks and benefits of admission and monitoring his heart.  Pneumonia on imaging.  Will treat with antibiotics.  Abnormal changes around an area of prior tumor with radiation.  Provided him with a copy of the CT report to show primary care for outpatient follow-up in 3 to 4 months as recommended by radiologist.  He does not appear toxic or septic.  It certainly possible that his syncope could be some orthostatics as he did not have much oral intake today, but given his age and risk factors could also be a cardiac dysrhythmia.  Discussed this risk with the patient.  He did not feel admission was necessary.  He understood the risks including death, falls which could lead to head bleed or fractures, and other abnormalities.  He continues to want to go home.  I do feel he is able to make that decision.    I do not think that this represents rib fractures, allergic reaction, COPD exacerbation, asthma exacerbation, CHF, myocarditis, pericarditis, endocarditis, ACS, PE, ruptured AAA, pneumothorax, aortic dissection, bowel obstruction, or other such etiologies at this time.    At the conclusion of the encounter I discussed the results of all of the tests and the disposition. Their questions were answered. The patient (and any family present) acknowledged understanding and were agreeable with the care plan.        CONSULTANTS:  Referral to rapid access clinic        MEDICATIONS GIVEN IN THE EMERGENCY:  Medications   sodium chloride 0.9% BOLUS 500 mL (0 mLs Intravenous Stopped 2/14/24 2627)   ipratropium - albuterol 0.5 mg/2.5 mg/3 mL (DUONEB) neb solution 3 mL (3 mLs Nebulization $Given 2/14/24 5977)   iopamidol (ISOVUE-370) solution 75 mL (75 mLs Intravenous $Given  2/14/24 2302)   amoxicillin-clavulanate (AUGMENTIN) 875-125 MG per tablet 1 tablet (1 tablet Oral $Given 2/14/24 1614)   doxycycline hyclate (VIBRAMYCIN) capsule 100 mg (100 mg Oral $Given 2/14/24 1614)             NEW PRESCRIPTIONS STARTED AT TODAY'S ER VISIT     Medication List        Started      amoxicillin-clavulanate 875-125 MG tablet  Commonly known as: AUGMENTIN  1 tablet, Oral, 2 TIMES DAILY     doxycycline hyclate 100 MG capsule  Commonly known as: VIBRAMYCIN  100 mg, Oral, 2 TIMES DAILY            Modified      predniSONE 20 MG tablet  Commonly known as: DELTASONE  40 mg, Oral, DAILY  What changed:   how much to take  additional instructions                  CONDITION:  stable        DISPOSITION:  D.c home (pt declined admission)         =================================================================  =================================================================      Patient was signed out to me at change of shift by Charles Pisano at 2:30 PM. Please see their note for full HPI, exam and plan.        HPI    Kraen Santiago is a 89 year old male with history of COPD, hypertension, CKD, who presents to the ER with complaints of cough and syncope.    Patient had gone to clinic today for his cough and had a witnessed syncopal episode without trauma.  Sent to the ER for evaluation.  He was seen by my partner.  Workup so far unremarkable.  Signed out to me at change of shift with CT PE imaging pending.  Recommendation is if CT is negative then discharge home.    PAST MEDICAL HISTORY:  Past Medical History:   Diagnosis Date    Cancer (H)     bladder    COPD (chronic obstructive pulmonary disease) (H)     Hypertension     Rheumatoid arthritis (H)          PAST SURGICAL HISTORY:  Past Surgical History:   Procedure Laterality Date    HC REMOVAL TESTIS,SIMPLE      Description: Orchiectomy Left;  Recorded: 04/26/2010;  Comments: for benign interstitial tumor    ZZC CYSTECTOMY,ILEAL CONDUIT/SIGMOID BLADDER       Description: Complete Bladder Cystectomy With Ureteroileal Conduit;  Proc Date: 01/01/2002;  Comments: cystoprostatectomy for squamous cell carcinoma per [facility] REMV PROSTATE,RETROPUBIC,SUBTOTAL      Description: Prostatectomy, Retropubic;  Recorded: 05/19/2009;  Comments: 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 Authorizing Provider   acetaminophen 500 MG CAPS Take 1,000 mg by mouth 6/3/21   Reported, Patient   albuterol (PROAIR HFA/PROVENTIL HFA/VENTOLIN HFA) 108 (90 Base) MCG/ACT inhaler INHALE TWO PUFFS BY MOUTH EVERY FOUR HOURS AS NEEDED FOR WHEEZING 3/27/23   Howard Luu PA-C   amLODIPine (NORVASC) 10 MG tablet Take 1 tablet (10 mg) by mouth daily 3/27/23   Howard Luu PA-C   losartan (COZAAR) 100 MG tablet Take 1 tablet (100 mg) by mouth daily 3/27/23   Howard Luu PA-C   mometasone-formoterol (DULERA) 100-5 MCG/ACT inhaler Inhale 2 puffs into the lungs 2 times daily 10/27/23   Howard Luu PA-C   Multiple Vitamins-Iron (MULTI-VITAMIN  /IRON) TABS     Reported, Patient   predniSONE (DELTASONE) 20 MG tablet Take 1 tablet (20 mg) by mouth daily Take 2 tab po d 1-3, then 1 tab po d 4-7, then 1/2 tab po d 8-11, then discontinue 1/2/24   Polina Noe MD   vitamin B complex with vitamin C (STRESS TAB) tablet Take 1 tablet by mouth daily    Reported, Patient   vitamin B-12 (CYANOCOBALAMIN) 100 MCG tablet Take 100 mcg by mouth    Reported, Patient         ALLERGIES:  No Known Allergies      FAMILY HISTORY:  History reviewed. No pertinent family history.      SOCIAL HISTORY:  Social History     Socioeconomic History    Marital status:      Spouse  name: None    Number of children: None    Years of education: None    Highest education level: None   Tobacco Use    Smoking status: Former     Types: Cigarettes     Quit date: 1980     Years since quittin.1     Passive exposure: Past    Smokeless tobacco: Never   Vaping Use    Vaping Use: Never used   Substance and Sexual Activity    Alcohol use: No     Comment: Alcoholic Drinks/day: 2-3 beer per month    Drug use: No     Social Determinants of Health     Financial Resource Strain: Low Risk  (2023)    Financial Resource Strain     Within the past 12 months, have you or your family members you live with been unable to get utilities (heat, electricity) when it was really needed?: No   Food Insecurity: Low Risk  (2023)    Food Insecurity     Within the past 12 months, did you worry that your food would run out before you got money to buy more?: No     Within the past 12 months, did the food you bought just not last and you didn t have money to get more?: No   Transportation Needs: Low Risk  (2023)    Transportation Needs     Within the past 12 months, has lack of transportation kept you from medical appointments, getting your medicines, non-medical meetings or appointments, work, or from getting things that you need?: No   Interpersonal Safety: Low Risk  (2023)    Interpersonal Safety     Do you feel physically and emotionally safe where you currently live?: Yes     Within the past 12 months, have you been hit, slapped, kicked or otherwise physically hurt by someone?: No     Within the past 12 months, have you been humiliated or emotionally abused in other ways by your partner or ex-partner?: No   Housing Stability: Low Risk  (2023)    Housing Stability     Do you have housing? : Yes     Are you worried about losing your housing?: No         VITALS:  Patient Vitals for the past 24 hrs:   BP Temp Temp src Pulse Resp SpO2 Height Weight   24 1621 122/62 -- -- 78 23 98 % -- --  "  02/14/24 1404 -- -- -- 83 18 96 % -- --   02/14/24 1320 136/64 -- -- 84 19 96 % -- --   02/14/24 1240 -- -- -- -- 17 -- -- --   02/14/24 1230 126/59 -- -- 76 17 95 % -- --   02/14/24 1220 127/60 -- -- 77 17 95 % -- --   02/14/24 1210 133/62 -- -- 84 17 97 % -- --   02/14/24 1044 112/55 -- -- -- -- -- -- --   02/14/24 1040 92/54 98  F (36.7  C) Temporal 97 18 92 % 1.778 m (5' 10\") 81.6 kg (180 lb)       Wt Readings from Last 3 Encounters:   02/14/24 81.6 kg (180 lb)   01/01/24 80.3 kg (177 lb)   09/27/23 82.1 kg (181 lb)     Estimated Creatinine Clearance: 57.8 mL/min (based on SCr of 1 mg/dL).      PHYSICAL EXAM    Please see initial providers note for detailed physical exam        LAB:  All pertinent labs reviewed and interpreted.  Recent Results (from the past 24 hour(s))   Glucose by meter    Collection Time: 02/14/24 11:05 AM   Result Value Ref Range    GLUCOSE BY METER POCT 129 (H) 70 - 99 mg/dL   Basic metabolic panel    Collection Time: 02/14/24 11:06 AM   Result Value Ref Range    Sodium 141 135 - 145 mmol/L    Potassium 4.4 3.4 - 5.3 mmol/L    Chloride 106 98 - 107 mmol/L    Carbon Dioxide (CO2) 25 22 - 29 mmol/L    Anion Gap 10 7 - 15 mmol/L    Urea Nitrogen 21.2 8.0 - 23.0 mg/dL    Creatinine 1.00 0.67 - 1.17 mg/dL    GFR Estimate 72 >60 mL/min/1.73m2    Calcium 8.8 8.8 - 10.2 mg/dL    Glucose 142 (H) 70 - 99 mg/dL   INR    Collection Time: 02/14/24 11:06 AM   Result Value Ref Range    INR 0.98 0.85 - 1.15   Partial thromboplastin time    Collection Time: 02/14/24 11:06 AM   Result Value Ref Range    aPTT 30 22 - 38 Seconds   Troponin T, High Sensitivity    Collection Time: 02/14/24 11:06 AM   Result Value Ref Range    Troponin T, High Sensitivity 26 (H) <=22 ng/L   CBC with platelets and differential    Collection Time: 02/14/24 11:06 AM   Result Value Ref Range    WBC Count 11.2 (H) 4.0 - 11.0 10e3/uL    RBC Count 4.08 (L) 4.40 - 5.90 10e6/uL    Hemoglobin 12.6 (L) 13.3 - 17.7 g/dL    Hematocrit 38.2 " "(L) 40.0 - 53.0 %    MCV 94 78 - 100 fL    MCH 30.9 26.5 - 33.0 pg    MCHC 33.0 31.5 - 36.5 g/dL    RDW 14.2 10.0 - 15.0 %    Platelet Count 185 150 - 450 10e3/uL    % Neutrophils 84 %    % Lymphocytes 9 %    % Monocytes 6 %    % Eosinophils 0 %    % Basophils 0 %    % Immature Granulocytes 1 %    NRBCs per 100 WBC 0 <1 /100    Absolute Neutrophils 9.4 (H) 1.6 - 8.3 10e3/uL    Absolute Lymphocytes 1.0 0.8 - 5.3 10e3/uL    Absolute Monocytes 0.7 0.0 - 1.3 10e3/uL    Absolute Eosinophils 0.0 0.0 - 0.7 10e3/uL    Absolute Basophils 0.0 0.0 - 0.2 10e3/uL    Absolute Immature Granulocytes 0.1 <=0.4 10e3/uL    Absolute NRBCs 0.0 10e3/uL   Symptomatic Influenza A/B, RSV, & SARS-CoV2 PCR (COVID-19) Nasopharyngeal    Collection Time: 02/14/24  2:07 PM    Specimen: Nasopharyngeal; Swab   Result Value Ref Range    Influenza A PCR Negative Negative    Influenza B PCR Negative Negative    RSV PCR Negative Negative    SARS CoV2 PCR Negative Negative   CT Chest Pulmonary Embolism w Contrast    Collection Time: 02/14/24  2:08 PM   Result Value Ref Range    Radiologist flags (Urgent)      Follow-up chest CT in 3-4 months suggested to ensure resolution of findings around previously radiated tumor in the left upper lobe.   Troponin T, High Sensitivity (now)    Collection Time: 02/14/24  3:23 PM   Result Value Ref Range    Troponin T, High Sensitivity 19 <=22 ng/L       No results found for: \"ABORH\"        RADIOLOGY:  Reviewed all pertinent imaging. Please see official radiology report.    CT Chest Pulmonary Embolism w Contrast   Final Result   Abnormal   IMPRESSION:   1.  Patient has  an early bronchopneumonia of the left upper and lower lobes  as noted above with peribronchial thickening and areas of retained secretions.  Consider follow-up in 3-4 months to ensure complete resolution.   2.  In the left upper lobe, changes encase the previously radiated tumor with fiducial markers.   3.  Underlying emphysema.   4.  No evidence of " pulmonary emboli.   5.  Other numerous noncritical findings as noted above.         [Access Center: Follow-up chest CT in 3-4 months suggested to ensure resolution of findings around previously radiated tumor in the left upper lobe.]      This report will be copied to the [facility] to ensure a provider acknowledges the finding. [facility] is available Monday through Friday 8am-3:30 pm.                EKG:    none      PROCEDURES:  none        Denisse Tenorio M.D. [facility]  Emergency Medicine and Medical Toxicology  Formerly [facility] EMERGENCY ROOM  [address] [id number] [phone]  Dept: [phone]         Denisse Tenorio MD  02/14/24 4362

## 2024-02-14 NOTE — DISCHARGE INSTRUCTIONS
I understand your desire NOT to stay overnight in the hospital.  Without monitoring your heart more closely though we cannot be sure that this passing out episode is not anything more concerning.  Please follow-up with the cardiology clinic as recommended.  Call tomorrow and make an appointment.    The CT scan shows that  you have pneumonia.  Take the antibiotics as directed and until gone.  Your first dose of antibiotics were given here in the ER today.  You also received a dose of steroids today.  All of your medications will need to be taken again in the morning.    The prescriptions for your antibiotics and the steroid were sent to your [facility] pharmacy in [place].  Please pick these up tonight if possible.    CT scan also shows this pneumonia is around the area where you had your prior tumor and radiation treatment.  Discussed this with your family doctor when you see them later this month.  You may want to have a repeat CT scan in a few months to be sure this pneumonia goes away completely.    Please return to the emergency department if you experience dizziness, feel like you can pass out, do pass out, worsening shortness of breath, fever, or any other concerns.    Thank you for choosing [facility].  It has been my pleasure caring for you today.     ~Dr. Jona MD

## 2024-02-14 NOTE — ED TRIAGE NOTES
PT presents  via EMS after witnessed syncope while standing at the clinic. Was at the clinic for evaluation of productive cough. History of COPD. Doesn't think he hit is head. Not on blood thinners. Feels fine now per patient.      Triage Assessment (Adult)       Row Name 02/14/24 1042          Triage Assessment    Airway WDL WDL        Respiratory WDL    Respiratory WDL X;cough     Cough Frequency frequent     Cough Type productive        Skin Circulation/Temperature WDL    Skin Circulation/Temperature WDL WDL        Cardiac WDL    Cardiac WDL WDL        Peripheral/Neurovascular WDL    Peripheral Neurovascular WDL WDL        Cognitive/Neuro/Behavioral WDL    Cognitive/Neuro/Behavioral WDL WDL

## 2024-02-14 NOTE — ED PROVIDER NOTES
EMERGENCY DEPARTMENT ENCOUNTER            IMPRESSION:  COPD  Bronchitis  Syncopal event        MEDICAL DECISION MAKING:  It was my pleasure to provide care for Karen Santiago was brought in by ambulance from clinic.  He has a history of COPD.  He was seen in clinic today for respiratory symptoms and had a witnessed syncopal event.  No evidence of trauma    On my exam patient is pleasant and cooperative.   Vital signs are normal.  Physical exam notable for diminished breath sounds bilaterally.  No evidence of trauma.     IV fluid, nebulizer treatment and steroid administered for symptom relief.  Patient's symptoms improved.     EKG independently interpreted does not show ischemia or arrhythmia    Laboratory investigation independently interpreted and notable for unremarkable CBC chemistry    CT imaging of the chest has been ordered.      Patient signed out to the evening provider      =================================================================  CHIEF COMPLAINT:  Chief Complaint   Patient presents with    Syncope    Cough         HPI  Karen Santiago is a 89 year old male with a history of hypertension, COPD, and chronic kidney disease stage 3a who presents to the ED by EMS for evaluation of syncope.    Per chart review, the patient presented to [facility] ED on 1/1/2024 for evaluation of COPD exacerbation. His initial troponin was just above normal, improved upon discharge. Chest xray showed no infiltrate, pleural effusion, or pneumothorax. ECG showed sinus rhythm. The patient was discharged with doxycycline and prednisone. Recommended PCP follow-up.    The patient reports a productive cough for the past few days that is worsened when he lays flat. He presented to Clinic this morning and, while there, had a syncopal episode. He is unsure why this happened, but believes it's associated with being dehydrated and feeling generally unwell. He does not endorse any shortness of breath or wheezing. No other  concerns reported at this time.    REVIEW OF SYSTEMS  Constitutional: Does not report chills, unintentional weight loss or fatigue   Eyes: Does not report visual changes or discharge    HENT: Does not report sore throat, ear pain or neck pain  Respiratory: Does not report shortness of breath or wheezing. Positive for a productive cough.    Cardiovascular: Does not report chest pain, palpitations or leg swelling  GI: Does not report abdominal pain, nausea, vomiting, or dark, bloody stools.    : Does not report hematuria, dysuria, or flank pain  Musculoskeletal: Does not report any new musculoskeletal pain or new muscle/joint pains  Skin: Does not report rash or wound  Neurologic: Does not report current headache, new weakness, focal weakness, or sensory changes. Positive for syncope.    Remainder of systems reviewed, unless noted in HPI all others negative.      PAST MEDICAL HISTORY:  Past Medical History:   Diagnosis Date    Cancer (H)     bladder    COPD (chronic obstructive pulmonary disease) (H)     Hypertension     Rheumatoid arthritis (H)        PAST SURGICAL HISTORY:  Past Surgical History:   Procedure Laterality Date    HC REMOVAL TESTIS,SIMPLE      Description: Orchiectomy Left;  Recorded: 04/26/2010;  Comments: for benign interstitial tumor    [facility] CYSTECTOMY,ILEAL CONDUIT/SIGMOID BLADDER      Description: Complete Bladder Cystectomy With Ureteroileal Conduit;  Proc Date: 01/01/2002;  Comments: cystoprostatectomy for squamous cell carcinoma per [facility] REMV PROSTATE,RETROPUBIC,SUBTOTAL      Description: Prostatectomy, Retropubic;  Recorded: 05/19/2009;  Comments: 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 per [facility] TOTAL HIP ARTHROPLASTY      Description: Total Hip Replacement;  Proc Date: 10/17/2012;    RUST REPAIR UMBILICAL IRLANDA,<4Y/O,REDUC      Description: Umbilical Hernia Repair;  Recorded: 04/26/2010;         CURRENT MEDICATIONS:    acetaminophen 500 MG CAPS  albuterol (PROAIR  HFA/PROVENTIL HFA/VENTOLIN HFA) 108 (90 Base) MCG/ACT inhaler  amLODIPine (NORVASC) 10 MG tablet  losartan (COZAAR) 100 MG tablet  mometasone-formoterol (DULERA) 100-5 MCG/ACT inhaler  Multiple Vitamins-Iron (MULTI-VITAMIN  /IRON) TABS  predniSONE (DELTASONE) 20 MG tablet  vitamin B complex with vitamin C (STRESS TAB) tablet  vitamin B-12 (CYANOCOBALAMIN) 100 MCG tablet        ALLERGIES:  No Known Allergies    FAMILY HISTORY:  History reviewed. No pertinent family history.    SOCIAL HISTORY:   Social History     Socioeconomic History    Marital status:    Tobacco Use    Smoking status: Former     Types: Cigarettes     Quit date: 1980     Years since quittin.1     Passive exposure: Past    Smokeless tobacco: Never   Vaping Use    Vaping Use: Never used   Substance and Sexual Activity    Alcohol use: No     Comment: Alcoholic Drinks/day: 2-3 beer per month    Drug use: No     Social Determinants of Health     Financial Resource Strain: Low Risk  (2023)    Financial Resource Strain     Within the past 12 months, have you or your family members you live with been unable to get utilities (heat, electricity) when it was really needed?: No   Food Insecurity: Low Risk  (2023)    Food Insecurity     Within the past 12 months, did you worry that your food would run out before you got money to buy more?: No     Within the past 12 months, did the food you bought just not last and you didn t have money to get more?: No   Transportation Needs: Low Risk  (2023)    Transportation Needs     Within the past 12 months, has lack of transportation kept you from medical appointments, getting your medicines, non-medical meetings or appointments, work, or from getting things that you need?: No   Interpersonal Safety: Low Risk  (2023)    Interpersonal Safety     Do you feel physically and emotionally safe where you currently live?: Yes     Within the past 12 months, have you been hit, slapped, kicked or  "otherwise physically hurt by someone?: No     Within the past 12 months, have you been humiliated or emotionally abused in other ways by your partner or ex-partner?: No   Housing Stability: Low Risk  (9/27/2023)    Housing Stability     Do you have housing? : Yes     Are you worried about losing your housing?: No       PHYSICAL EXAM:    /64   Pulse 84   Temp 98  F (36.7  C) (Temporal)   Resp 19   Ht 1.778 m (5' 10\")   Wt 81.6 kg (180 lb)   SpO2 96%   BMI 25.83 kg/m      Constitutional: Awake, alert, speaks in full sentence  Head: Normocephalic, atraumatic.  ENT: Mucous membranes are moist.  No pallor.   Eyes: Pupils are reactive.  No discoloration.  Neck: No lymphadenopathy, no stridor, supple, no soft tissue swelling  Chest: No tenderness   Respiratory: Respirations even, unlabored. Lungs clear to ascultation bilaterally, in no acute respiratory distress.  Diminished breath sounds bilaterally  Cardiovascular: Regular rate and rhythm.  Good overall perfusion.  Upper and lower extremity pulses are equal.  GI: Abdomen soft, non-tender to palpation.  No guarding or rebound. Bowel sounds present throughout.   Back: No CVA tenderness.    Musculoskeletal: Moves all 4 extremities equally, full function and capacity no peripheral edema.   Integument: Warm, dry. No rash. No bruising or petechiae.  Neurologic: Alert & oriented x 3. Normal speech.   Psychiatric: Normal mood and affect.  Appropriate judgement.    ED COURSE:  12:40 PM Met with and introduced myself to the patient. Discussed history and plan of care.      Medical Decision Making    History:  Supplemental history from: None  External Record(s) reviewed: External medical records including care everywhere reviewed. Reviewed [facility] ED visit from 1/1/2024.    Work Up:  EKG, laboratory and imaging studies as ordered were independently interpreted by myself.   Broad differential diagnosis considered for syncope  The patient's presentation was of high " complexity.     External consultation:  Discussion of management with another provider: Clinic provider    Complicating factors:  Patient has a complicated past medical history including hypertension, COPD, and chronic kidney disease stage 3a.  Care affected by social determinants of health: Access to primary care    Disposition involved shared decision-making with the patient.      LAB:  Laboratory results were independently reviewed and interpreted  Results for orders placed or performed during the hospital encounter of 02/14/24   Basic metabolic panel   Result Value Ref Range    Sodium 141 135 - 145 mmol/L    Potassium 4.4 3.4 - 5.3 mmol/L    Chloride 106 98 - 107 mmol/L    Carbon Dioxide (CO2) 25 22 - 29 mmol/L    Anion Gap 10 7 - 15 mmol/L    Urea Nitrogen 21.2 8.0 - 23.0 mg/dL    Creatinine 1.00 0.67 - 1.17 mg/dL    GFR Estimate 72 >60 mL/min/1.73m2    Calcium 8.8 8.8 - 10.2 mg/dL    Glucose 142 (H) 70 - 99 mg/dL   Result Value Ref Range    INR 0.98 0.85 - 1.15   Partial thromboplastin time   Result Value Ref Range    aPTT 30 22 - 38 Seconds   Result Value Ref Range    Troponin T, High Sensitivity 26 (H) <=22 ng/L   CBC with platelets and differential   Result Value Ref Range    WBC Count 11.2 (H) 4.0 - 11.0 10e3/uL    RBC Count 4.08 (L) 4.40 - 5.90 10e6/uL    Hemoglobin 12.6 (L) 13.3 - 17.7 g/dL    Hematocrit 38.2 (L) 40.0 - 53.0 %    MCV 94 78 - 100 fL    MCH 30.9 26.5 - 33.0 pg    MCHC 33.0 31.5 - 36.5 g/dL    RDW 14.2 10.0 - 15.0 %    Platelet Count 185 150 - 450 10e3/uL    % Neutrophils 84 %    % Lymphocytes 9 %    % Monocytes 6 %    % Eosinophils 0 %    % Basophils 0 %    % Immature Granulocytes 1 %    NRBCs per 100 WBC 0 <1 /100    Absolute Neutrophils 9.4 (H) 1.6 - 8.3 10e3/uL    Absolute Lymphocytes 1.0 0.8 - 5.3 10e3/uL    Absolute Monocytes 0.7 0.0 - 1.3 10e3/uL    Absolute Eosinophils 0.0 0.0 - 0.7 10e3/uL    Absolute Basophils 0.0 0.0 - 0.2 10e3/uL    Absolute Immature Granulocytes 0.1 <=0.4  10e3/uL    Absolute NRBCs 0.0 10e3/uL   Glucose by meter   Result Value Ref Range    GLUCOSE BY METER POCT 129 (H) 70 - 99 mg/dL         RADIOLOGY:  Radiology reports were independently reviewed and interpreted  CT Chest Pulmonary Embolism w Contrast    (Results Pending)        EKG:    Performed on: 14-Feb-2024, 10:43  HR: 93 bpm  Impression: Sinus rhythm. Normal ECG.  Comparison: When compared with ECG of 1-Jan-2024, no significant change was found.    I have independently reviewed and interpreted the EKG(s) documented above.        MEDICATIONS GIVEN IN THE EMERGENCY:  Medications   sodium chloride 0.9% BOLUS 500 mL (has no administration in time range)   ipratropium - albuterol 0.5 mg/2.5 mg/3 mL (DUONEB) neb solution 3 mL (has no administration in time range)   predniSONE (DELTASONE) tablet 40 mg (has no administration in time range)   iopamidol (ISOVUE-370) solution 75 mL (has no administration in time range)           NEW PRESCRIPTIONS STARTED AT TODAY'S ER VISIT:  New Prescriptions    No medications on file                FINAL DIAGNOSIS:    ICD-10-CM    1. Bronchitis  J40                  NAME: Karen Santiago  AGE: 89 year old male  YOB: 1934  MRN: [id number]  EVALUATION DATE & TIME: 2/14/2024 12:00 PM    PCP: Howard Luu    ED PROVIDER: ANA M León Lauren Lawler, am serving as a scribe to document services personally performed by Dr. Charles Pisano based on my observation and the provider's statements to me. I, Charles Pisano MD attest that Drea Toure is acting in a scribe capacity, has observed my performance of the services and has documented them in accordance with my direction.    Charles Pisano M.D.  Emergency Medicine  [facility] EMERGENCY ROOM  [address] [id number] [phone]  Dept: [phone]  2/14/2024         Charles Pisano MD  02/15/24 [id number]

## 2024-02-19 NOTE — PROGRESS NOTES
Discharge plan according to [facility]:     01/12/24 1624   Discharge Planning   Patient/Family Anticipates Transition to home   Concerns to be Addressed all concerns addressed in this encounter   Living Arrangements   People in Home alone   Type of Residence Private Residence   Is your private residence a single family home or apartment? Single family home   Stair Railings, Within Home, Primary railings safe and in good condition   Once home, are you able to live on one level? Yes   Which level? Main Level   Bathroom Shower/Tub Tub/Shower unit   Equipment Currently Used at Home none   Support System   Support Systems Children   Do you have someone available to stay with you one or two nights once you are home? Yes

## 2024-02-22 ENCOUNTER — OFFICE VISIT (OUTPATIENT)
Dept: CARDIOLOGY | Facility: CLINIC | Age: 89
End: 2024-02-22
Payer: COMMERCIAL

## 2024-02-22 VITALS
RESPIRATION RATE: 20 BRPM | DIASTOLIC BLOOD PRESSURE: 62 MMHG | HEIGHT: 71 IN | WEIGHT: 176 LBS | BODY MASS INDEX: 24.64 KG/M2 | SYSTOLIC BLOOD PRESSURE: 132 MMHG | HEART RATE: 80 BPM

## 2024-02-22 DIAGNOSIS — M17.11 PRIMARY OSTEOARTHRITIS OF RIGHT KNEE: ICD-10-CM

## 2024-02-22 DIAGNOSIS — R55 SYNCOPE AND COLLAPSE: ICD-10-CM

## 2024-02-22 DIAGNOSIS — I10 ESSENTIAL HYPERTENSION, BENIGN: ICD-10-CM

## 2024-02-22 DIAGNOSIS — Z01.810 PRE-OPERATIVE CARDIOVASCULAR EXAMINATION: Primary | ICD-10-CM

## 2024-02-22 PROCEDURE — 99204 OFFICE O/P NEW MOD 45 MIN: CPT | Performed by: STUDENT IN AN ORGANIZED HEALTH CARE EDUCATION/TRAINING PROGRAM

## 2024-02-22 RX ORDER — CHOLECALCIFEROL (VITAMIN D3) 50 MCG
2 TABLET ORAL DAILY
COMMUNITY

## 2024-02-22 NOTE — LETTER
2/22/2024    Howard Luu PA-C  [address]  [facility] [address]    RE: Karen Faithon       Dear Colleague,     I had the pleasure of seeing Karen Santiago in the [facility].    [facility]   [address]  [place] [address]   [email or web address]   OFFICE: [phone]     CARDIOLOGY RAPID ACCESS CLINIC NOTE     Thank you, Howard Collins, for asking the [facility] team to see Mr. Karen Santiago to evaluate syncope.      History of Present Illness   Mr. Karen Santiago is a 89 year old male with a significant past history of HTN, COPD, CKD, who presents for evaluation of a syncopal episode and for preoperative risk stratification.  The patient notes he was in line at urgent care for over an hour, he was sick with pna, had not eaten or drank anything, and passed out.  This is his first episode of syncope.  He denied any associated symptoms.  He notes he will be getting a knee surgery next month.  He is limited by his knee and COPD.  He walks slowly up the stairs, walks only to his mail box and back.  He denies any symptoms with these activities.      Other than noted above, Mr. Santiago denies any chest pain/pressure/tightness, shortness of breath at rest or with exertion, light headedness/dizziness, pre-syncope, syncope, lower extremity swelling, palpitations, paroxysmal nocturnal dyspnea (PND), or orthopnea.       Medications  Allergies   Current Outpatient Medications   Medication Sig Dispense Refill    acetaminophen 500 MG CAPS Take 1,000 mg by mouth      albuterol (PROAIR HFA/PROVENTIL HFA/VENTOLIN HFA) 108 (90 Base) MCG/ACT inhaler INHALE TWO PUFFS BY MOUTH EVERY FOUR HOURS AS NEEDED FOR WHEEZING 8.5 g 5    amLODIPine (NORVASC) 10 MG tablet Take 1 tablet (10 mg) by mouth daily 90 tablet 3    losartan (COZAAR) 100 MG tablet Take 1 tablet (100 mg) by mouth daily 90 tablet 3     mometasone-formoterol (DULERA) 100-5 MCG/ACT inhaler Inhale 2 puffs into the lungs 2 times daily 13 g 5    Multiple Vitamins-Iron (MULTI-VITAMIN  /IRON) TABS       vitamin B complex with vitamin C (STRESS TAB) tablet Take 1 tablet by mouth daily      vitamin B-12 (CYANOCOBALAMIN) 100 MCG tablet Take 100 mcg by mouth        No Known Allergies     Physical Examination Review of Systems   Vitals: There were no vitals taken for this visit.  BMI= There is no height or weight on file to calculate BMI.  Wt Readings from Last 3 Encounters:   24 81.6 kg (180 lb)   24 80.3 kg (177 lb)   23 82.1 kg (181 lb)       General: pleasant male. No acute distress.   Neck: No JVD  Lungs: clear to auscultation  COR:  regular rate and rhythm, No murmurs, rubs, or gallops  Extrem: No edema        Please refer above for cardiac ROS details.       Past History     Family History: No family history on file.     Social History:   Social History     Socioeconomic History    Marital status:      Spouse name: Not on file    Number of children: Not on file    Years of education: Not on file    Highest education level: Not on file   Occupational History    Not on file   Tobacco Use    Smoking status: Former     Types: Cigarettes     Quit date: 1980     Years since quittin.1     Passive exposure: Past    Smokeless tobacco: Never   Vaping Use    Vaping Use: Never used   Substance and Sexual Activity    Alcohol use: No     Comment: Alcoholic Drinks/day: 2-3 beer per month    Drug use: No    Sexual activity: Not on file   Other Topics Concern    Not on file   Social History Narrative    Not on file     Social Determinants of Health     Financial Resource Strain: Low Risk  (2023)    Financial Resource Strain     Within the past 12 months, have you or your family members you live with been unable to get utilities (heat, electricity) when it was really needed?: No   Food Insecurity: Low Risk  (2023)    Food  Insecurity     Within the past 12 months, did you worry that your food would run out before you got money to buy more?: No     Within the past 12 months, did the food you bought just not last and you didn t have money to get more?: No   Transportation Needs: Low Risk  (9/27/2023)    Transportation Needs     Within the past 12 months, has lack of transportation kept you from medical appointments, getting your medicines, non-medical meetings or appointments, work, or from getting things that you need?: No   Physical Activity: Not on file   Stress: Not on file   Social Connections: Not on file   Interpersonal Safety: Low Risk  (9/27/2023)    Interpersonal Safety     Do you feel physically and emotionally safe where you currently live?: Yes     Within the past 12 months, have you been hit, slapped, kicked or otherwise physically hurt by someone?: No     Within the past 12 months, have you been humiliated or emotionally abused in other ways by your partner or ex-partner?: No   Housing Stability: Low Risk  (9/27/2023)    Housing Stability     Do you have housing? : Yes     Are you worried about losing your housing?: No            Lab Results    Chemistry/lipid CBC Cardiac Enzymes/BNP/TSH/INR   Lab Results   Component Value Date    CHOL 187 03/27/2023    HDL 50 03/27/2023    TRIG 90 03/27/2023    BUN 21.2 02/14/2024     02/14/2024    CO2 25 02/14/2024    Lab Results   Component Value Date    WBC 11.2 (H) 02/14/2024    HGB 12.6 (L) 02/14/2024    HCT 38.2 (L) 02/14/2024    MCV 94 02/14/2024     02/14/2024    Lab Results   Component Value Date    TROPONINI <0.01 06/23/2021    INR 0.98 02/14/2024          Cardiac Problems and Cardiac Diagnostics     Most Recent Cardiac testing:  ECG dated 2/14/2024 (personaly reviewed and interpreted): NSR           Assessment/Recommendations   Assessment:    Mr. Karen Santiago is a 89 year old male with a significant past history of HTN, COPD, CKD, who presents for evaluation  of a syncopal episode and for preoperative risk stratification.      Preoperative risk stratification   - The patient has a limited functional capacity and can not achieve > 4 METs.  He gets dyspnea with activity which may be related to COPD and deconditioning.  Will proceed with a NM vasodilator stress test to rule out high risk CAD   - ECG reassuring   - Reviewed CT scan which showed CAC in the LAD, Lcx, and RCA.    Syncope   - Likely related to dehydration, prolonged standing, etc.     - TTE to rule out structural cardiac issues   - ECG reassuring    RTC to be decided on testing           Perez Carlos DO [facility]  Non-invasive Cardiologist  [facility]         Thank you for allowing me to participate in the care of your patient.      Sincerely,     Perez Carlos DO     [facility]  cc:   Denisse Tenorio MD  [address] [place] [address]

## 2024-02-22 NOTE — PROGRESS NOTES
[facility] HEART CARE   [address]  [place] [address]   [email or web address]   OFFICE: [phone]     CARDIOLOGY RAPID ACCESS CLINIC NOTE     Thank you, Howard Collins, for asking the [facility] Heart Care team to see Mr. Karen Santiago to evaluate syncope.      History of Present Illness   Mr. Karen Santiago is a 89 year old male with a significant past history of HTN, COPD, CKD, who presents for evaluation of a syncopal episode and for preoperative risk stratification.  The patient notes he was in line at urgent care for over an hour, he was sick with pna, had not eaten or drank anything, and passed out.  This is his first episode of syncope.  He denied any associated symptoms.  He notes he will be getting a knee surgery next month.  He is limited by his knee and COPD.  He walks slowly up the stairs, walks only to his mail box and back.  He denies any symptoms with these activities.      Other than noted above, Mr. Santiago denies any chest pain/pressure/tightness, shortness of breath at rest or with exertion, light headedness/dizziness, pre-syncope, syncope, lower extremity swelling, palpitations, paroxysmal nocturnal dyspnea (PND), or orthopnea.       Medications  Allergies   Current Outpatient Medications   Medication Sig Dispense Refill    acetaminophen 500 MG CAPS Take 1,000 mg by mouth      albuterol (PROAIR HFA/PROVENTIL HFA/VENTOLIN HFA) 108 (90 Base) MCG/ACT inhaler INHALE TWO PUFFS BY MOUTH EVERY FOUR HOURS AS NEEDED FOR WHEEZING 8.5 g 5    amLODIPine (NORVASC) 10 MG tablet Take 1 tablet (10 mg) by mouth daily 90 tablet 3    losartan (COZAAR) 100 MG tablet Take 1 tablet (100 mg) by mouth daily 90 tablet 3    mometasone-formoterol (DULERA) 100-5 MCG/ACT inhaler Inhale 2 puffs into the lungs 2 times daily 13 g 5    Multiple Vitamins-Iron (MULTI-VITAMIN  /IRON) TABS       vitamin B complex with vitamin C (STRESS TAB) tablet Take 1 tablet by  mouth daily      vitamin B-12 (CYANOCOBALAMIN) 100 MCG tablet Take 100 mcg by mouth        No Known Allergies     Physical Examination Review of Systems   Vitals: There were no vitals taken for this visit.  BMI= There is no height or weight on file to calculate BMI.  Wt Readings from Last 3 Encounters:   24 81.6 kg (180 lb)   24 80.3 kg (177 lb)   23 82.1 kg (181 lb)       General: pleasant male. No acute distress.   Neck: No JVD  Lungs: clear to auscultation  COR:  regular rate and rhythm, No murmurs, rubs, or gallops  Extrem: No edema        Please refer above for cardiac ROS details.       Past History     Family History: No family history on file.     Social History:   Social History     Socioeconomic History    Marital status:      Spouse name: Not on file    Number of children: Not on file    Years of education: Not on file    Highest education level: Not on file   Occupational History    Not on file   Tobacco Use    Smoking status: Former     Types: Cigarettes     Quit date: 1980     Years since quittin.1     Passive exposure: Past    Smokeless tobacco: Never   Vaping Use    Vaping Use: Never used   Substance and Sexual Activity    Alcohol use: No     Comment: Alcoholic Drinks/day: 2-3 beer per month    Drug use: No    Sexual activity: Not on file   Other Topics Concern    Not on file   Social History Narrative    Not on file     Social Determinants of Health     Financial Resource Strain: Low Risk  (2023)    Financial Resource Strain     Within the past 12 months, have you or your family members you live with been unable to get utilities (heat, electricity) when it was really needed?: No   Food Insecurity: Low Risk  (2023)    Food Insecurity     Within the past 12 months, did you worry that your food would run out before you got money to buy more?: No     Within the past 12 months, did the food you bought just not last and you didn t have money to get more?: No    Transportation Needs: Low Risk  (9/27/2023)    Transportation Needs     Within the past 12 months, has lack of transportation kept you from medical appointments, getting your medicines, non-medical meetings or appointments, work, or from getting things that you need?: No   Physical Activity: Not on file   Stress: Not on file   Social Connections: Not on file   Interpersonal Safety: Low Risk  (9/27/2023)    Interpersonal Safety     Do you feel physically and emotionally safe where you currently live?: Yes     Within the past 12 months, have you been hit, slapped, kicked or otherwise physically hurt by someone?: No     Within the past 12 months, have you been humiliated or emotionally abused in other ways by your partner or ex-partner?: No   Housing Stability: Low Risk  (9/27/2023)    Housing Stability     Do you have housing? : Yes     Are you worried about losing your housing?: No            Lab Results    Chemistry/lipid CBC Cardiac Enzymes/BNP/TSH/INR   Lab Results   Component Value Date    CHOL 187 03/27/2023    HDL 50 03/27/2023    TRIG 90 03/27/2023    BUN 21.2 02/14/2024     02/14/2024    CO2 25 02/14/2024    Lab Results   Component Value Date    WBC 11.2 (H) 02/14/2024    HGB 12.6 (L) 02/14/2024    HCT 38.2 (L) 02/14/2024    MCV 94 02/14/2024     02/14/2024    Lab Results   Component Value Date    TROPONINI <0.01 06/23/2021    INR 0.98 02/14/2024          Cardiac Problems and Cardiac Diagnostics     Most Recent Cardiac testing:  ECG dated 2/14/2024 (personaly reviewed and interpreted): NSR           Assessment/Recommendations   Assessment:    Mr. Karen Santiago is a 89 year old male with a significant past history of HTN, COPD, CKD, who presents for evaluation of a syncopal episode and for preoperative risk stratification.      Preoperative risk stratification   - The patient has a limited functional capacity and can not achieve > 4 METs.  He gets dyspnea with activity which may be related  to COPD and deconditioning.  Will proceed with a NM vasodilator stress test to rule out high risk CAD   - ECG reassuring   - Reviewed CT scan which showed CAC in the LAD, Lcx, and RCA.    Syncope   - Likely related to dehydration, prolonged standing, etc.     - TTE to rule out structural cardiac issues   - ECG reassuring    RTC to be decided on testing    Addendum 3/7/2024:  Reviewed echocardiogram and NM stress.  No high risk disease on NM stress, possible prior infarct vs. Diaphragmatic attenuation artifact.  Either way, no high risk findings that would impact surgical risk.  Echocardiogram showed normal function which is reassuring.  May proceed with knee surgery without any additional testing.           Perez Carlos, DO [facility]  Non-invasive Cardiologist  [facility]

## 2024-02-22 NOTE — PATIENT INSTRUCTIONS
It was a pleasure meeting you today     Get an echocardiogram to evaluate the heart structure and function  Get a nuclear stress test to evaluate your candidacy for surgery  We will leave follow up open ended    Perez Carlos DO [facility]  Non-invasive Cardiologist  [facility]

## 2024-03-06 ENCOUNTER — HOSPITAL ENCOUNTER (OUTPATIENT)
Dept: NUCLEAR MEDICINE | Facility: CLINIC | Age: 89
Discharge: HOME | End: 2024-03-06
Attending: STUDENT IN AN ORGANIZED HEALTH CARE EDUCATION/TRAINING PROGRAM
Payer: COMMERCIAL

## 2024-03-06 ENCOUNTER — HOSPITAL ENCOUNTER (OUTPATIENT)
Dept: CARDIOLOGY | Facility: CLINIC | Age: 89
Discharge: HOME | End: 2024-03-06
Attending: STUDENT IN AN ORGANIZED HEALTH CARE EDUCATION/TRAINING PROGRAM
Payer: COMMERCIAL

## 2024-03-06 DIAGNOSIS — Z01.810 PRE-OPERATIVE CARDIOVASCULAR EXAMINATION: ICD-10-CM

## 2024-03-06 DIAGNOSIS — R55 SYNCOPE AND COLLAPSE: ICD-10-CM

## 2024-03-06 LAB
CV STRESS CURRENT BP HE: NORMAL
CV STRESS CURRENT HR HE: 74
CV STRESS CURRENT HR HE: 75
CV STRESS CURRENT HR HE: 79
CV STRESS CURRENT HR HE: 80
CV STRESS CURRENT HR HE: 80
CV STRESS CURRENT HR HE: 82
CV STRESS CURRENT HR HE: 83
CV STRESS CURRENT HR HE: 83
CV STRESS CURRENT HR HE: 84
CV STRESS CURRENT HR HE: 87
CV STRESS CURRENT HR HE: 87
CV STRESS CURRENT HR HE: 88
CV STRESS CURRENT HR HE: 88
CV STRESS CURRENT HR HE: 90
CV STRESS CURRENT HR HE: 90
CV STRESS CURRENT HR HE: 91
CV STRESS CURRENT HR HE: 91
CV STRESS CURRENT HR HE: 92
CV STRESS DEVIATION TIME HE: NORMAL
CV STRESS ECHO PERCENT HR HE: NORMAL
CV STRESS EXERCISE STAGE HE: NORMAL
CV STRESS FINAL RESTING BP HE: NORMAL
CV STRESS FINAL RESTING HR HE: 80
CV STRESS MAX HR HE: 111
CV STRESS MAX TREADMILL GRADE HE: 0
CV STRESS MAX TREADMILL SPEED HE: 0
CV STRESS PEAK DIA BP HE: NORMAL
CV STRESS PEAK SYS BP HE: NORMAL
CV STRESS PHASE HE: NORMAL
CV STRESS PROTOCOL HE: NORMAL
CV STRESS RESTING PT POSITION HE: NORMAL
CV STRESS ST DEVIATION AMOUNT HE: NORMAL
CV STRESS ST DEVIATION ELEVATION HE: NORMAL
CV STRESS ST EVELATION AMOUNT HE: NORMAL
CV STRESS TEST TYPE HE: NORMAL
CV STRESS TOTAL STAGE TIME MIN 1 HE: NORMAL
LVEF ECHO: NORMAL
NUC STRESS EJECTION FRACTION: 66 %
RATE PRESSURE PRODUCT: NORMAL
STRESS ECHO BASELINE DIASTOLIC HE: 56
STRESS ECHO BASELINE HR: 75
STRESS ECHO BASELINE SYSTOLIC BP: 117
STRESS ECHO CALCULATED PERCENT HR: 85 %
STRESS ECHO LAST STRESS DIASTOLIC BP: 53
STRESS ECHO LAST STRESS HR: 90
STRESS ECHO LAST STRESS SYSTOLIC BP: 97
STRESS ECHO TARGET HR: 131
STRESS/REST PERFUSION RATIO: 1.38

## 2024-03-06 PROCEDURE — 93306 TTE W/DOPPLER COMPLETE: CPT

## 2024-03-06 PROCEDURE — 93306 TTE W/DOPPLER COMPLETE: CPT | Mod: 26 | Performed by: INTERNAL MEDICINE

## 2024-03-06 PROCEDURE — 93018 CV STRESS TEST I&R ONLY: CPT | Performed by: INTERNAL MEDICINE

## 2024-03-06 PROCEDURE — 78452 HT MUSCLE IMAGE SPECT MULT: CPT

## 2024-03-06 PROCEDURE — 343N000001 HC RX 343: Performed by: STUDENT IN AN ORGANIZED HEALTH CARE EDUCATION/TRAINING PROGRAM

## 2024-03-06 PROCEDURE — A9500 TC99M SESTAMIBI: HCPCS | Performed by: STUDENT IN AN ORGANIZED HEALTH CARE EDUCATION/TRAINING PROGRAM

## 2024-03-06 PROCEDURE — 93016 CV STRESS TEST SUPVJ ONLY: CPT | Performed by: GENERAL ACUTE CARE HOSPITAL

## 2024-03-06 PROCEDURE — 78452 HT MUSCLE IMAGE SPECT MULT: CPT | Mod: 26 | Performed by: INTERNAL MEDICINE

## 2024-03-06 PROCEDURE — 250N000011 HC RX IP 250 OP 636: Mod: JZ | Performed by: STUDENT IN AN ORGANIZED HEALTH CARE EDUCATION/TRAINING PROGRAM

## 2024-03-06 PROCEDURE — 93017 CV STRESS TEST TRACING ONLY: CPT

## 2024-03-06 RX ORDER — AMINOPHYLLINE 25 MG/ML
50 INJECTION, SOLUTION INTRAVENOUS
Status: DISCONTINUED | OUTPATIENT
Start: 2024-03-06 | End: 2024-03-06 | Stop reason: HOSPADM

## 2024-03-06 RX ORDER — REGADENOSON 0.08 MG/ML
0.4 INJECTION, SOLUTION INTRAVENOUS ONCE
Status: COMPLETED | OUTPATIENT
Start: 2024-03-06 | End: 2024-03-06

## 2024-03-06 RX ADMIN — Medication 8.2 MILLICURIE: at 09:20

## 2024-03-06 RX ADMIN — REGADENOSON 0.4 MG: 0.08 INJECTION, SOLUTION INTRAVENOUS at 09:56

## 2024-03-06 RX ADMIN — Medication 30.5 MILLICURIE: at 10:00

## 2024-03-06 RX ADMIN — AMINOPHYLLINE 50 MG: 25 INJECTION, SOLUTION INTRAVENOUS at 10:00

## 2024-03-06 NOTE — PROGRESS NOTES
Pt went to urgent care and had syncope waiting in line and only time this has happen and pt is also waiting for pre-op knee replacement.  Denies shortness of breath or chest pain.  Geeta Bess RN

## 2024-03-08 DIAGNOSIS — I10 ESSENTIAL HYPERTENSION, BENIGN: Primary | ICD-10-CM

## 2024-03-12 ENCOUNTER — OFFICE VISIT (OUTPATIENT)
Dept: FAMILY MEDICINE | Facility: CLINIC | Age: 89
End: 2024-03-12
Payer: COMMERCIAL

## 2024-03-12 VITALS
SYSTOLIC BLOOD PRESSURE: 120 MMHG | HEIGHT: 71 IN | BODY MASS INDEX: 24.08 KG/M2 | DIASTOLIC BLOOD PRESSURE: 64 MMHG | OXYGEN SATURATION: 96 % | RESPIRATION RATE: 14 BRPM | TEMPERATURE: 98.4 F | HEART RATE: 76 BPM | WEIGHT: 172 LBS

## 2024-03-12 DIAGNOSIS — C66.1 MALIGNANT NEOPLASM OF RIGHT URETER (H): ICD-10-CM

## 2024-03-12 DIAGNOSIS — Z01.818 PREOP GENERAL PHYSICAL EXAM: Primary | ICD-10-CM

## 2024-03-12 DIAGNOSIS — M17.11 PRIMARY OSTEOARTHRITIS OF RIGHT KNEE: ICD-10-CM

## 2024-03-12 DIAGNOSIS — E53.8 B12 DEFICIENCY: ICD-10-CM

## 2024-03-12 DIAGNOSIS — I10 ESSENTIAL HYPERTENSION, BENIGN: ICD-10-CM

## 2024-03-12 DIAGNOSIS — E78.5 HYPERLIPIDEMIA LDL GOAL <100: ICD-10-CM

## 2024-03-12 DIAGNOSIS — C67.9 MALIGNANT NEOPLASM OF URINARY BLADDER, UNSPECIFIED SITE (H): ICD-10-CM

## 2024-03-12 DIAGNOSIS — C34.12 MALIGNANT NEOPLASM OF UPPER LOBE OF LEFT LUNG (H): ICD-10-CM

## 2024-03-12 DIAGNOSIS — Z93.6 S/P ILEAL CONDUIT (H): ICD-10-CM

## 2024-03-12 DIAGNOSIS — D50.9 IRON DEFICIENCY ANEMIA, UNSPECIFIED IRON DEFICIENCY ANEMIA TYPE: ICD-10-CM

## 2024-03-12 DIAGNOSIS — N18.31 CHRONIC KIDNEY DISEASE, STAGE 3A (H): ICD-10-CM

## 2024-03-12 DIAGNOSIS — J43.9 PULMONARY EMPHYSEMA, UNSPECIFIED EMPHYSEMA TYPE (H): ICD-10-CM

## 2024-03-12 LAB
ERYTHROCYTE [DISTWIDTH] IN BLOOD BY AUTOMATED COUNT: 13.6 % (ref 10–15)
HCT VFR BLD AUTO: 38.3 % (ref 40–53)
HGB BLD-MCNC: 12.6 G/DL (ref 13.3–17.7)
MCH RBC QN AUTO: 31 PG (ref 26.5–33)
MCHC RBC AUTO-ENTMCNC: 32.9 G/DL (ref 31.5–36.5)
MCV RBC AUTO: 94 FL (ref 78–100)
PLATELET # BLD AUTO: 240 10E3/UL (ref 150–450)
RBC # BLD AUTO: 4.07 10E6/UL (ref 4.4–5.9)
WBC # BLD AUTO: 5 10E3/UL (ref 4–11)

## 2024-03-12 PROCEDURE — 82043 UR ALBUMIN QUANTITATIVE: CPT | Performed by: PHYSICIAN ASSISTANT

## 2024-03-12 PROCEDURE — 82570 ASSAY OF URINE CREATININE: CPT | Performed by: PHYSICIAN ASSISTANT

## 2024-03-12 PROCEDURE — 80053 COMPREHEN METABOLIC PANEL: CPT | Performed by: PHYSICIAN ASSISTANT

## 2024-03-12 PROCEDURE — 99214 OFFICE O/P EST MOD 30 MIN: CPT | Performed by: PHYSICIAN ASSISTANT

## 2024-03-12 PROCEDURE — 82607 VITAMIN B-12: CPT | Performed by: PHYSICIAN ASSISTANT

## 2024-03-12 PROCEDURE — 85027 COMPLETE CBC AUTOMATED: CPT | Performed by: PHYSICIAN ASSISTANT

## 2024-03-12 PROCEDURE — 36415 COLL VENOUS BLD VENIPUNCTURE: CPT | Performed by: PHYSICIAN ASSISTANT

## 2024-03-12 PROCEDURE — 83540 ASSAY OF IRON: CPT | Performed by: PHYSICIAN ASSISTANT

## 2024-03-12 PROCEDURE — 83550 IRON BINDING TEST: CPT | Performed by: PHYSICIAN ASSISTANT

## 2024-03-12 PROCEDURE — 80061 LIPID PANEL: CPT | Performed by: PHYSICIAN ASSISTANT

## 2024-03-12 RX ORDER — RESPIRATORY SYNCYTIAL VIRUS VACCINE 120MCG/0.5
0.5 KIT INTRAMUSCULAR ONCE
Qty: 1 EACH | Refills: 0 | Status: CANCELLED | OUTPATIENT
Start: 2024-03-12 | End: 2024-03-12

## 2024-03-12 ASSESSMENT — ENCOUNTER SYMPTOMS
FEVER: 0
PALPITATIONS: 0
SHORTNESS OF BREATH: 0
EYE PAIN: 0
FREQUENCY: 0
SORE THROAT: 0
BRUISES/BLEEDS EASILY: 0
HEADACHES: 0
ABDOMINAL PAIN: 0
CONSTIPATION: 0
NERVOUS/ANXIOUS: 0
ALLERGIC/IMMUNOLOGIC NEGATIVE: 1
DIZZINESS: 0
DYSURIA: 0
WEAKNESS: 0
PARESTHESIAS: 0
DIARRHEA: 0
ARTHRALGIAS: 0
COUGH: 0
CHILLS: 0
MYALGIAS: 0

## 2024-03-12 NOTE — LETTER
March 14, 2024      Karen Santiago  [address] [facility] [address]        Dear ,    We are writing to inform you of your test results.      Please send result letter   Complete Blood Count: White blood cell, red blood cell, platelets, and hemoglobin are stable.   Metabolic panel : Kidney function, liver function, electrolytes are stable.    Lipids: Lipid panel is stable   Iron is a little low. Would restart iron supplement daily to help with this.   B12 is within normal limits.     Resulted Orders   CBC with platelets   Result Value Ref Range    WBC Count 5.0 4.0 - 11.0 10e3/uL    RBC Count 4.07 (L) 4.40 - 5.90 10e6/uL    Hemoglobin 12.6 (L) 13.3 - 17.7 g/dL    Hematocrit 38.3 (L) 40.0 - 53.0 %    MCV 94 78 - 100 fL    MCH 31.0 26.5 - 33.0 pg    MCHC 32.9 31.5 - 36.5 g/dL    RDW 13.6 10.0 - 15.0 %    Platelet Count 240 150 - 450 10e3/uL   Comprehensive metabolic panel (BMP + Alb, Alk Phos, ALT, AST, Total. Bili, TP)   Result Value Ref Range    Sodium 140 135 - 145 mmol/L      Comment:      Reference intervals for this test were updated on 09/26/2023 to more accurately reflect our healthy population. There may be differences in the flagging of prior results with similar values performed with this method. Interpretation of those prior results can be made in the context of the updated reference intervals.     Potassium 4.4 3.4 - 5.3 mmol/L    Carbon Dioxide (CO2) 25 22 - 29 mmol/L    Anion Gap 10 7 - 15 mmol/L    Urea Nitrogen 14.9 8.0 - 23.0 mg/dL    Creatinine 0.91 0.67 - 1.17 mg/dL    GFR Estimate 81 >60 mL/min/1.73m2    Calcium 9.5 8.8 - 10.2 mg/dL    Chloride 105 98 - 107 mmol/L    Glucose 109 (H) 70 - 99 mg/dL    Alkaline Phosphatase 70 40 - 150 U/L      Comment:      Reference intervals for this test were updated on 11/14/2023 to more accurately reflect our healthy population. There may be differences in the flagging of prior results with similar values performed with this method.  Interpretation of those prior results can be made in the context of the updated reference intervals.    AST 19 0 - 45 U/L      Comment:      Reference intervals for this test were updated on 6/12/2023 to more accurately reflect our healthy population. There may be differences in the flagging of prior results with similar values performed with this method. Interpretation of those prior results can be made in the context of the updated reference intervals.    ALT 12 0 - 70 U/L      Comment:      Reference intervals for this test were updated on 6/12/2023 to more accurately reflect our healthy population. There may be differences in the flagging of prior results with similar values performed with this method. Interpretation of those prior results can be made in the context of the updated reference intervals.      Protein Total 6.6 6.4 - 8.3 g/dL    Albumin 4.1 3.5 - 5.2 g/dL    Bilirubin Total 0.3 <=1.2 mg/dL   Lipid panel reflex to direct LDL Fasting   Result Value Ref Range    Cholesterol 167 <200 mg/dL    Triglycerides 103 <150 mg/dL    Direct Measure HDL 39 (L) >=40 mg/dL    LDL Cholesterol Calculated 107 (H) <=100 mg/dL    Non HDL Cholesterol 128 <130 mg/dL    Patient Fasting > 8hrs? Yes     Narrative    Cholesterol  Desirable:  <200 mg/dL    Triglycerides  Normal:  Less than 150 mg/dL  Borderline High:  150-199 mg/dL  High:  200-499 mg/dL  Very High:  Greater than or equal to 500 mg/dL    Direct Measure HDL  Female:  Greater than or equal to 50 mg/dL   Male:  Greater than or equal to 40 mg/dL    LDL Cholesterol  Desirable:  <100mg/dL  Above Desirable:  100-129 mg/dL   Borderline High:  130-159 mg/dL   High:  160-189 mg/dL   Very High:  >= 190 mg/dL    Non HDL Cholesterol  Desirable:  130 mg/dL  Above Desirable:  130-159 mg/dL  Borderline High:  160-189 mg/dL  High:  190-219 mg/dL  Very High:  Greater than or equal to 220 mg/dL   Vitamin B12   Result Value Ref Range    Vitamin B12 782 232 - 1,245 pg/mL   Iron and  iron binding capacity   Result Value Ref Range    Iron 38 (L) 61 - 157 ug/dL    Iron Binding Capacity 221 (L) 240 - 430 ug/dL    Iron Sat Index 17 15 - 46 %   Albumin Random Urine Quantitative with Creat Ratio   Result Value Ref Range    Creatinine Urine mg/dL 153.0 mg/dL      Comment:      The reference ranges have not been established in urine creatinine. The results should be integrated into the clinical context for interpretation.    Albumin Urine mg/L 119.0 mg/L      Comment:      The reference ranges have not been established in urine albumin. The results should be integrated into the clinical context for interpretation.    Albumin Urine mg/g Cr 77.78 (H) 0.00 - 17.00 mg/g Cr      Comment:      Microalbuminuria is defined as an albumin:creatinine ratio of 17 to 299 for males and 25 to 299 for females. A ratio of albumin:creatinine of 300 or higher is indicative of overt proteinuria.  Due to biologic variability, positive results should be confirmed by a second, first-morning random or 24-hour timed urine specimen. If there is discrepancy, a third specimen is recommended. When 2 out of 3 results are in the microalbuminuria range, this is evidence for incipient nephropathy and warrants increased efforts at glucose control, blood pressure control, and institution of therapy with an angiotensin-converting-enzyme (ACE) inhibitor (if the patient can tolerate it).         If you have any questions or concerns, please call the clinic at the number listed above.       Sincerely,      Howard Luu PA-C

## 2024-03-12 NOTE — H&P (VIEW-ONLY)
Preoperative Evaluation  [facility]  [address]  [place] [facility] [address]  Phone: [phone]  Fax: [phone]  Primary Provider: Pily Ventura  Pre-op Performing Provider: PILY VENTURA  Mar 12, 2024       Karen is a 89 year old, presenting for the following:  Pre-Op Exam (DOS 3/20/24 for R TKR at [facility] with Dr. Burks)        3/12/2024    10:01 AM   Additional Questions   Roomed by Zuleyka Sheehan   Accompanied by none     Surgical Information  Surgery/Procedure: R TKR  Surgery Location: [facility]  Surgeon: Dr. Burks  Surgery Date: 3/20/24  Time of Surgery: TBD  Where patient plans to recover: At home with family  Fax number for surgical facility: Note does not need to be faxed, will be available electronically in Epic.    Assessment & Plan     The proposed surgical procedure is considered INTERMEDIATE risk.  Preop general physical exam  Primary osteoarthritis of right knee  Drea is a pleasant 89-year-old male who presents to the clinic today for a preop physical.  Will be undergoing total right knee replacement on 3/20/[facility] at [facility] for osteoarthritis of right knee.  Otherwise feeling well today.  Denies any chest pain, shortness of breath, lightheaded or dizziness.  Will update labs today including a CBC and a complete metabolic panel.  - CBC with platelets; Future  - Comprehensive metabolic panel (BMP + Alb, Alk Phos, ALT, AST, Total. Bili, TP); Future    Benign Essential Hypertension  Blood pressure well-controlled at 120/64.  Hold losartan the morning of his procedure    Hyperlipidemia LDL goal <100  Last lipid panel 2/23 showed an , triglyceride of 90, HDL of 50.  Recently did have a syncopal episode in the emergency department and was diagnosed with left-sided pneumonia.  He was also referred to cardiology for further evaluation.  Underwent stress test 3/6/2024 that did show a medium sized  transmural infarct in the mid to basilar segment but no ischemia noted.  Also had an echocardiogram that showed an EF of 55 to 60% with no valvular disease.  Cardiology has since cleared him for this procedure as of 2/22/2024.  He is asymptomatic at this time.  - Lipid panel reflex to direct LDL Fasting; Future    Pulmonary emphysema, unspecified emphysema type (H)  CT chest showed a left upper lobe and left lower lobe bronco pneumonitis.  CT recommended 3 to 4-month follow-up.  He was treated with antibiotics and steroids symptoms have since resolved.  He is not having any worsening symptoms.  Currently on Dulera and albuterol and feels that his symptoms are well-controlled.  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  Diagnosed with ureter cancer in 2002.  No symptoms at this time.  Follows closely with [facility] urology.    Iron deficiency anemia, unspecified iron deficiency anemia type  Previously on iron supplements due to constipation discontinue this and is only taking a multivitamin.  Will check iron levels today  - Iron and iron binding capacity; Future  - Iron and iron binding capacity; Future    Chronic kidney disease, stage 3a (H)  Kidney function has remained stable in the past.  He is not taking any nephrotoxic medications including NSAIDs.  Blood pressure is well-controlled today.  Will check complete metabolic panel and a microalbumin.  He is to stay well-hydrated  - Albumin Random Urine Quantitative with Creat Ratio; Future    B12 deficiency  Continue with B12 supplements.  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  Continues to follow-up with his oncology team annually.  No new symptoms     Follow-up Visit   Expected date:  Sep 12, 2024  (Approximate)      Follow Up Appointment Details:     Follow-up with whom?: Me    Follow-Up for what?: Adult Preventive    How?: In Person                     Current Outpatient Medications   Medication    acetaminophen 500 MG CAPS    albuterol (PROAIR HFA/PROVENTIL HFA/VENTOLIN HFA) 108 (90 Base) MCG/ACT inhaler    amLODIPine (NORVASC) 10 MG tablet    losartan (COZAAR) 100 MG tablet    mometasone-formoterol (DULERA) 100-5 MCG/ACT inhaler    Multiple Vitamins-Iron (MULTI-VITAMIN  /IRON) TABS    vitamin B complex with vitamin C (STRESS TAB) tablet    vitamin B-12 (CYANOCOBALAMIN) 100 MCG tablet    vitamin D3 (CHOLECALCIFEROL) 50 mcg (2000 units) tablet     No current facility-administered medications for this visit.             - No identified additional risk factors other than previously addressed    Antiplatelet or Anticoagulation Medication Instructions   - Patient is on no antiplatelet or anticoagulation medications.    Additional Medication Instructions  Hold all NSAIDs, herbal supplements, and multivitamins 7 days prior to procedure.  Hold losartan the morning of his procedure    Recommendation  APPROVAL GIVEN to proceed with proposed procedure, without further diagnostic evaluation.      Subjective       HPI related to upcoming procedure: Drea will be undergoing a right total knee replacement on 3/20/2024 at [facility].  Past medical history significant for hypertension, COPD, hyperlipidemia, B12 deficiency, iron deficiency anemia, chronic kidney disease, BPH, ureter cancer and lung cancer.  Recently had a syncopal episode and was seen in the ER 2/14/2024.  He was diagnosed with left upper lobe and left lower lobe pneumonia treated with antibiotics symptoms resolved.  He has since followed up with cardiology who has cleared him for this procedure.  Underwent a stress test 3/6/2024 and an echocardiogram 3/6/2024.  He is otherwise asymptomatic today.  Feeling well and at his baseline        3/12/2024     10:00 AM   Preop Questions   1. Have you ever had a heart attack or stroke? No   2. Have you ever had surgery on your heart or blood vessels, such as a stent placement, a coronary artery bypass, or surgery on an artery in your head, neck, heart, or legs? No   3. Do you have chest pain with activity? No   4. Do you have a history of  heart failure? No   5. Do you currently have a cold, bronchitis or symptoms of other infection? No   6. Do you have a cough, shortness of breath, or wheezing? YES - Chronic COPD   7. Do you or anyone in your family have previous history of blood clots? No   8. Do you or does anyone in your family have a serious bleeding problem such as prolonged bleeding following surgeries or cuts? No   9. Have you ever had problems with anemia or been told to take iron pills? YES - On B12   10. Have you had any abnormal blood loss such as black, tarry or bloody stools? No   11. Have you ever had a blood transfusion? No   12. Are you willing to have a blood transfusion if it is medically needed before, during, or after your surgery? Yes   13. Have you or any of your relatives ever had problems with anesthesia? No   14. Do you have sleep apnea, excessive snoring or daytime drowsiness? YES - Snoring   14a. Do you have a CPAP machine? No   15. Do you have any artifical heart valves or other implanted medical devices like a pacemaker, defibrillator, or continuous glucose monitor? No   16. Do you have artificial joints? YES - B/L hips   17. Are you allergic to latex? No       Patient Active Problem List    Diagnosis Date Noted    Iron deficiency anemia, unspecified iron deficiency anemia type 09/27/2022     Priority: Medium    Chronic kidney disease, stage 3a (H) 09/27/2022     Priority: Medium    Anemia, unspecified type 09/27/2022     Priority: Medium    Benign localized hyperplasia of prostate 09/26/2022     Priority: Medium    Osteoporosis 09/26/2022     Priority: Medium    Hydronephrosis concurrent  with and due to ureteral stricture 06/02/2021     Priority: Medium    Other specified disorders of kidney and ureter 04/15/2021     Priority: Medium     Formatting of this note might be different from the original.  Added automatically from request for surgery [id number]        Squamous Cell Carcinoma Of The Skin      Priority: Medium     Created by Conversion  [facility] Annotation: Oct  6 2014 10:12AM - Raffi Guevara: left cheek   area, removed 10/2 but one lateral margin postive for remaining in situ   squamous cell ca- see report  Replacement Utility updated for latest IMO load        (Presumed) malignant neoplasm of upper lobe of left lung (H) 11/12/2018     Priority: Medium    Multiple pulmonary nodules 06/01/2018     Priority: Medium    COPD (chronic obstructive pulmonary disease) (H) 12/20/2017     Priority: Medium     Dx at [facility] before 2013 by spirometry and they started the Combivent   inhaler use        B12 deficiency      Priority: Medium     Created by Conversion        S/P ileal conduit (H) 12/21/2016     Priority: Medium     For transitional cell cancer of bladder in 2002        Primary Osteoarthritis Of The Lumbar Vertebrae      Priority: Medium     Created by Conversion        Obesity      Priority: Medium     Created by Conversion        Benign Essential Hypertension      Priority: Medium     Created by Conversion        Malignant neoplasm of bladder (H) 10/02/2002     Priority: Medium    Malignant neoplasm of ureter (H) 09/30/2002     Priority: Medium     Left and right side  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 treated with laser ablation  Doe Dyer MD           Past Medical History:   Diagnosis Date    Cancer (H)     bladder    COPD (chronic obstructive pulmonary disease) (H)     Hypertension     Rheumatoid arthritis (H)      Past Surgical History:   Procedure Laterality Date    [facility] REMOVAL TESTIS,SIMPLE      Description: Orchiectomy Left;  Recorded:  2010;  Comments: for benign interstitial tumor    [facility] CYSTECTOMY,ILEAL CONDUIT/SIGMOID BLADDER      Description: Complete Bladder Cystectomy With Ureteroileal Conduit;  Proc Date: 2002;  Comments: cystoprostatectomy for squamous cell carcinoma per [facility] REMV PROSTATE,RETROPUBIC,SUBTOTAL      Description: Prostatectomy, Retropubic;  Recorded: 2009;  Comments: 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 per [facility] TOTAL HIP ARTHROPLASTY      Description: Total Hip Replacement;  Proc Date: 10/17/2012;    [facility] REPAIR UMBILICAL IRLANDA,<4Y/O,REDUC      Description: Umbilical Hernia Repair;  Recorded: 2010;     Current Outpatient Medications   Medication Sig Dispense Refill    acetaminophen 500 MG CAPS Take 1,000 mg by mouth      albuterol (PROAIR HFA/PROVENTIL HFA/VENTOLIN HFA) 108 (90 Base) MCG/ACT inhaler INHALE TWO PUFFS BY MOUTH EVERY FOUR HOURS AS NEEDED FOR WHEEZING 8.5 g 5    amLODIPine (NORVASC) 10 MG tablet Take 1 tablet (10 mg) by mouth daily 90 tablet 3    losartan (COZAAR) 100 MG tablet Take 1 tablet (100 mg) by mouth daily 90 tablet 3    mometasone-formoterol (DULERA) 100-5 MCG/ACT inhaler Inhale 2 puffs into the lungs 2 times daily 13 g 5    Multiple Vitamins-Iron (MULTI-VITAMIN  /IRON) TABS       vitamin B complex with vitamin C (STRESS TAB) tablet Take 1 tablet by mouth daily      vitamin B-12 (CYANOCOBALAMIN) 100 MCG tablet Take 100 mcg by mouth      vitamin D3 (CHOLECALCIFEROL) 50 mcg (2000 units) tablet Take 2 tablets by mouth daily         No Known Allergies     Social History     Tobacco Use    Smoking status: Former     Types: Cigarettes     Quit date: 1980     Years since quittin.2     Passive exposure: Past    Smokeless tobacco: Never   Substance Use Topics    Alcohol use: No     Comment: Alcoholic Drinks/day: 2-3 beer per month     No family history on file.  History   Drug Use No         Review of Systems   Constitutional:  Negative for chills  "and fever.   HENT:  Negative for congestion, ear pain, hearing loss and sore throat.    Eyes:  Negative for pain and visual disturbance.   Respiratory:  Negative for cough and shortness of breath.    Cardiovascular:  Negative for chest pain, palpitations and peripheral edema.   Gastrointestinal:  Negative for abdominal pain, constipation and diarrhea.   Endocrine: Negative for polyuria.   Genitourinary:  Negative for dysuria and frequency.   Musculoskeletal:  Negative for arthralgias and myalgias.        Right knee pain   Skin:  Negative for rash.   Allergic/Immunologic: Negative.    Neurological:  Negative for dizziness, weakness, headaches and paresthesias.   Hematological:  Does not bruise/bleed easily.   Psychiatric/Behavioral:  Negative for mood changes. The patient is not nervous/anxious.        Objective    /64 (BP Location: Left arm, Patient Position: Sitting, Cuff Size: Adult Regular)   Pulse 76   Temp 98.4  F (36.9  C) (Oral)   Resp 14   Ht 1.791 m (5' 10.5\")   Wt 78 kg (172 lb)   SpO2 96%   BMI 24.33 kg/m     Estimated body mass index is 24.33 kg/m  as calculated from the following:    Height as of this encounter: 1.791 m (5' 10.5\").    Weight as of this encounter: 78 kg (172 lb).  Physical Exam  Vitals and nursing note reviewed.   Constitutional:       General: He is not in acute distress.     Appearance: Normal appearance. He is not ill-appearing, toxic-appearing or diaphoretic.   HENT:      Head: Normocephalic and atraumatic.   Eyes:      General:         Right eye: No discharge.         Left eye: No discharge.      Conjunctiva/sclera: Conjunctivae normal.   Cardiovascular:      Rate and Rhythm: Normal rate and regular rhythm.      Heart sounds: No murmur heard.     No friction rub. No gallop.   Pulmonary:      Effort: Pulmonary effort is normal.      Breath sounds: No wheezing, rhonchi or rales.   Abdominal:      General: Abdomen is flat.      Tenderness: There is no abdominal tenderness. " There is no guarding or rebound.   Skin:     General: Skin is warm and dry.      Findings: No rash.   Neurological:      General: No focal deficit present.      Mental Status: He is alert and oriented to person, place, and time. Mental status is at baseline.      Motor: No weakness.   Psychiatric:         Mood and Affect: Mood normal.         Behavior: Behavior normal.         Thought Content: Thought content normal.         Judgment: Judgment normal.         Recent Labs   Lab Test 02/14/24  1106 01/01/24  1200   HGB 12.6* 11.9*    195   INR 0.98  --     140   POTASSIUM 4.4 3.8   CR 1.00 0.95        Diagnostics  Recent Results (from the past 240 hour(s))   Echocardiogram Complete    Collection Time: 03/06/24  8:27 AM   Result Value Ref Range    LVEF  55-60%    NM Lexiscan stress test    Collection Time: 03/06/24 11:38 AM   Result Value Ref Range    Pharmacologic Protocol Lexiscan     Test Type Pharmacological     Baseline HR 75     Baseline Systolic      Baseline Diastolic BP 56     Last Stress HR 90     Last Stress Systolic BP 97     Last Stress Diastolic BP 53     Target      PERCENT HR 85%     ST Deviation Elevation V6 0.4mm     Deviation Time V6 -0.8mm     ST Elevation Amount V3 1.0mm     ST Deviation Amount he aVR -0.5mm     Final Resting /61     Final Resting HR 80     Max Treadmill Speed 0.0     Max Treadmill Grade 0.0     Peak Systolic /61     Peak Diastolic /64     Max HR  111     Stress Phase Resting     Stress Resting Pt Position MANUAL EVENT     Current HR 88     Current BP 97/53     Stress Phase Stress     Stage Minute EXE 00:00     Exercise Stage STAGE 2     Current HR 74     Current /56     Stress Phase Stress     Stage Minute EXE 01:00     Exercise Stage STAGE 3     Current HR 75     Current /56     Stress Phase Stress     Stage Minute EXE 02:00     Exercise Stage STAGE 4     Current HR 90     Current /56     Stress Phase Stress     Stage  Minute EXE 02:11     Exercise Stage STAGE 4     Current HR 91     Current /46     Stress Phase Stress     Stage Minute EXE 03:00     Exercise Stage STAGE 5     Current HR 87     Current /46     Stress Phase Stress     Stage Minute EXE 03:54     Exercise Stage STAGE 5     Current HR 92     Current BP 97/53     Stress Phase Stress     Stage Minute EXE 04:00     Exercise Stage STAGE 6     Current HR 91     Current BP 97/53     Stress Phase Stress     Stage Minute EXE 04:04     Exercise Stage STAGE 6     Current HR 90     Current BP 97/53     Stress Phase Recovery     Stage Minute REC 00:17     Exercise Stage Recovery     Current HR 88     Current BP 97/53     Stress Phase Recovery     Stage Minute REC 00:55     Exercise Stage Recovery     Current HR 83     Current BP 97/53     Stress Phase Recovery     Stage Minute REC 01:18     Exercise Stage Recovery     Current HR 83     Current BP 92/59     Stress Phase Recovery     Stage Minute REC 01:55     Exercise Stage Recovery     Current HR 84     Current BP 92/59     Stress Phase Recovery     Stage Minute REC 02:55     Exercise Stage Recovery     Current HR 87     Current BP 92/59     Stress Phase Recovery     Stage Minute REC 03:16     Exercise Stage Recovery     Current HR 80     Current /64     Stress Phase Recovery     Stage Minute REC 03:55     Exercise Stage Recovery     Current HR 82     Current /64     Stress Phase Recovery     Stage Minute REC 04:14     Exercise Stage Recovery     Current HR 79     Current /61     Stress Phase Recovery     Stage Minute REC 04:21     Exercise Stage Recovery     Current HR 80     Current /61     Max Predicted HR  85 %    Rate Pressure Product 10,767.0     Stress/rest perfusion ratio 1.38     Left Ventricular EF 66 %   CBC with platelets    Collection Time: 03/12/24 10:53 AM   Result Value Ref Range    WBC Count 5.0 4.0 - 11.0 10e3/uL    RBC Count 4.07 (L) 4.40 - 5.90 10e6/uL    Hemoglobin 12.6 (L)  13.3 - 17.7 g/dL    Hematocrit 38.3 (L) 40.0 - 53.0 %    MCV 94 78 - 100 fL    MCH 31.0 26.5 - 33.0 pg    MCHC 32.9 31.5 - 36.5 g/dL    RDW 13.6 10.0 - 15.0 %    Platelet Count 240 150 - 450 10e3/uL      No EKG this visit, completed in the last 90 days.    Revised Cardiac Risk Index (RCRI)  The patient has the following serious cardiovascular risks for perioperative complications:   - No serious cardiac risks = 0 points     RCRI Interpretation: 1 point: Class II (low risk - 0.9% complication rate)         Signed Electronically by: Howard Luu PA-C  Copy of this evaluation report is provided to requesting physician.

## 2024-03-12 NOTE — PROGRESS NOTES
Preoperative Evaluation  [facility]  [address]  [place] [facility] [facility] [address]  Phone: [phone]  Fax: [phone]  Primary Provider: Pily Ventura  Pre-op Performing Provider: PILY VENTURA  Mar 12, 2024       Karen is a 89 year old, presenting for the following:  Pre-Op Exam (DOS 3/20/24 for R TKR at [facility] with Dr. Burks)        3/12/2024    10:01 AM   Additional Questions   Roomed by Zuleyka Sheehan   Accompanied by none     Surgical Information  Surgery/Procedure: R TKR  Surgery Location: [facility]  Surgeon: Dr. Burks  Surgery Date: 3/20/24  Time of Surgery: TBD  Where patient plans to recover: At home with family  Fax number for surgical facility: Note does not need to be faxed, will be available electronically in Epic.    Assessment & Plan     The proposed surgical procedure is considered INTERMEDIATE risk.  Preop general physical exam  Primary osteoarthritis of right knee  Drea is a pleasant 89-year-old male who presents to the clinic today for a preop physical.  Will be undergoing total right knee replacement on 3/20/2024 at [facility] for osteoarthritis of right knee.  Otherwise feeling well today.  Denies any chest pain, shortness of breath, lightheaded or dizziness.  Will update labs today including a CBC and a complete metabolic panel.  - CBC with platelets; Future  - Comprehensive metabolic panel (BMP + Alb, Alk Phos, ALT, AST, Total. Bili, TP); Future    Benign Essential Hypertension  Blood pressure well-controlled at 120/64.  Hold losartan the morning of his procedure    Hyperlipidemia LDL goal <100  Last lipid panel 2/23 showed an , triglyceride of 90, HDL of 50.  Recently did have a syncopal episode in the emergency department and was diagnosed with left-sided pneumonia.  He was also referred to cardiology for further evaluation.  Underwent stress test 3/6/2024 that did show a medium sized  transmural infarct in the mid to basilar segment but no ischemia noted.  Also had an echocardiogram that showed an EF of 55 to 60% with no valvular disease.  Cardiology has since cleared him for this procedure as of 2/22/2024.  He is asymptomatic at this time.  - Lipid panel reflex to direct LDL Fasting; Future    Pulmonary emphysema, unspecified emphysema type (H)  CT chest showed a left upper lobe and left lower lobe bronco pneumonitis.  CT recommended 3 to 4-month follow-up.  He was treated with antibiotics and steroids symptoms have since resolved.  He is not having any worsening symptoms.  Currently on Dulera and albuterol and feels that his symptoms are well-controlled.  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  Diagnosed with ureter cancer in 2002.  No symptoms at this time.  Follows closely with [facility] urology.    Iron deficiency anemia, unspecified iron deficiency anemia type  Previously on iron supplements due to constipation discontinue this and is only taking a multivitamin.  Will check iron levels today  - Iron and iron binding capacity; Future  - Iron and iron binding capacity; Future    Chronic kidney disease, stage 3a (H)  Kidney function has remained stable in the past.  He is not taking any nephrotoxic medications including NSAIDs.  Blood pressure is well-controlled today.  Will check complete metabolic panel and a microalbumin.  He is to stay well-hydrated  - Albumin Random Urine Quantitative with Creat Ratio; Future    B12 deficiency  Continue with B12 supplements.  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  Continues to follow-up with his oncology team annually.  No new symptoms     Follow-up Visit   Expected date:  Sep 12, 2024  (Approximate)      Follow Up Appointment Details:     Follow-up with whom?: Me    Follow-Up for what?: Adult Preventive    How?: In Person                     Current Outpatient Medications   Medication    acetaminophen 500 MG CAPS    albuterol (PROAIR HFA/PROVENTIL HFA/VENTOLIN HFA) 108 (90 Base) MCG/ACT inhaler    amLODIPine (NORVASC) 10 MG tablet    losartan (COZAAR) 100 MG tablet    mometasone-formoterol (DULERA) 100-5 MCG/ACT inhaler    Multiple Vitamins-Iron (MULTI-VITAMIN  /IRON) TABS    vitamin B complex with vitamin C (STRESS TAB) tablet    vitamin B-12 (CYANOCOBALAMIN) 100 MCG tablet    vitamin D3 (CHOLECALCIFEROL) 50 mcg (2000 units) tablet     No current facility-administered medications for this visit.             - No identified additional risk factors other than previously addressed    Antiplatelet or Anticoagulation Medication Instructions   - Patient is on no antiplatelet or anticoagulation medications.    Additional Medication Instructions  Hold all NSAIDs, herbal supplements, and multivitamins 7 days prior to procedure.  Hold losartan the morning of his procedure    Recommendation  APPROVAL GIVEN to proceed with proposed procedure, without further diagnostic evaluation.      Subjective       HPI related to upcoming procedure: Drea will be undergoing a right total knee replacement on 3/20/2024 at [facility].  Past medical history significant for hypertension, COPD, hyperlipidemia, B12 deficiency, iron deficiency anemia, chronic kidney disease, BPH, ureter cancer and lung cancer.  Recently had a syncopal episode and was seen in the ER 2/14/2024.  He was diagnosed with left upper lobe and left lower lobe pneumonia treated with antibiotics symptoms resolved.  He has since followed up with cardiology who has cleared him for this procedure.  Underwent a stress test 3/6/2024 and an echocardiogram 3/6/2024.  He is otherwise asymptomatic today.  Feeling well and at his baseline        3/12/2024     10:00 AM   Preop Questions   1. Have you ever had a heart attack or stroke? No   2. Have you ever had surgery on your heart or blood vessels, such as a stent placement, a coronary artery bypass, or surgery on an artery in your head, neck, heart, or legs? No   3. Do you have chest pain with activity? No   4. Do you have a history of  heart failure? No   5. Do you currently have a cold, bronchitis or symptoms of other infection? No   6. Do you have a cough, shortness of breath, or wheezing? YES - Chronic COPD   7. Do you or anyone in your family have previous history of blood clots? No   8. Do you or does anyone in your family have a serious bleeding problem such as prolonged bleeding following surgeries or cuts? No   9. Have you ever had problems with anemia or been told to take iron pills? YES - On B12   10. Have you had any abnormal blood loss such as black, tarry or bloody stools? No   11. Have you ever had a blood transfusion? No   12. Are you willing to have a blood transfusion if it is medically needed before, during, or after your surgery? Yes   13. Have you or any of your relatives ever had problems with anesthesia? No   14. Do you have sleep apnea, excessive snoring or daytime drowsiness? YES - Snoring   14a. Do you have a CPAP machine? No   15. Do you have any artifical heart valves or other implanted medical devices like a pacemaker, defibrillator, or continuous glucose monitor? No   16. Do you have artificial joints? YES - B/L hips   17. Are you allergic to latex? No       Patient Active Problem List    Diagnosis Date Noted    Iron deficiency anemia, unspecified iron deficiency anemia type 09/27/2022     Priority: Medium    Chronic kidney disease, stage 3a (H) 09/27/2022     Priority: Medium    Anemia, unspecified type 09/27/2022     Priority: Medium    Benign localized hyperplasia of prostate 09/26/2022     Priority: Medium    Osteoporosis 09/26/2022     Priority: Medium    Hydronephrosis concurrent  with and due to ureteral stricture 06/02/2021     Priority: Medium    Other specified disorders of kidney and ureter 04/15/2021     Priority: Medium     Formatting of this note might be different from the original.  Added automatically from request for surgery [id number]        Squamous Cell Carcinoma Of The Skin      Priority: Medium     Created by Conversion  [facility] Annotation: Oct  6 2014 10:12AM - Raffi Guevara: left cheek   area, removed 10/2 but one lateral margin postive for remaining in situ   squamous cell ca- see report  Replacement Utility updated for latest IMO load        (Presumed) malignant neoplasm of upper lobe of left lung (H) 11/12/2018     Priority: Medium    Multiple pulmonary nodules 06/01/2018     Priority: Medium    COPD (chronic obstructive pulmonary disease) (H) 12/20/2017     Priority: Medium     Dx at [facility] before 2013 by spirometry and they started the Combivent   inhaler use        B12 deficiency      Priority: Medium     Created by Conversion        S/P ileal conduit (H) 12/21/2016     Priority: Medium     For transitional cell cancer of bladder in 2002        Primary Osteoarthritis Of The Lumbar Vertebrae      Priority: Medium     Created by Conversion        Obesity      Priority: Medium     Created by Conversion        Benign Essential Hypertension      Priority: Medium     Created by Conversion        Malignant neoplasm of bladder (H) 10/02/2002     Priority: Medium    Malignant neoplasm of ureter (H) 09/30/2002     Priority: Medium     Left and right side  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 treated with laser ablation  Doe Dyer MD           Past Medical History:   Diagnosis Date    Cancer (H)     bladder    COPD (chronic obstructive pulmonary disease) (H)     Hypertension     Rheumatoid arthritis (H)      Past Surgical History:   Procedure Laterality Date    HC REMOVAL TESTIS,SIMPLE      Description: Orchiectomy Left;  Recorded:  2010;  Comments: for benign interstitial tumor    [facility] CYSTECTOMY,ILEAL CONDUIT/SIGMOID BLADDER      Description: Complete Bladder Cystectomy With Ureteroileal Conduit;  Proc Date: 2002;  Comments: cystoprostatectomy for squamous cell carcinoma per [facility] REMV PROSTATE,RETROPUBIC,SUBTOTAL      Description: Prostatectomy, Retropubic;  Recorded: 2009;  Comments: 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 per [facility] TOTAL HIP ARTHROPLASTY      Description: Total Hip Replacement;  Proc Date: 10/17/2012;    RUST REPAIR UMBILICAL IRLANDA,<4Y/O,REDUC      Description: Umbilical Hernia Repair;  Recorded: 2010;     Current Outpatient Medications   Medication Sig Dispense Refill    acetaminophen 500 MG CAPS Take 1,000 mg by mouth      albuterol (PROAIR HFA/PROVENTIL HFA/VENTOLIN HFA) 108 (90 Base) MCG/ACT inhaler INHALE TWO PUFFS BY MOUTH EVERY FOUR HOURS AS NEEDED FOR WHEEZING 8.5 g 5    amLODIPine (NORVASC) 10 MG tablet Take 1 tablet (10 mg) by mouth daily 90 tablet 3    losartan (COZAAR) 100 MG tablet Take 1 tablet (100 mg) by mouth daily 90 tablet 3    mometasone-formoterol (DULERA) 100-5 MCG/ACT inhaler Inhale 2 puffs into the lungs 2 times daily 13 g 5    Multiple Vitamins-Iron (MULTI-VITAMIN  /IRON) TABS       vitamin B complex with vitamin C (STRESS TAB) tablet Take 1 tablet by mouth daily      vitamin B-12 (CYANOCOBALAMIN) 100 MCG tablet Take 100 mcg by mouth      vitamin D3 (CHOLECALCIFEROL) 50 mcg (2000 units) tablet Take 2 tablets by mouth daily         No Known Allergies     Social History     Tobacco Use    Smoking status: Former     Types: Cigarettes     Quit date: 1980     Years since quittin.2     Passive exposure: Past    Smokeless tobacco: Never   Substance Use Topics    Alcohol use: No     Comment: Alcoholic Drinks/day: 2-3 beer per month     No family history on file.  History   Drug Use No         Review of Systems   Constitutional:  Negative for chills  "and fever.   HENT:  Negative for congestion, ear pain, hearing loss and sore throat.    Eyes:  Negative for pain and visual disturbance.   Respiratory:  Negative for cough and shortness of breath.    Cardiovascular:  Negative for chest pain, palpitations and peripheral edema.   Gastrointestinal:  Negative for abdominal pain, constipation and diarrhea.   Endocrine: Negative for polyuria.   Genitourinary:  Negative for dysuria and frequency.   Musculoskeletal:  Negative for arthralgias and myalgias.        Right knee pain   Skin:  Negative for rash.   Allergic/Immunologic: Negative.    Neurological:  Negative for dizziness, weakness, headaches and paresthesias.   Hematological:  Does not bruise/bleed easily.   Psychiatric/Behavioral:  Negative for mood changes. The patient is not nervous/anxious.        Objective    /64 (BP Location: Left arm, Patient Position: Sitting, Cuff Size: Adult Regular)   Pulse 76   Temp 98.4  F (36.9  C) (Oral)   Resp 14   Ht 1.791 m (5' 10.5\")   Wt 78 kg (172 lb)   SpO2 96%   BMI 24.33 kg/m     Estimated body mass index is 24.33 kg/m  as calculated from the following:    Height as of this encounter: 1.791 m (5' 10.5\").    Weight as of this encounter: 78 kg (172 lb).  Physical Exam  Vitals and nursing note reviewed.   Constitutional:       General: He is not in acute distress.     Appearance: Normal appearance. He is not ill-appearing, toxic-appearing or diaphoretic.   HENT:      Head: Normocephalic and atraumatic.   Eyes:      General:         Right eye: No discharge.         Left eye: No discharge.      Conjunctiva/sclera: Conjunctivae normal.   Cardiovascular:      Rate and Rhythm: Normal rate and regular rhythm.      Heart sounds: No murmur heard.     No friction rub. No gallop.   Pulmonary:      Effort: Pulmonary effort is normal.      Breath sounds: No wheezing, rhonchi or rales.   Abdominal:      General: Abdomen is flat.      Tenderness: There is no abdominal tenderness. " There is no guarding or rebound.   Skin:     General: Skin is warm and dry.      Findings: No rash.   Neurological:      General: No focal deficit present.      Mental Status: He is alert and oriented to person, place, and time. Mental status is at baseline.      Motor: No weakness.   Psychiatric:         Mood and Affect: Mood normal.         Behavior: Behavior normal.         Thought Content: Thought content normal.         Judgment: Judgment normal.         Recent Labs   Lab Test 02/14/24  1106 01/01/24  1200   HGB 12.6* 11.9*    195   INR 0.98  --     140   POTASSIUM 4.4 3.8   CR 1.00 0.95        Diagnostics  Recent Results (from the past 240 hour(s))   Echocardiogram Complete    Collection Time: 03/06/24  8:27 AM   Result Value Ref Range    LVEF  55-60%    NM Lexiscan stress test    Collection Time: 03/06/24 11:38 AM   Result Value Ref Range    Pharmacologic Protocol Lexiscan     Test Type Pharmacological     Baseline HR 75     Baseline Systolic      Baseline Diastolic BP 56     Last Stress HR 90     Last Stress Systolic BP 97     Last Stress Diastolic BP 53     Target      PERCENT HR 85%     ST Deviation Elevation V6 0.4mm     Deviation Time V6 -0.8mm     ST Elevation Amount V3 1.0mm     ST Deviation Amount he aVR -0.5mm     Final Resting /61     Final Resting HR 80     Max Treadmill Speed 0.0     Max Treadmill Grade 0.0     Peak Systolic /61     Peak Diastolic /64     Max HR  111     Stress Phase Resting     Stress Resting Pt Position MANUAL EVENT     Current HR 88     Current BP 97/53     Stress Phase Stress     Stage Minute EXE 00:00     Exercise Stage STAGE 2     Current HR 74     Current /56     Stress Phase Stress     Stage Minute EXE 01:00     Exercise Stage STAGE 3     Current HR 75     Current /56     Stress Phase Stress     Stage Minute EXE 02:00     Exercise Stage STAGE 4     Current HR 90     Current /56     Stress Phase Stress     Stage  Minute EXE 02:11     Exercise Stage STAGE 4     Current HR 91     Current /46     Stress Phase Stress     Stage Minute EXE 03:00     Exercise Stage STAGE 5     Current HR 87     Current /46     Stress Phase Stress     Stage Minute EXE 03:54     Exercise Stage STAGE 5     Current HR 92     Current BP 97/53     Stress Phase Stress     Stage Minute EXE 04:00     Exercise Stage STAGE 6     Current HR 91     Current BP 97/53     Stress Phase Stress     Stage Minute EXE 04:04     Exercise Stage STAGE 6     Current HR 90     Current BP 97/53     Stress Phase Recovery     Stage Minute REC 00:17     Exercise Stage Recovery     Current HR 88     Current BP 97/53     Stress Phase Recovery     Stage Minute REC 00:55     Exercise Stage Recovery     Current HR 83     Current BP 97/53     Stress Phase Recovery     Stage Minute REC 01:18     Exercise Stage Recovery     Current HR 83     Current BP 92/59     Stress Phase Recovery     Stage Minute REC 01:55     Exercise Stage Recovery     Current HR 84     Current BP 92/59     Stress Phase Recovery     Stage Minute REC 02:55     Exercise Stage Recovery     Current HR 87     Current BP 92/59     Stress Phase Recovery     Stage Minute REC 03:16     Exercise Stage Recovery     Current HR 80     Current /64     Stress Phase Recovery     Stage Minute REC 03:55     Exercise Stage Recovery     Current HR 82     Current /64     Stress Phase Recovery     Stage Minute REC 04:14     Exercise Stage Recovery     Current HR 79     Current /61     Stress Phase Recovery     Stage Minute REC 04:21     Exercise Stage Recovery     Current HR 80     Current /61     Max Predicted HR  85 %    Rate Pressure Product 10,767.0     Stress/rest perfusion ratio 1.38     Left Ventricular EF 66 %   CBC with platelets    Collection Time: 03/12/24 10:53 AM   Result Value Ref Range    WBC Count 5.0 4.0 - 11.0 10e3/uL    RBC Count 4.07 (L) 4.40 - 5.90 10e6/uL    Hemoglobin 12.6 (L)  13.3 - 17.7 g/dL    Hematocrit 38.3 (L) 40.0 - 53.0 %    MCV 94 78 - 100 fL    MCH 31.0 26.5 - 33.0 pg    MCHC 32.9 31.5 - 36.5 g/dL    RDW 13.6 10.0 - 15.0 %    Platelet Count 240 150 - 450 10e3/uL      No EKG this visit, completed in the last 90 days.    Revised Cardiac Risk Index (RCRI)  The patient has the following serious cardiovascular risks for perioperative complications:   - No serious cardiac risks = 0 points     RCRI Interpretation: 1 point: Class II (low risk - 0.9% complication rate)         Signed Electronically by: Howard Luu PA-C  Copy of this evaluation report is provided to requesting physician.

## 2024-03-12 NOTE — PATIENT INSTRUCTIONS
Stop all Ibuprofen, motrin, or aleve 7 days before surgery, Tylenol is OK   Stop all multivitamins 7 days before surgery.   Hold losartan the more of your surgery.      Please call the radiology dep at [facility] to schedule your test today at [phone]

## 2024-03-13 LAB
ALBUMIN SERPL BCG-MCNC: 4.1 G/DL (ref 3.5–5.2)
ALP SERPL-CCNC: 70 U/L (ref 40–150)
ALT SERPL W P-5'-P-CCNC: 12 U/L (ref 0–70)
ANION GAP SERPL CALCULATED.3IONS-SCNC: 10 MMOL/L (ref 7–15)
AST SERPL W P-5'-P-CCNC: 19 U/L (ref 0–45)
BILIRUB SERPL-MCNC: 0.3 MG/DL
BUN SERPL-MCNC: 14.9 MG/DL (ref 8–23)
CALCIUM SERPL-MCNC: 9.5 MG/DL (ref 8.8–10.2)
CHLORIDE SERPL-SCNC: 105 MMOL/L (ref 98–107)
CHOLEST SERPL-MCNC: 167 MG/DL
CREAT SERPL-MCNC: 0.91 MG/DL (ref 0.67–1.17)
CREAT UR-MCNC: 153 MG/DL
DEPRECATED HCO3 PLAS-SCNC: 25 MMOL/L (ref 22–29)
EGFRCR SERPLBLD CKD-EPI 2021: 81 ML/MIN/1.73M2
FASTING STATUS PATIENT QL REPORTED: YES
GLUCOSE SERPL-MCNC: 109 MG/DL (ref 70–99)
HDLC SERPL-MCNC: 39 MG/DL
IRON BINDING CAPACITY (ROCHE): 221 UG/DL (ref 240–430)
IRON SATN MFR SERPL: 17 % (ref 15–46)
IRON SERPL-MCNC: 38 UG/DL (ref 61–157)
LDLC SERPL CALC-MCNC: 107 MG/DL
MICROALBUMIN UR-MCNC: 119 MG/L
MICROALBUMIN/CREAT UR: 77.78 MG/G CR (ref 0–17)
NONHDLC SERPL-MCNC: 128 MG/DL
POTASSIUM SERPL-SCNC: 4.4 MMOL/L (ref 3.4–5.3)
PROT SERPL-MCNC: 6.6 G/DL (ref 6.4–8.3)
SODIUM SERPL-SCNC: 140 MMOL/L (ref 135–145)
TRIGL SERPL-MCNC: 103 MG/DL
VIT B12 SERPL-MCNC: 782 PG/ML (ref 232–1245)

## 2024-03-15 NOTE — TREATMENT PLAN
"Orthopedic Surgery Pre-Op Plan: Karen Santiago  pre-op review. This is NOT an H&P   Surgeon: Dr. Mckeon    [facility]: [facility]  Name of Surgery: Right Total Knee Arthroplasty   Date of Surgery: 3/20/24  H&P: Completed on 3/12/24 by Howard Luu PA-C at [facility].   History of ASA, NSAIDS, vitamin and/or herbal supplements, GLP-1 Agonist or SGLT Inhibitor medication taken within 10 days?: Yes- Multivitamins-patient instructed to hold these for 7 days before surgery.  History of blood thinners?: No    Plan:   1) Discharge Plan: Home POD 1 with assist of Family (Daughter Kitty will be staying with him after surgery  and his two sons will also help out).  Please see Discharge Planning section near bottom of this note for further details.     2) Dyspnea on Exertion with Syncopal Episode 2/14/24: syncopal episode 2/14/24 while waiting in Urgent Care. Brought to ED by ambulance. Diagnosed with pneumonia and also dehydrated which likely contributed to syncopal episode. Pneumonia was treated outpatient with doxycycline and prednisone with improvement. Reviewed recent Cardiology evaluation on 2/22/24 by Dr. Carlos, [facility]: patient has limited functional capacity and cannot achieve >4 METS.  Gets dyspnea with activity which could be related to COPD and deconditioning.  NM stress test and echocardiogram were ordered to rule out high risk CAD and significant structural cardiac disease.  Cleared by Dr. Carlos to proceed with knee surgery after reviewing results of NM Stress Test and Echocardiogram completed on 3/6/24.   Per Dr. Carlos note on 3/7/24:  \" Addendum 3/7/2024:  Reviewed echocardiogram and NM stress.  No high risk disease on NM stress, possible prior infarct vs. Diaphragmatic attenuation artifact.  Either way, no high risk findings that would impact surgical risk.  Echocardiogram showed normal function which is reassuring.  May proceed with knee surgery without any " "additional testing. \"      NM Stress Test 3/6/24:    1.The nuclear stress test is abnormal.    2.Negative pharmacological regadenoson ECG for ischemia.    3.There is a medium sized area of transmural infarction in the mid to basal inferoseptal segment(s) of the left ventricle.  No ischemia seen.    4.The left ventricular ejection fraction at stress is 66%.    5.Stress to rest cavity ratio is 1.38.    6.The patient is at an intermediate risk of future cardiac ischemic events.    There is no prior study for comparison.    Echocardiogram 3/6/24:   Interpretation Summary   The left ventricle is normal in size.  The visual ejection fraction is 55-60%.  No regional wall motion abnormalities noted.  Diastolic Doppler findings (E/E' ratio and/or other parameters) suggest left  ventricular filling pressures are normal.  Normal right ventricle size and systolic function.  IVC diameter <2.1 cm collapsing >50% with sniff suggests a normal RA pressure  of 3 mmHg.  Sinus rhythm was noted.   There is no comparison study available.    3) Hyperlipidemia: Not on statin.    4) Hypertension: Well-controlled on losartan and amlodipine.  Patient instructed to hold losartan on the morning of surgery but to continue taking amlodipine.    5) COPD: Stable.  Does not require supplemental oxygen at baseline.  As noted in #2 above he was  recently successfully treated for pneumonia with course of doxycycline and prednisone starting on 2/14/2024.  Patient feels he is back to his baseline.  On Dulera and albuterol inhalers.    6) Iron Deficiency Anemia: Improved.  Most recent hemoglobin 12.6 on 3/12/2024.  Iron level still low at 38 on 3/12/2024 so patient will continue on iron supplement.    7) Vitamin B-12 Deficiency: On vitamin B-12 supplement.     8) History of Malignant Neoplasm of Upper Lobe of Left Lung: S/P radiation therapy 5-6 years ago.     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 Follows with Urology at [facility].     Patient appears medically optimized for upcoming surgery. I would recommend Hospitalist Consult to assist with medical management. Please call me below with any questions on this patient.       Review of Systems Notable for: Dyspnea on exertion with syncopal episode on 2/14/2024-treated for pneumonia and cleared by cardiology for surgery after stress test and echocardiogram on 3/6/2024, hyperlipidemia, hypertension, COPD, iron deficiency anemia-improved, vitamin B12 deficiency, history of malignant neoplasm of upper lobe of left lung-s/p radiation therapy, history of malignant neoplasm of urinary bladder and right ureter-s/p ileal conduit.    Past Medical History:   Past Medical History:   Diagnosis Date    Cancer (H)     bladder    COPD (chronic obstructive pulmonary disease) (H)     Hypertension     Rheumatoid arthritis (H)      Past Surgical History:   Procedure Laterality Date    HC REMOVAL TESTIS,SIMPLE      Description: Orchiectomy Left;  Recorded: 04/26/2010;  Comments: for benign interstitial tumor    [facility]AL CONDUIT/SIGMOID BLADDER      Description: Complete Bladder Cystectomy With Ureteroileal Conduit;  Proc Date: 01/01/2002;  Comments: cystoprostatectomy for squamous cell carcinoma per [facility] REMV PROSTATE,RETROPUBIC,SUBTOTAL      Description: Prostatectomy, Retropubic;  Recorded: 05/19/2009;  Comments: 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 per [facility] TOTAL HIP ARTHROPLASTY      Description: Total Hip Replacement;  Proc Date: 10/17/2012;    [facility] REPAIR UMBILICAL IRLANDA,<4Y/O,REDUC      Description: Umbilical Hernia Repair;  Recorded: 04/26/2010;       Current Medications:  Patient's Medications   New Prescriptions    No medications on file   Previous Medications    ACETAMINOPHEN 500 MG CAPS    Take 1,000 mg by mouth    ALBUTEROL (PROAIR HFA/PROVENTIL HFA/VENTOLIN HFA) 108 (90 BASE) MCG/ACT INHALER    INHALE TWO  PUFFS BY MOUTH EVERY FOUR HOURS AS NEEDED FOR WHEEZING    AMLODIPINE (NORVASC) 10 MG TABLET    Take 1 tablet (10 mg) by mouth daily    LOSARTAN (COZAAR) 100 MG TABLET    Take 1 tablet (100 mg) by mouth daily    MOMETASONE-FORMOTEROL (DULERA) 100-5 MCG/ACT INHALER    Inhale 2 puffs into the lungs 2 times daily    MULTIPLE VITAMINS-IRON (MULTI-VITAMIN  /IRON) TABS        VITAMIN B COMPLEX WITH VITAMIN C (STRESS TAB) TABLET    Take 1 tablet by mouth daily    VITAMIN B-12 (CYANOCOBALAMIN) 100 MCG TABLET    Take 100 mcg by mouth    VITAMIN D3 (CHOLECALCIFEROL) 50 MCG (2000 UNITS) TABLET    Take 2 tablets by mouth daily   Modified Medications    No medications on file   Discontinued Medications    No medications on file       ALLERGIES:  No Known Allergies    Social History  Social History     Tobacco Use    Smoking status: Former     Types: Cigarettes     Quit date: 1980     Years since quittin.2     Passive exposure: Past    Smokeless tobacco: Never   Vaping Use    Vaping Use: Never used   Substance Use Topics    Alcohol use: No     Comment: Alcoholic Drinks/day: 2-3 beer per month    Drug use: No       Any Abnormal Recent Diagnostics? Yes  Iron level 38 on 3/12/2024: Patient has history of iron deficiency anemia.  Recent hemoglobin near normal at 12.6 on 3/12/2024.  Patient will continue on iron supplement.  Blood glucose 109 on 3/12/2024: No known history of prediabetes or diabetes.  Will monitor BG's during hospital stay.  Goal BG <180.     Discharge Planning:   Discharge plan according to [facility]:    Home POD 1 with assist of Family (Daughter Kitty will be staying with him after surgery  and his two sons will also help out).     24 1623   Discharge Planning   Patient/Family Anticipates Transition to home   Concerns to be Addressed all concerns addressed in this encounter   Living Arrangements   People in Home alone   Type of Residence Private Residence   Is your private residence a single  family home or apartment? Single family home   Stair Railings, Within Home, Primary railings safe and in good condition   Once home, are you able to live on one level? Yes   Which level? Main Level   Bathroom Shower/Tub Tub/Shower unit   Equipment Currently Used at Home none   Support System   Support Systems Children   Do you have someone available to stay with you one or two nights once you are home? Yes       KOTA Gutierrez, CNP   Advanced Practice Nurse Navigator- Orthopedics  [facility]   Phone: [phone]

## 2024-03-20 ENCOUNTER — ANESTHESIA (OUTPATIENT)
Dept: SURGERY | Facility: CLINIC | Age: 89
End: 2024-03-20
Payer: COMMERCIAL

## 2024-03-20 ENCOUNTER — APPOINTMENT (OUTPATIENT)
Dept: RADIOLOGY | Facility: CLINIC | Age: 89
End: 2024-03-20
Attending: PHYSICIAN ASSISTANT
Payer: COMMERCIAL

## 2024-03-20 ENCOUNTER — APPOINTMENT (OUTPATIENT)
Dept: PHYSICAL THERAPY | Facility: CLINIC | Age: 89
End: 2024-03-20
Attending: ORTHOPAEDIC SURGERY
Payer: COMMERCIAL

## 2024-03-20 ENCOUNTER — ANESTHESIA EVENT (OUTPATIENT)
Dept: SURGERY | Facility: CLINIC | Age: 89
End: 2024-03-20
Payer: COMMERCIAL

## 2024-03-20 ENCOUNTER — HOSPITAL ENCOUNTER (OUTPATIENT)
Facility: CLINIC | Age: 89
Discharge: HOME | End: 2024-03-21
Attending: ORTHOPAEDIC SURGERY | Admitting: ORTHOPAEDIC SURGERY
Payer: COMMERCIAL

## 2024-03-20 DIAGNOSIS — Z47.1 ORTHOPEDIC AFTERCARE FOR JOINT REPLACEMENT: ICD-10-CM

## 2024-03-20 DIAGNOSIS — M17.11 PRIMARY OSTEOARTHRITIS OF RIGHT KNEE: Primary | ICD-10-CM

## 2024-03-20 PROCEDURE — 250N000013 HC RX MED GY IP 250 OP 250 PS 637

## 2024-03-20 PROCEDURE — 370N000017 HC ANESTHESIA TECHNICAL FEE, PER MIN: Performed by: ORTHOPAEDIC SURGERY

## 2024-03-20 PROCEDURE — 250N000011 HC RX IP 250 OP 636: Performed by: ANESTHESIOLOGY

## 2024-03-20 PROCEDURE — 258N000003 HC RX IP 258 OP 636: Performed by: ANESTHESIOLOGY

## 2024-03-20 PROCEDURE — 258N000001 HC RX 258: Performed by: ORTHOPAEDIC SURGERY

## 2024-03-20 PROCEDURE — 999N000141 HC STATISTIC PRE-PROCEDURE NURSING ASSESSMENT: Performed by: ORTHOPAEDIC SURGERY

## 2024-03-20 PROCEDURE — 250N000009 HC RX 250

## 2024-03-20 PROCEDURE — C1776 JOINT DEVICE (IMPLANTABLE): HCPCS | Performed by: ORTHOPAEDIC SURGERY

## 2024-03-20 PROCEDURE — 250N000011 HC RX IP 250 OP 636

## 2024-03-20 PROCEDURE — 999N000065 XR KNEE PORT RIGHT 1/2 VIEWS: Mod: RT

## 2024-03-20 PROCEDURE — 250N000011 HC RX IP 250 OP 636: Performed by: NURSE ANESTHETIST, CERTIFIED REGISTERED

## 2024-03-20 PROCEDURE — 360N000077 HC SURGERY LEVEL 4, PER MIN: Performed by: ORTHOPAEDIC SURGERY

## 2024-03-20 PROCEDURE — 272N000002 HC OR SUPPLY OTHER OPNP: Performed by: ORTHOPAEDIC SURGERY

## 2024-03-20 PROCEDURE — 99204 OFFICE O/P NEW MOD 45 MIN: CPT | Performed by: INTERNAL MEDICINE

## 2024-03-20 PROCEDURE — 710N000010 HC RECOVERY PHASE 1, LEVEL 2, PER MIN: Performed by: ORTHOPAEDIC SURGERY

## 2024-03-20 PROCEDURE — 250N000009 HC RX 250: Performed by: NURSE ANESTHETIST, CERTIFIED REGISTERED

## 2024-03-20 PROCEDURE — 272N000001 HC OR GENERAL SUPPLY STERILE: Performed by: ORTHOPAEDIC SURGERY

## 2024-03-20 PROCEDURE — 250N000013 HC RX MED GY IP 250 OP 250 PS 637: Performed by: PHYSICIAN ASSISTANT

## 2024-03-20 PROCEDURE — 250N000009 HC RX 250: Performed by: ORTHOPAEDIC SURGERY

## 2024-03-20 PROCEDURE — C1713 ANCHOR/SCREW BN/BN,TIS/BN: HCPCS | Performed by: ORTHOPAEDIC SURGERY

## 2024-03-20 PROCEDURE — 250N000011 HC RX IP 250 OP 636: Performed by: PHYSICIAN ASSISTANT

## 2024-03-20 PROCEDURE — 97161 PT EVAL LOW COMPLEX 20 MIN: CPT | Mod: GP

## 2024-03-20 PROCEDURE — 97530 THERAPEUTIC ACTIVITIES: CPT | Mod: GP

## 2024-03-20 PROCEDURE — 258N000003 HC RX IP 258 OP 636: Performed by: NURSE ANESTHETIST, CERTIFIED REGISTERED

## 2024-03-20 DEVICE — CRUCIATE RETAINING FEMORAL
Type: IMPLANTABLE DEVICE | Site: KNEE | Status: FUNCTIONAL
Brand: TRIATHLON

## 2024-03-20 DEVICE — TOBRA FULL DOSE ANTIBIOTIC BONE CEMENT, 10 PACK CATALOG NUMBER IS 6197-9-010
Type: IMPLANTABLE DEVICE | Site: KNEE | Status: FUNCTIONAL
Brand: SIMPLEX

## 2024-03-20 DEVICE — TIBIAL BEARING INSERT
Type: IMPLANTABLE DEVICE | Site: KNEE | Status: FUNCTIONAL
Brand: TRIATHLON

## 2024-03-20 DEVICE — PRIMARY TIBIAL BASEPLATE
Type: IMPLANTABLE DEVICE | Site: KNEE | Status: FUNCTIONAL
Brand: TRIATHLON

## 2024-03-20 RX ORDER — EPHEDRINE SULFATE 50 MG/ML
INJECTION, SOLUTION INTRAMUSCULAR; INTRAVENOUS; SUBCUTANEOUS PRN
Status: DISCONTINUED | OUTPATIENT
Start: 2024-03-20 | End: 2024-03-20

## 2024-03-20 RX ORDER — OXYCODONE HYDROCHLORIDE 5 MG/1
5 TABLET ORAL EVERY 4 HOURS PRN
Status: DISCONTINUED | OUTPATIENT
Start: 2024-03-20 | End: 2024-03-21 | Stop reason: HOSPADM

## 2024-03-20 RX ORDER — CEFAZOLIN SODIUM 1 G/3ML
1 INJECTION, POWDER, FOR SOLUTION INTRAMUSCULAR; INTRAVENOUS EVERY 8 HOURS
Qty: 10 ML | Refills: 0 | Status: COMPLETED | OUTPATIENT
Start: 2024-03-20 | End: 2024-03-21

## 2024-03-20 RX ORDER — LOSARTAN POTASSIUM 50 MG/1
100 TABLET ORAL DAILY
Status: DISCONTINUED | OUTPATIENT
Start: 2024-03-21 | End: 2024-03-21 | Stop reason: HOSPADM

## 2024-03-20 RX ORDER — FLUTICASONE FUROATE AND VILANTEROL 100; 25 UG/1; UG/1
1 POWDER RESPIRATORY (INHALATION) DAILY
Status: DISCONTINUED | OUTPATIENT
Start: 2024-03-20 | End: 2024-03-20

## 2024-03-20 RX ORDER — HYDROMORPHONE HCL IN WATER/PF 6 MG/30 ML
0.2 PATIENT CONTROLLED ANALGESIA SYRINGE INTRAVENOUS
Status: DISCONTINUED | OUTPATIENT
Start: 2024-03-20 | End: 2024-03-21 | Stop reason: HOSPADM

## 2024-03-20 RX ORDER — BISACODYL 10 MG
10 SUPPOSITORY, RECTAL RECTAL DAILY PRN
Status: DISCONTINUED | OUTPATIENT
Start: 2024-03-20 | End: 2024-03-21 | Stop reason: HOSPADM

## 2024-03-20 RX ORDER — TRANEXAMIC ACID 650 MG/1
1950 TABLET ORAL ONCE
Status: COMPLETED | OUTPATIENT
Start: 2024-03-20 | End: 2024-03-20

## 2024-03-20 RX ORDER — OXYCODONE HYDROCHLORIDE 5 MG/1
5-10 TABLET ORAL EVERY 4 HOURS PRN
Qty: 30 TABLET | Refills: 0 | Status: SHIPPED | OUTPATIENT
Start: 2024-03-20 | End: 2024-06-19

## 2024-03-20 RX ORDER — PROPOFOL 10 MG/ML
INJECTION, EMULSION INTRAVENOUS CONTINUOUS PRN
Status: DISCONTINUED | OUTPATIENT
Start: 2024-03-20 | End: 2024-03-20

## 2024-03-20 RX ORDER — FENTANYL CITRATE 50 UG/ML
100 INJECTION, SOLUTION INTRAMUSCULAR; INTRAVENOUS
Status: DISCONTINUED | OUTPATIENT
Start: 2024-03-20 | End: 2024-03-20 | Stop reason: HOSPADM

## 2024-03-20 RX ORDER — ONDANSETRON 2 MG/ML
INJECTION INTRAMUSCULAR; INTRAVENOUS PRN
Status: DISCONTINUED | OUTPATIENT
Start: 2024-03-20 | End: 2024-03-20

## 2024-03-20 RX ORDER — DEXAMETHASONE SODIUM PHOSPHATE 10 MG/ML
INJECTION, SOLUTION INTRAMUSCULAR; INTRAVENOUS PRN
Status: DISCONTINUED | OUTPATIENT
Start: 2024-03-20 | End: 2024-03-20

## 2024-03-20 RX ORDER — ACETAMINOPHEN 325 MG/1
975 TABLET ORAL EVERY 8 HOURS
Status: DISCONTINUED | OUTPATIENT
Start: 2024-03-20 | End: 2024-03-21 | Stop reason: HOSPADM

## 2024-03-20 RX ORDER — CEFAZOLIN SODIUM/WATER 2 G/20 ML
2 SYRINGE (ML) INTRAVENOUS SEE ADMIN INSTRUCTIONS
Status: DISCONTINUED | OUTPATIENT
Start: 2024-03-20 | End: 2024-03-20 | Stop reason: HOSPADM

## 2024-03-20 RX ORDER — ASPIRIN 81 MG/1
81 TABLET ORAL 2 TIMES DAILY
Status: DISCONTINUED | OUTPATIENT
Start: 2024-03-20 | End: 2024-03-21 | Stop reason: HOSPADM

## 2024-03-20 RX ORDER — AMLODIPINE BESYLATE 10 MG/1
10 TABLET ORAL DAILY
Status: DISCONTINUED | OUTPATIENT
Start: 2024-03-21 | End: 2024-03-21 | Stop reason: HOSPADM

## 2024-03-20 RX ORDER — PHENYLEPHRINE HCL IN 0.9% NACL 50MG/250ML
PLASTIC BAG, INJECTION (ML) INTRAVENOUS
Status: DISCONTINUED
Start: 2024-03-20 | End: 2024-03-20 | Stop reason: HOSPADM

## 2024-03-20 RX ORDER — NALOXONE HYDROCHLORIDE 0.4 MG/ML
0.2 INJECTION, SOLUTION INTRAMUSCULAR; INTRAVENOUS; SUBCUTANEOUS
Status: DISCONTINUED | OUTPATIENT
Start: 2024-03-20 | End: 2024-03-21 | Stop reason: HOSPADM

## 2024-03-20 RX ORDER — ACETAMINOPHEN 325 MG/1
650 TABLET ORAL EVERY 4 HOURS PRN
Status: DISCONTINUED | OUTPATIENT
Start: 2024-03-23 | End: 2024-03-21 | Stop reason: HOSPADM

## 2024-03-20 RX ORDER — SODIUM CHLORIDE, SODIUM LACTATE, POTASSIUM CHLORIDE, CALCIUM CHLORIDE 600; 310; 30; 20 MG/100ML; MG/100ML; MG/100ML; MG/100ML
INJECTION, SOLUTION INTRAVENOUS CONTINUOUS
Status: DISCONTINUED | OUTPATIENT
Start: 2024-03-20 | End: 2024-03-21 | Stop reason: HOSPADM

## 2024-03-20 RX ORDER — ONDANSETRON 2 MG/ML
4 INJECTION INTRAMUSCULAR; INTRAVENOUS EVERY 30 MIN PRN
Status: DISCONTINUED | OUTPATIENT
Start: 2024-03-20 | End: 2024-03-20 | Stop reason: HOSPADM

## 2024-03-20 RX ORDER — NALOXONE HYDROCHLORIDE 0.4 MG/ML
0.1 INJECTION, SOLUTION INTRAMUSCULAR; INTRAVENOUS; SUBCUTANEOUS
Status: DISCONTINUED | OUTPATIENT
Start: 2024-03-20 | End: 2024-03-20 | Stop reason: HOSPADM

## 2024-03-20 RX ORDER — POLYETHYLENE GLYCOL 3350 17 G/17G
17 POWDER, FOR SOLUTION ORAL DAILY
Status: DISCONTINUED | OUTPATIENT
Start: 2024-03-21 | End: 2024-03-21 | Stop reason: HOSPADM

## 2024-03-20 RX ORDER — HYDROMORPHONE HCL IN WATER/PF 6 MG/30 ML
0.2 PATIENT CONTROLLED ANALGESIA SYRINGE INTRAVENOUS EVERY 5 MIN PRN
Status: DISCONTINUED | OUTPATIENT
Start: 2024-03-20 | End: 2024-03-20 | Stop reason: HOSPADM

## 2024-03-20 RX ORDER — HYDROMORPHONE HCL IN WATER/PF 6 MG/30 ML
0.4 PATIENT CONTROLLED ANALGESIA SYRINGE INTRAVENOUS EVERY 5 MIN PRN
Status: DISCONTINUED | OUTPATIENT
Start: 2024-03-20 | End: 2024-03-20 | Stop reason: HOSPADM

## 2024-03-20 RX ORDER — ASPIRIN 81 MG/1
81 TABLET ORAL 2 TIMES DAILY
Qty: 60 TABLET | Refills: 0 | Status: SHIPPED | OUTPATIENT
Start: 2024-03-20 | End: 2024-07-03

## 2024-03-20 RX ORDER — AMOXICILLIN 250 MG
1-2 CAPSULE ORAL 2 TIMES DAILY
Qty: 30 TABLET | Refills: 0 | Status: SHIPPED | OUTPATIENT
Start: 2024-03-20 | End: 2024-07-03

## 2024-03-20 RX ORDER — SODIUM CHLORIDE, SODIUM LACTATE, POTASSIUM CHLORIDE, CALCIUM CHLORIDE 600; 310; 30; 20 MG/100ML; MG/100ML; MG/100ML; MG/100ML
INJECTION, SOLUTION INTRAVENOUS CONTINUOUS
Status: DISCONTINUED | OUTPATIENT
Start: 2024-03-20 | End: 2024-03-20 | Stop reason: HOSPADM

## 2024-03-20 RX ORDER — ONDANSETRON 4 MG/1
4 TABLET, ORALLY DISINTEGRATING ORAL EVERY 30 MIN PRN
Status: DISCONTINUED | OUTPATIENT
Start: 2024-03-20 | End: 2024-03-20 | Stop reason: HOSPADM

## 2024-03-20 RX ORDER — NALOXONE HYDROCHLORIDE 0.4 MG/ML
0.4 INJECTION, SOLUTION INTRAMUSCULAR; INTRAVENOUS; SUBCUTANEOUS
Status: DISCONTINUED | OUTPATIENT
Start: 2024-03-20 | End: 2024-03-21 | Stop reason: HOSPADM

## 2024-03-20 RX ORDER — HYDROMORPHONE HCL IN WATER/PF 6 MG/30 ML
0.4 PATIENT CONTROLLED ANALGESIA SYRINGE INTRAVENOUS
Status: DISCONTINUED | OUTPATIENT
Start: 2024-03-20 | End: 2024-03-21 | Stop reason: HOSPADM

## 2024-03-20 RX ORDER — CEFADROXIL 500 MG/1
500 CAPSULE ORAL 2 TIMES DAILY
Qty: 14 CAPSULE | Refills: 0 | Status: SHIPPED | OUTPATIENT
Start: 2024-03-20 | End: 2024-07-03

## 2024-03-20 RX ORDER — OXYCODONE HYDROCHLORIDE 5 MG/1
10 TABLET ORAL EVERY 4 HOURS PRN
Status: DISCONTINUED | OUTPATIENT
Start: 2024-03-20 | End: 2024-03-21 | Stop reason: HOSPADM

## 2024-03-20 RX ORDER — AMOXICILLIN 250 MG
1 CAPSULE ORAL 2 TIMES DAILY
Status: DISCONTINUED | OUTPATIENT
Start: 2024-03-20 | End: 2024-03-21 | Stop reason: HOSPADM

## 2024-03-20 RX ORDER — LIDOCAINE 40 MG/G
CREAM TOPICAL
Status: DISCONTINUED | OUTPATIENT
Start: 2024-03-20 | End: 2024-03-20 | Stop reason: HOSPADM

## 2024-03-20 RX ORDER — FENTANYL CITRATE 50 UG/ML
25 INJECTION, SOLUTION INTRAMUSCULAR; INTRAVENOUS EVERY 5 MIN PRN
Status: DISCONTINUED | OUTPATIENT
Start: 2024-03-20 | End: 2024-03-20 | Stop reason: HOSPADM

## 2024-03-20 RX ORDER — HYDROXYZINE HYDROCHLORIDE 10 MG/1
10 TABLET, FILM COATED ORAL EVERY 6 HOURS PRN
Qty: 30 TABLET | Refills: 0 | Status: SHIPPED | OUTPATIENT
Start: 2024-03-20 | End: 2024-06-19

## 2024-03-20 RX ORDER — UBIDECARENONE 75 MG
100 CAPSULE ORAL DAILY
Status: DISCONTINUED | OUTPATIENT
Start: 2024-03-21 | End: 2024-03-21 | Stop reason: HOSPADM

## 2024-03-20 RX ORDER — ACETAMINOPHEN 325 MG/1
650 TABLET ORAL EVERY 4 HOURS PRN
Qty: 100 TABLET | Refills: 0 | Status: SHIPPED | OUTPATIENT
Start: 2024-03-20

## 2024-03-20 RX ORDER — ONDANSETRON 2 MG/ML
4 INJECTION INTRAMUSCULAR; INTRAVENOUS EVERY 6 HOURS PRN
Status: DISCONTINUED | OUTPATIENT
Start: 2024-03-20 | End: 2024-03-21 | Stop reason: HOSPADM

## 2024-03-20 RX ORDER — LIDOCAINE 40 MG/G
CREAM TOPICAL
Status: DISCONTINUED | OUTPATIENT
Start: 2024-03-20 | End: 2024-03-21 | Stop reason: HOSPADM

## 2024-03-20 RX ORDER — CEFAZOLIN SODIUM/WATER 2 G/20 ML
2 SYRINGE (ML) INTRAVENOUS
Status: COMPLETED | OUTPATIENT
Start: 2024-03-20 | End: 2024-03-20

## 2024-03-20 RX ORDER — FENTANYL CITRATE 50 UG/ML
50 INJECTION, SOLUTION INTRAMUSCULAR; INTRAVENOUS
Status: DISCONTINUED | OUTPATIENT
Start: 2024-03-20 | End: 2024-03-20 | Stop reason: HOSPADM

## 2024-03-20 RX ORDER — ALBUTEROL SULFATE 90 UG/1
2 AEROSOL, METERED RESPIRATORY (INHALATION) EVERY 4 HOURS PRN
Status: DISCONTINUED | OUTPATIENT
Start: 2024-03-20 | End: 2024-03-21 | Stop reason: HOSPADM

## 2024-03-20 RX ORDER — FENTANYL CITRATE 50 UG/ML
50 INJECTION, SOLUTION INTRAMUSCULAR; INTRAVENOUS EVERY 5 MIN PRN
Status: DISCONTINUED | OUTPATIENT
Start: 2024-03-20 | End: 2024-03-20 | Stop reason: HOSPADM

## 2024-03-20 RX ORDER — ONDANSETRON 4 MG/1
4 TABLET, ORALLY DISINTEGRATING ORAL EVERY 6 HOURS PRN
Status: DISCONTINUED | OUTPATIENT
Start: 2024-03-20 | End: 2024-03-21 | Stop reason: HOSPADM

## 2024-03-20 RX ORDER — PROCHLORPERAZINE MALEATE 5 MG
5 TABLET ORAL EVERY 6 HOURS PRN
Status: DISCONTINUED | OUTPATIENT
Start: 2024-03-20 | End: 2024-03-21 | Stop reason: HOSPADM

## 2024-03-20 RX ADMIN — BUPIVACAINE HYDROCHLORIDE 10 ML: 5 INJECTION, SOLUTION EPIDURAL; INTRACAUDAL; PERINEURAL at 08:45

## 2024-03-20 RX ADMIN — PHENYLEPHRINE HYDROCHLORIDE 200 MCG: 10 INJECTION INTRAVENOUS at 10:23

## 2024-03-20 RX ADMIN — OXYCODONE 5 MG: 5 TABLET ORAL at 21:20

## 2024-03-20 RX ADMIN — Medication 5 MG: at 10:33

## 2024-03-20 RX ADMIN — PHENYLEPHRINE HYDROCHLORIDE 100 MCG: 10 INJECTION INTRAVENOUS at 10:15

## 2024-03-20 RX ADMIN — SENNOSIDES AND DOCUSATE SODIUM 1 TABLET: 8.6; 5 TABLET ORAL at 20:17

## 2024-03-20 RX ADMIN — OXYCODONE 5 MG: 5 TABLET ORAL at 16:53

## 2024-03-20 RX ADMIN — PHENYLEPHRINE HYDROCHLORIDE 200 MCG: 10 INJECTION INTRAVENOUS at 10:39

## 2024-03-20 RX ADMIN — PROPOFOL 75 MCG/KG/MIN: 10 INJECTION, EMULSION INTRAVENOUS at 09:37

## 2024-03-20 RX ADMIN — DEXAMETHASONE SODIUM PHOSPHATE 10 MG: 10 INJECTION, SOLUTION INTRAMUSCULAR; INTRAVENOUS at 09:46

## 2024-03-20 RX ADMIN — MIDAZOLAM HYDROCHLORIDE 1 MG: 1 INJECTION, SOLUTION INTRAMUSCULAR; INTRAVENOUS at 08:44

## 2024-03-20 RX ADMIN — ASPIRIN 81 MG: 81 TABLET, COATED ORAL at 20:17

## 2024-03-20 RX ADMIN — SODIUM CHLORIDE, POTASSIUM CHLORIDE, SODIUM LACTATE AND CALCIUM CHLORIDE: 600; 310; 30; 20 INJECTION, SOLUTION INTRAVENOUS at 07:41

## 2024-03-20 RX ADMIN — PHENYLEPHRINE HYDROCHLORIDE 100 MCG: 10 INJECTION INTRAVENOUS at 10:03

## 2024-03-20 RX ADMIN — FENTANYL CITRATE 50 MCG: 50 INJECTION, SOLUTION INTRAMUSCULAR; INTRAVENOUS at 08:43

## 2024-03-20 RX ADMIN — Medication 2 G: at 09:22

## 2024-03-20 RX ADMIN — FENTANYL CITRATE 50 MCG: 50 INJECTION, SOLUTION INTRAMUSCULAR; INTRAVENOUS at 11:15

## 2024-03-20 RX ADMIN — ACETAMINOPHEN 975 MG: 325 TABLET ORAL at 12:17

## 2024-03-20 RX ADMIN — OXYCODONE 5 MG: 5 TABLET ORAL at 12:17

## 2024-03-20 RX ADMIN — CEFAZOLIN 1 G: 1 INJECTION, POWDER, FOR SOLUTION INTRAMUSCULAR; INTRAVENOUS at 16:53

## 2024-03-20 RX ADMIN — SODIUM CHLORIDE, POTASSIUM CHLORIDE, SODIUM LACTATE AND CALCIUM CHLORIDE: 600; 310; 30; 20 INJECTION, SOLUTION INTRAVENOUS at 10:26

## 2024-03-20 RX ADMIN — ONDANSETRON 4 MG: 2 INJECTION INTRAMUSCULAR; INTRAVENOUS at 09:47

## 2024-03-20 RX ADMIN — FENTANYL CITRATE 50 MCG: 50 INJECTION, SOLUTION INTRAMUSCULAR; INTRAVENOUS at 11:21

## 2024-03-20 RX ADMIN — ACETAMINOPHEN 975 MG: 325 TABLET ORAL at 20:16

## 2024-03-20 RX ADMIN — TRANEXAMIC ACID 1950 MG: 650 TABLET ORAL at 07:15

## 2024-03-20 RX ADMIN — PHENYLEPHRINE HYDROCHLORIDE 0.5 MCG/KG/MIN: 10 INJECTION INTRAVENOUS at 09:38

## 2024-03-20 RX ADMIN — FENTANYL CITRATE 25 MCG: 50 INJECTION, SOLUTION INTRAMUSCULAR; INTRAVENOUS at 11:33

## 2024-03-20 RX ADMIN — Medication 5 MG: at 10:10

## 2024-03-20 ASSESSMENT — ACTIVITIES OF DAILY LIVING (ADL)
ADLS_ACUITY_SCORE: 30
ADLS_ACUITY_SCORE: 20
ADLS_ACUITY_SCORE: 18
ADLS_ACUITY_SCORE: 22
ADLS_ACUITY_SCORE: 26
ADLS_ACUITY_SCORE: 22
ADLS_ACUITY_SCORE: 20
ADLS_ACUITY_SCORE: 26
ADLS_ACUITY_SCORE: 20
ADLS_ACUITY_SCORE: 26
ADLS_ACUITY_SCORE: 20
ADLS_ACUITY_SCORE: 26
ADLS_ACUITY_SCORE: 20
ADLS_ACUITY_SCORE: 30
ADLS_ACUITY_SCORE: 20

## 2024-03-20 NOTE — ANESTHESIA CARE TRANSFER NOTE
Patient: Karen Santiago    Procedure: Procedure(s):  RIGHT TOTAL KNEE ARTHROPLASTY       Diagnosis: Primary osteoarthritis of right knee [M17.11]  Diagnosis Additional Information: No value filed.    Anesthesia Type:   No value filed.     Note:    Oropharynx: oropharynx clear of all foreign objects  Level of Consciousness: awake  Oxygen Supplementation: face mask  Level of Supplemental Oxygen (L/min / FiO2): 8  Independent Airway: airway patency satisfactory and stable  Dentition: dentition unchanged  Vital Signs Stable: post-procedure vital signs reviewed and stable  Report to RN Given: handoff report given  Patient transferred to: PACU    Handoff Report: Identifed the Patient, Identified the Reponsible Provider, Reviewed the pertinent medical history, Discussed the surgical course, Reviewed Intra-OP anesthesia mangement and issues during anesthesia, Set expectations for post-procedure period and Allowed opportunity for questions and acknowledgement of understanding    Vitals:  Vitals Value Taken Time   /60 03/20/24 1108   Temp 36.6  C (97.9  F) 03/20/24 1105   Pulse 77 03/20/24 1108   Resp 19 03/20/24 1108   SpO2 100 % 03/20/24 1108   Vitals shown include unfiled device data.    Electronically Signed By: KOTA Murillo CRNA  March 20, 2024  11:09 AM

## 2024-03-20 NOTE — PROGRESS NOTES
03/20/24 1500   Appointment Info   Signing Clinician's Name / Credentials (PT) Tim Guardado, PT, DPT   Quick Adds   Quick Adds Certification   Living Environment   People in Home alone   Current Living Arrangements house   Home Accessibility stairs to enter home   Number of Stairs, Main Entrance 2   Living Environment Comments Dtr and son will be staying with pt for first week   Self-Care   Usual Activity Tolerance good   Current Activity Tolerance moderate   Equipment Currently Used at Home none   Fall history within last six months no   General Information   Onset of Illness/Injury or Date of Surgery 03/20/24   Referring Physician Dr. Mckeon   Patient/Family Therapy Goals Statement (PT) Decrease pain with mobility   Pertinent History of Current Problem (include personal factors and/or comorbidities that impact the POC) s/p R TKA   Existing Precautions/Restrictions weight bearing   Weight-Bearing Status - LLE full weight-bearing   Weight-Bearing Status - RLE weight-bearing as tolerated   Range of Motion (ROM)   ROM Comment WFL, decreased RLE s/p TKA   Strength (Manual Muscle Testing)   Strength Comments WFL   Bed Mobility   Bed Mobility supine-sit   Supine-Sit [place] (Bed Mobility) moderate assist (50% patient effort)   Impairments Contributing to Impaired Bed Mobility pain;decreased strength   Assistive Device (Bed Mobility) bed rails   Transfers   Transfers sit-stand transfer   Sit-Stand Transfer   Sit-Stand [place] (Transfers) contact guard   Assistive Device (Sit-Stand Transfers) walker, front-wheeled   Gait/Stairs (Locomotion)   [place] Level (Gait) contact guard   Assistive Device (Gait) walker, front-wheeled   Distance in Feet (Gait) 10'   Pattern (Gait) step-to   Deviations/Abnormal Patterns (Gait) enid decreased;stride length decreased   Clinical Impression   Criteria for Skilled Therapeutic Intervention Yes, treatment indicated   PT Diagnosis (PT) impaired functional mobility    Influenced by the following impairments pain, decreased ROM,, decreased strength   Functional limitations due to impairments gait, stairs, transfers   Clinical Presentation (PT Evaluation Complexity) stable   Clinical Presentation Rationale pt presents as medically diagnosed   Clinical Decision Making (Complexity) low complexity   Planned Therapy Interventions (PT) gait training;home exercise program;patient/family education;ROM (range of motion);stair training;strengthening;transfer training   Risk & Benefits of therapy have been explained care plan/treatment goals reviewed;patient   PT Total Evaluation Time   PT Eval, Low Complexity Minutes (09671) 10   Therapy Certification   Start of care date 03/20/24   Certification date from 03/20/24   Certification date to 04/20/24   Medical Diagnosis s/p TKA   Physical Therapy Goals   PT Frequency One time eval and treatment only   PT Predicted Duration/Target Date for Goal Attainment 03/22/24   PT Goals PT Goal 1;Gait;Transfers;Stairs   PT: Transfers Modified independent;Sit to/from stand;Assistive device   PT: Gait Modified independent;Rolling walker;100 feet   PT: Stairs Supervision/stand-by assist;2 stairs;Rail on both sides   PT: Goal 1 Independent with TKA HEP   Interventions   Interventions Quick Adds Therapeutic Activity;Gait Training   Therapeutic Procedure/Exercise   Ther. Procedure: strength, endurance, ROM, flexibillity Minutes (78658) 3   Treatment Detail/Skilled Intervention Briefly reviewed TKA HEP but will formally go through tomorrow.   Therapeutic Activity   Therapeutic Activities: dynamic activities to improve functional performance Minutes (89483) 12   Symptoms Noted During/After Treatment Increased pain   Treatment Detail/Skilled Intervention Supine to sit Mod A with pt pulling up on this writer with BUEs, moving LEs well, SBA at EOB. Increased time due to first time being up. Sit to/from stand x 2 SBA, cueing for safe hand placement and LE  positioning. Educated on pain control, icing, POC, role of therapies.   Gait Training   Gait Training Minutes (03903) 5   Symptoms Noted During/After Treatment (Gait Training) increased pain   Treatment Detail/Skilled Intervention Pt amb around room and then briefly into the halls with FWW and CGA. No LOB or adverse s/s. Encouraged amb with nursing later tonight. Educated on technique, PT managing IV. Weightshifted briefly in standing before amb.   Distance in Feet 20', 50'   [place] Level (Gait Training) contact guard   Physical Assistance Level (Gait Training) verbal cues;supervision   Weight Bearing (Gait Training) weight-bearing as tolerated   Assistive Device (Gait Training) rolling walker   Pattern Analysis (Gait Training) swing-to gait   Gait Analysis Deviations decreased [place]id;decreased step length   Impairments (Gait Analysis/Training) pain;strength decreased   PT Discharge Planning   PT Plan Stair trial, increase amb, TKA HEP   PT Discharge Recommendation (DC Rec) other (see comments)   PT Rationale for DC Rec defer to ortho   PT Brief overview of current status CGA amb 50' with FWW, transferring well   PT Equipment Needed at Discharge   (No needs)   [facility]  OUTPATIENT PHYSICAL THERAPY EVALUATION  PLAN OF TREATMENT FOR OUTPATIENT REHABILITATION  (COMPLETE FOR INITIAL CLAIMS ONLY)  Patient's Last Name, First Name, M.I.  YOB: 1934  Karen Santiago                        Provider's Name  [facility] Medical Record No.  [id number]                             Onset Date:  03/20/24   Start of Care Date:  03/20/24   Type:     _X_PT   ___OT   ___SLP Medical Diagnosis:  s/p TKA              PT Diagnosis:  impaired functional mobility Visits from SOC:  1     See note for plan of treatment, functional goals and certification details    I CERTIFY THE NEED FOR THESE SERVICES FURNISHED UNDER        THIS PLAN OF TREATMENT AND  WHILE UNDER MY CARE     (Physician co-signature of this document indicates review and certification of the therapy plan).

## 2024-03-20 NOTE — OP NOTE
Operative Note    Name:  Karen Santiago  Location: [facility] Main OR  Procedure Date:  3/20/2024  PCP:  Howard Luu      Procedure(s):  RIGHT TOTAL KNEE ARTHROPLASTY     Implant Name Type Inv. Item Serial No.  Lot No. LRB No. Used Action   BONE CEMENT SIMPLEX W/TOBRAMYCIN 6197-9-001 - [id number] Cement, Bone BONE CEMENT SIMPLEX W/TOBRAMYCIN 6197-9-001  [employer] [id number] Right 1 Implanted   BONE CEMENT SIMPLEX W/TOBRAMYCIN 6197-9-001 - [id number] Cement, Bone BONE CEMENT SIMPLEX W/TOBRAMYCIN 6197-9-001  [employer] [id number] Right 1 Implanted   IMP COMP FEM STRK TRIATHLN CR RT 6 5510-F-602 - [id number] Total Joint Component/Insert IMP COMP FEM STRK TRIATHLN CR RT 6 5510-F-602  [employer] [id number] Right 1 Implanted   IMP BASEPLATE TIBIAL HOWM TRI 7 5520-B-700 - [id number] Total Joint Component/Insert IMP BASEPLATE TIBIAL HOWM TRI 7 5520-B-700  [employer] [id number] Right 1 Implanted   TRIATHLON TIBIAL BEARING INSERT-CS   5531-P-713 [employer] [id number] Right 1 Implanted       Pre-Procedure Diagnosis:  Primary osteoarthritis of right knee [M17.11]     Post-Procedure Diagnosis:    same    Surgeon(s):  Savanna Mayer PA-C Wickum, Daren J, MD      Assistants:  Required for patient positioning, intraoperative retraction, patient safety, and wound closure.    Anesthesia Type:  Spinal with Block     Findings:  Arthritis    Operative Report:      After satisfactory infiltration of regional block anesthetic in the preoperative holding area, the patient was taken to the operating room.  Spinal anesthesia was administered.  The patient's operative extremity was then prepped and draped sterile.  A timeout was then taken to ensure proper surgical site.  A medial parapatellar arthrotomy was then performed.  The menisci and fat pad were sacrificed.  The distal femoral cut was made taking 8mm off, 3 degrees valgus.  Osteophytes were then removed.  Proximal tibia cut was then made.  The proper  rotation of the distal femur was then set, sized, chamfer cuts were made.  At this point an intraoperative standardized cocktail was infiltrated around the periarticular soft tissues.  Trials were implanted.  Patella was everted and a lateral facetectomy performed.  Trials showed excellent flexion and extension gaps, excellent range of motion with excellent patellar tracking.  Trials were removed and the tibial keel punch was then made.  The wound then copiously irrigated.  Components then impacted into place.  The wound irrigated once more.  The wound then closed in layers: #1 Vicryl for the extensor mechanism, 2-0 Vicryl for the subcutaneous tissues, 3-0 Monocryl for the skin followed by Dermabond.  Sterile dressings were applied.  The patient was then awakened and taken to the PAR stable.    Plan:  Initiate DVT prophylaxis protocol including early ambulation, mechanical and chemical prophylaxis.    Estimated Blood Loss:   50cc       Complications:    None    Kishor Mckeon MD     Date: 3/20/2024  Time: 10:38 AM

## 2024-03-20 NOTE — PROGRESS NOTES
Patient vital signs are at baseline: Yes  Patient able to ambulate as they were prior to admission or with assist devices provided by therapies during their stay:  No,  Reason:  not oob yet  Patient MUST void prior to discharge:  yes urostomy in place  Patient able to tolerate oral intake:  Yes  Pain has adequate pain control using Oral analgesics:  Yes pt reports minimal pain when arrived to the floor  Does patient have an identified :  Yes daughter at bedside  Has goal D/C date and time been discussed with patient:  No,  Reason:  just got to unit post op    Pt arrived to unit at 1445. Pt a/o x 4. Denies N/V no CP or SOB. CMS intact. Able to make needs known. Alarms on for safety.    Destiney Mullins RN  [phone]

## 2024-03-20 NOTE — PHARMACY-ADMISSION MEDICATION HISTORY
Pharmacist Admission Medication History    Admission medication history is complete. The information provided in this note is only as accurate as the sources available at the time of the update.    Information Source(s): Patient and CareEverywhere/SureScripts via in-person    Pertinent Information: n/a    Allergies reviewed with patient and updates made in EHR: yes    Medication History Completed By: Esha Warner PharmD 3/20/2024 7:51 AM    PTA Med List   Medication Sig Note Last Dose    acetaminophen 500 MG CAPS Take 1,000 mg by mouth every 6 hours as needed 3/20/2024: Usually takes at least once daily 3/19/2024 at pm    albuterol (PROAIR HFA/PROVENTIL HFA/VENTOLIN HFA) 108 (90 Base) MCG/ACT inhaler INHALE TWO PUFFS BY MOUTH EVERY FOUR HOURS AS NEEDED FOR WHEEZING  3/20/2024 at am, has with    amLODIPine (NORVASC) 10 MG tablet Take 1 tablet (10 mg) by mouth daily  3/20/2024 at am    losartan (COZAAR) 100 MG tablet Take 1 tablet (100 mg) by mouth daily  3/19/2024 at am    mometasone-formoterol (DULERA) 100-5 MCG/ACT inhaler Inhale 2 puffs into the lungs 2 times daily  3/20/2024 at am x1, has with    Multiple Vitamins-Iron (MULTI-VITAMIN  /IRON) TABS Take 1 tablet by mouth daily  Past Week    vitamin B complex with vitamin C (STRESS TAB) tablet Take 1 tablet by mouth every evening  3/19/2024 at pm    vitamin B-12 (CYANOCOBALAMIN) 100 MCG tablet Take 100 mcg by mouth daily  3/20/2024 at am    vitamin D3 (CHOLECALCIFEROL) 50 mcg (2000 units) tablet Take 2 tablets by mouth daily  3/20/2024 at am

## 2024-03-20 NOTE — CONSULTS
INTERNAL MEDICINE CONSULT    Physician requesting consult: Dr. Mckeon  Reason for consult: Hypertension COPD    Assessment and Plan:  COPD  Continue home inhalers  On Dulera, albuterol  Encourage I-S, ambulation    Hypertension  On losartan, amlodipine holding parameters written    History of abnormal stress test  Cleared by cardiology preop    History of bladder cancer with ileal conduit  Monitor for urinary retention    History of B12 deficiency  On supplements    History of lung cancer status postradiation    History of iron deficiency anemia  Hemoglobin is 12.6    right knee osteoarthritis s/p TKA: Postop day #0.  Continue PT OT, pain control, DVT prophylaxis per orthopedic surgery.  Encourage incentive spirometry, monitor hemoglobin    HPI:     Karen Santiago is a 89 year old male with past history significant for hypertension, COPD, iron deficiency anemia,admitted postoperatively after he underwent a total knee arthroplasty.  Tolerated procedure well.  Estimated blood loss 50 mL. Currently denies any complaints such as nausea vomiting shortness of breath chest pain abdominal pain. Denies any previous history of blood clots.  Preop history and physical reviewed.     Medical History  Past Medical History:   Diagnosis Date    Cancer (H)     bladder    COPD (chronic obstructive pulmonary disease) (H)     Hypertension            Patient Active Problem List    Diagnosis Date Noted    Orthopedic aftercare for joint replacement 03/20/2024     Priority: Medium    Iron deficiency anemia, unspecified iron deficiency anemia type 09/27/2022     Priority: Medium    Chronic kidney disease, stage 3a (H) 09/27/2022     Priority: Medium    Anemia, unspecified type 09/27/2022     Priority: Medium    Benign localized hyperplasia of prostate 09/26/2022     Priority: Medium    Osteoporosis 09/26/2022     Priority: Medium    Hydronephrosis concurrent with and due to ureteral stricture 06/02/2021     Priority: Medium    Other  specified disorders of kidney and ureter 04/15/2021     Priority: Medium     Formatting of this note might be different from the original.  Added automatically from request for surgery [id number]        Squamous Cell Carcinoma Of The Skin      Priority: Medium     Created by Conversion  [facility] Annotation: Oct  6 2014 10:12AM - Raffi Guevara: left cheek   area, removed 10/2 but one lateral margin postive for remaining in situ   squamous cell ca- see report  Replacement Utility updated for latest IMO load        (Presumed) malignant neoplasm of upper lobe of left lung (H) 11/12/2018     Priority: Medium    Multiple pulmonary nodules 06/01/2018     Priority: Medium    COPD (chronic obstructive pulmonary disease) (H) 12/20/2017     Priority: Medium     Dx at [facility] before 2013 by spirometry and they started the Combivent   inhaler use        B12 deficiency      Priority: Medium     Created by Conversion        S/P ileal conduit (H) 12/21/2016     Priority: Medium     For transitional cell cancer of bladder in 2002        Primary Osteoarthritis Of The Lumbar Vertebrae      Priority: Medium     Created by Conversion        Obesity      Priority: Medium     Created by Conversion        Benign Essential Hypertension      Priority: Medium     Created by Conversion        Malignant neoplasm of bladder (H) 10/02/2002     Priority: Medium    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     Past Surgical History:   Procedure Laterality Date    [facility] REMOVAL TESTIS,SIMPLE      Description: Orchiectomy Left;  Recorded: 04/26/2010;   Comments: for benign interstitial tumor    [facility] CYSTECTOMY,ILEAL CONDUIT/SIGMOID BLADDER      Description: Complete Bladder Cystectomy With Ureteroileal Conduit;  Proc Date: 01/01/2002;  Comments: cystoprostatectomy for squamous cell carcinoma per [facility] REMV PROSTATE,RETROPUBIC,SUBTOTAL      Description: Prostatectomy, Retropubic;  Recorded: 05/19/2009;  Comments: 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 per [facility] TOTAL HIP ARTHROPLASTY      Description: Total Hip Replacement;  Proc Date: 10/17/2012;    [facility] REPAIR UMBILICAL IRLANDA,<6Y/O,REDUC      Description: Umbilical Hernia Repair;  Recorded: 04/26/2010;       Allergies  No Known Allergies    Prior to Admission Medications   Medications Prior to Admission   Medication Sig Dispense Refill Last Dose    albuterol (PROAIR HFA/PROVENTIL HFA/VENTOLIN HFA) 108 (90 Base) MCG/ACT inhaler INHALE TWO PUFFS BY MOUTH EVERY FOUR HOURS AS NEEDED FOR WHEEZING 8.5 g 5 3/20/2024 at am, has with    amLODIPine (NORVASC) 10 MG tablet Take 1 tablet (10 mg) by mouth daily 90 tablet 3 3/20/2024 at am    losartan (COZAAR) 100 MG tablet Take 1 tablet (100 mg) by mouth daily 90 tablet 3 3/19/2024 at am    mometasone-formoterol (DULERA) 100-5 MCG/ACT inhaler Inhale 2 puffs into the lungs 2 times daily 13 g 5 3/20/2024 at am x1, has with    Multiple Vitamins-Iron (MULTI-VITAMIN  /IRON) TABS Take 1 tablet by mouth daily   Past Week    vitamin B complex with vitamin C (STRESS TAB) tablet Take 1 tablet by mouth every evening   3/19/2024 at pm    vitamin B-12 (CYANOCOBALAMIN) 100 MCG tablet Take 100 mcg by mouth daily   3/20/2024 at am    vitamin D3 (CHOLECALCIFEROL) 50 mcg (2000 units) tablet Take 2 tablets by mouth daily   3/20/2024 at am    [DISCONTINUED] acetaminophen 500 MG CAPS Take 1,000 mg by mouth every 6 hours as needed   3/19/2024 at pm       Social History  Reviewed, and he  reports that he quit smoking about 44 years ago. His smoking use included cigarettes.  "He has been exposed to tobacco smoke. He has never used smokeless tobacco. He reports that he does not drink alcohol and does not use drugs.  Social History     Tobacco Use    Smoking status: Former     Types: Cigarettes     Quit date: 1980     Years since quittin.2     Passive exposure: Past    Smokeless tobacco: Never   Substance Use Topics    Alcohol use: No     Comment: Alcoholic Drinks/day: 2-3 beer per month       Family History  Reviewed, and not pertinent to present problem.    Review Of Systems  As per admission HPI, all others reviewed and negative.     Physical Exam:  /66   Pulse 74   Temp (!) 96.7  F (35.9  C) (Core)   Resp 28   Ht 1.791 m (5' 10.5\")   Wt 78 kg (172 lb)   SpO2 97%   BMI 24.33 kg/m    General Appearance:  Awake Alert, orientedx3, not in any apparent distress   Head:  Normocephalic, without obvious abnormality   Eyes:  PERRL, conjunctiva/corneas clear   Throat: Oral mucosa moist   Neck: Supple,  no JVD   Lungs:   Clear to auscultation bilaterally, respirations unlabored   Chest Wall:  No tenderness   Heart:  Regular rate and rhythm, S1, S2 normal, systolic murmur   Abdomen:   Soft, non-tender, bowel sounds present,  no masses, no organomegaly   Extremities: S/p right TKA   Skin: Skin color, texture, turgor normal, no rashes or lesions   Neurologic: Alert and oriented X 3, exam on operated extremity defer to surgery     Results:  Results for orders placed or performed during the hospital encounter of 24   POC US Guidance Needle Placement     Status: None    Narrative    Ultrasound was performed as guidance to an anesthesia procedure.  Click   \"PACS images\" hyperlink below to view any stored images.  For specific   procedure details, view procedure note authored by anesthesia.     EKG:  EKG: Preop normal sinus rhythm no other acute ST-T wave changes.    Imaging:   POC US Guidance Needle Placement    Result Date: 3/20/2024  Ultrasound was performed as guidance to " "an anesthesia procedure.  Click \"PACS images\" hyperlink below to view any stored images.  For specific procedure details, view procedure note authored by anesthesia.    NM Lexiscan stress test    Result Date: 3/6/2024    1.The nuclear stress test is abnormal.   2.Negative pharmacological regadenoson ECG for ischemia.   3.There is a medium sized area of transmural infarction in the mid to basal inferoseptal segment(s) of the left ventricle.  No ischemia seen.   4.The left ventricular ejection fraction at stress is 66%.   5.Stress to rest cavity ratio is 1.38.   6.The patient is at an intermediate risk of future cardiac ischemic events.   There is no prior study for comparison.    Echocardiogram Complete    Result Date: 3/6/2024  [id number]^[address] [address]  Name: SALENA LAUREANO MRN: [id number] : 10/24/1934 Study Date: 2024 07:54 AM Age: 89 yrs Gender: Male Patient Location: [facility] Reason For Study: Syncope and collapse Ordering Physician: BUSTER YEBOAH Referring Physician: BUSTER YEBOAH Performed By: ESTEPHANIA  BSA: 2.0 m2 Height: 70 in Weight: 176 lb HR: 57 BP: 115/60 mmHg ______________________________________________________________________________ Procedure Complete Echo Adult. ______________________________________________________________________________ Interpretation Summary  The left ventricle is normal in size. The visual ejection fraction is 55-60%. No regional wall motion abnormalities noted. Diastolic Doppler findings (E/E' ratio and/or other parameters) suggest left ventricular filling pressures are normal. Normal right ventricle size and systolic function. IVC diameter <2.1 cm collapsing >50% with sniff suggests a normal RA pressure of 3 mmHg. Sinus rhythm was noted. There is no comparison study available. ______________________________________________________________________________ Left Ventricle The left ventricle " is normal in size. There is normal left ventricular wall thickness. The visual ejection fraction is 55-60%. Diastolic Doppler findings (E/E' ratio and/or other parameters) suggest left ventricular filling pressures are normal. No regional wall motion abnormalities noted.  Right Ventricle Normal right ventricle size and systolic function. TAPSE is normal, which is consistent with normal right ventricular systolic function.  Atria Normal left atrial size. Right atrial size is normal.  Mitral Valve The mitral valve leaflets are mildly thickened. There is mild (1+) mitral regurgitation. There is no mitral valve stenosis.  Tricuspid Valve The tricuspid valve is not well visualized. There is mild (1+) tricuspid regurgitation. There is no tricuspid stenosis.  Aortic Valve There is mild trileaflet aortic sclerosis. No aortic regurgitation is present. No aortic stenosis is present.  Pulmonic Valve The pulmonic valve is not well visualized. There is mild (1+) pulmonic valvular regurgitation. There is no pulmonic valvular stenosis.  Vessels The aorta root is normal. Normal size ascending aorta. IVC diameter <2.1 cm collapsing >50% with sniff suggests a normal RA pressure of 3 mmHg.  Pericardium There is no pericardial effusion.  Rhythm Sinus rhythm was noted. ______________________________________________________________________________ MMode/2D Measurements & Calculations IVSd: 0.95 cm  LVIDd: 4.2 cm LVIDs: 2.8 cm LVPWd: 1.1 cm FS: 34.1 % LV mass(C)d: 142.3 grams LV mass(C)dI: 72.0 grams/m2 Ao root diam: 3.8 cm LA dimension: 4.1 cm asc Aorta Diam: 3.7 cm LA/Ao: 1.1 LVOT diam: 2.4 cm LVOT area: 4.5 cm2 Ao root diam index Ht(cm/m): 2.1 Ao root diam index BSA (cm/m2): 1.9 Asc Ao diam index BSA (cm/m2): 1.9 Asc Ao diam index Ht(cm/m): 2.1  LA Volume Indexed (AL/bp): 21.3 ml/m2 RV Base: 3.3 cm RWT: 0.52 TAPSE: 3.0 cm  Time Measurements MM HR: 78.0 BPM  Doppler Measurements & Calculations MV E max sarah: 58.8 cm/sec MV A max sarah:  76.8 cm/sec MV E/A: 0.77 MV max PG: 3.0 mmHg MV mean P.0 mmHg MV V2 VTI: 24.8 cm MVA(VTI): 3.4 cm2 MV dec slope: 181.0 cm/sec2 MV dec time: 0.33 sec Ao V2 max: 107.0 cm/sec Ao max P.0 mmHg Ao V2 mean: 79.6 cm/sec Ao mean PG: 3.0 mmHg Ao V2 VTI: 24.3 cm DOUG(I,D): 3.4 cm2 DOUG(V,D): 3.6 cm2 LV V1 max P.9 mmHg LV V1 max: 85.1 cm/sec LV V1 VTI: 18.5 cm SV(LVOT): 83.7 ml SI(LVOT): 42.3 ml/m2 PA acc time: 0.09 sec  AV Adeel Ratio (DI): 0.80 DOUG Index (cm2/m2): 1.7 E/E': 9.0 E/E' av.0 Lateral E/e': 4.9 Medial E/e': 9.0 Peak E' Adeel: 6.5 cm/sec RV S Adeel: 14.9 cm/sec  ______________________________________________________________________________ Report approved by: Jessica Gorman 2024 11:42 AM           Stephanie Penn M.D  [facility] Service  Internal Medicine    3/20/2024  11:40 AM

## 2024-03-21 ENCOUNTER — APPOINTMENT (OUTPATIENT)
Dept: PHYSICAL THERAPY | Facility: CLINIC | Age: 89
End: 2024-03-21
Attending: ORTHOPAEDIC SURGERY
Payer: COMMERCIAL

## 2024-03-21 VITALS
WEIGHT: 172 LBS | OXYGEN SATURATION: 95 % | RESPIRATION RATE: 16 BRPM | TEMPERATURE: 98.1 F | SYSTOLIC BLOOD PRESSURE: 133 MMHG | DIASTOLIC BLOOD PRESSURE: 59 MMHG | HEIGHT: 71 IN | HEART RATE: 58 BPM | BODY MASS INDEX: 24.08 KG/M2

## 2024-03-21 LAB
FASTING STATUS PATIENT QL REPORTED: YES
GLUCOSE SERPL-MCNC: 143 MG/DL (ref 70–99)
HGB BLD-MCNC: 9.8 G/DL (ref 13.3–17.7)

## 2024-03-21 PROCEDURE — 250N000011 HC RX IP 250 OP 636: Performed by: PHYSICIAN ASSISTANT

## 2024-03-21 PROCEDURE — 97110 THERAPEUTIC EXERCISES: CPT | Mod: GP

## 2024-03-21 PROCEDURE — 250N000013 HC RX MED GY IP 250 OP 250 PS 637: Performed by: INTERNAL MEDICINE

## 2024-03-21 PROCEDURE — 999N000111 HC STATISTIC OT IP EVAL DEFER

## 2024-03-21 PROCEDURE — 36415 COLL VENOUS BLD VENIPUNCTURE: CPT | Performed by: ORTHOPAEDIC SURGERY

## 2024-03-21 PROCEDURE — 250N000013 HC RX MED GY IP 250 OP 250 PS 637: Performed by: PHYSICIAN ASSISTANT

## 2024-03-21 PROCEDURE — 250N000011 HC RX IP 250 OP 636: Mod: JZ | Performed by: ANESTHESIOLOGY

## 2024-03-21 PROCEDURE — 82947 ASSAY GLUCOSE BLOOD QUANT: CPT | Performed by: ORTHOPAEDIC SURGERY

## 2024-03-21 PROCEDURE — 85018 HEMOGLOBIN: CPT | Performed by: PHYSICIAN ASSISTANT

## 2024-03-21 PROCEDURE — 97116 GAIT TRAINING THERAPY: CPT | Mod: GP

## 2024-03-21 PROCEDURE — 99214 OFFICE O/P EST MOD 30 MIN: CPT | Performed by: INTERNAL MEDICINE

## 2024-03-21 RX ORDER — BUPIVACAINE HYDROCHLORIDE 5 MG/ML
INJECTION, SOLUTION EPIDURAL; INTRACAUDAL
Status: DISCONTINUED | OUTPATIENT
Start: 2024-03-20 | End: 2024-03-21

## 2024-03-21 RX ADMIN — ASPIRIN 81 MG: 81 TABLET, COATED ORAL at 08:11

## 2024-03-21 RX ADMIN — ACETAMINOPHEN 975 MG: 325 TABLET ORAL at 05:12

## 2024-03-21 RX ADMIN — SENNOSIDES AND DOCUSATE SODIUM 1 TABLET: 8.6; 5 TABLET ORAL at 08:12

## 2024-03-21 RX ADMIN — POLYETHYLENE GLYCOL 3350 17 G: 17 POWDER, FOR SOLUTION ORAL at 08:11

## 2024-03-21 RX ADMIN — Medication 100 MCG: at 08:11

## 2024-03-21 RX ADMIN — AMLODIPINE BESYLATE 10 MG: 10 TABLET ORAL at 08:12

## 2024-03-21 RX ADMIN — OXYCODONE 5 MG: 5 TABLET ORAL at 08:36

## 2024-03-21 RX ADMIN — OXYCODONE 5 MG: 5 TABLET ORAL at 02:01

## 2024-03-21 RX ADMIN — MEPIVACAINE HYDROCHLORIDE 3 ML: 15 INJECTION, SOLUTION EPIDURAL; INFILTRATION at 09:26

## 2024-03-21 RX ADMIN — LOSARTAN POTASSIUM 100 MG: 50 TABLET, FILM COATED ORAL at 08:11

## 2024-03-21 RX ADMIN — CEFAZOLIN 1 G: 1 INJECTION, POWDER, FOR SOLUTION INTRAMUSCULAR; INTRAVENOUS at 01:54

## 2024-03-21 ASSESSMENT — ACTIVITIES OF DAILY LIVING (ADL)
ADLS_ACUITY_SCORE: 30
DEPENDENT_IADLS:: INDEPENDENT
ADLS_ACUITY_SCORE: 30

## 2024-03-21 ASSESSMENT — COPD QUESTIONNAIRES: COPD: 1

## 2024-03-21 NOTE — PROGRESS NOTES
Pt is not appropriate for skilled OT services at this time due to PT deferring OT as Pt had no OT concerns.  Will defer to PT for Pt's current status.  Plan was discussed with PT, RN and Pt.  Will D/C current OT orders. Thank you.    3/21/2024 by Gabriella Remy, OTR, OTR/L

## 2024-03-21 NOTE — ANESTHESIA PROCEDURE NOTES
Adductor canal Procedure Note    Pre-Procedure   Staff -        Anesthesiologist:  Iain Garcia MD       Performed By: anesthesiologist       Location: pre-op       Procedure Start/Stop Times: 3/20/2024 8:45 AM and 3/20/2024 8:48 AM       Pre-Anesthestic Checklist: patient identified, IV checked, site marked, risks and benefits discussed, informed consent, monitors and equipment checked, pre-op evaluation, at physician/surgeon's request and post-op pain management  Timeout:       Correct Patient: Yes        Correct Procedure: Yes        Correct Site: Yes        Correct Position: Yes        Correct Laterality: Yes        Site Marked: Yes  Procedure Documentation  Procedure: Adductor canal       Laterality: right       Patient Position: supine       Patient Prep/Sterile Barriers: sterile gloves, mask       Skin prep: Chloraprep       Needle Type: short bevel       Needle Gauge: 20.        Needle Length (Inches): 4        Ultrasound guided       1. Ultrasound was used to identify targeted nerve, plexus, vascular marker, or fascial plane and place a needle adjacent to it in real-time.       2. Ultrasound was used to visualize the spread of anesthetic in close proximity to the above referenced structure.       3. A permanent image is entered into the patient's record.       4. The visualized anatomic structures appeared normal.       5. There were no apparent abnormal pathologic findings.    Assessment/Narrative         The placement was negative for: blood aspirated, painful injection and site bleeding       Paresthesias: No.       Bolus given via needle..        Secured via.        Insertion/Infusion Method: Single Shot       Complications: none       Injection made incrementally with aspirations every 5 mL.    Medication(s) Administered   Bupivacaine 0.5% PF (Infiltration) - Infiltration   10 mL - 3/20/2024 8:45:00 AM  Medication Administration Time: 3/20/2024 8:45 AM      FOR [facility] ([facility] ONLY:    "Pain Team Contact information: please page the Pain Team Via [facility]. Search \"Pain\". During daytime hours, please page the attending first. At night please page the resident first.      "

## 2024-03-21 NOTE — PROGRESS NOTES
Care Management Discharge Note    Discharge Date: 03/21/2024       Discharge Disposition: Home    Discharge Services: None    Discharge DME: None    Discharge Transportation:  family    Education Provided on the Discharge Plan: Yes (AVS per bedside RN)    Persons Notified of Discharge Plans: patient and daughter    Patient/Family in Agreement with the Plan: yes        Additional Information:  CM reviewed. CM met with patient and daughter (bedside). Denied needs from CM. Family will be staying with patient and assist as needed. Family will provide transportation home at discharge.       [facility] referral sent per protocol.      Gisell Sexton RN

## 2024-03-21 NOTE — PROGRESS NOTES
Patient vital signs are at baseline: Yes  Patient able to ambulate as they were prior to admission or with assist devices provided by therapies during their stay:  Yes  Patient MUST void prior to discharge:  See comment -   Pt has urosotomy in place, patent and draining urine.  Patient able to tolerate oral intake:  Yes  Pain has adequate pain control using Oral analgesics:  Yes  Does patient have an identified :  Yes  Has goal D/C date and time been discussed with patient:  Yes

## 2024-03-21 NOTE — CONSULTS
Care Management Initial Consult    General Information  Assessment completed with: Patient, daughter   Type of CM/SW Visit: Initial Assessment    Primary Care Provider verified and updated as needed: Yes   Readmission within the last 30 days: no previous admission in last 30 days              Communication Assessment  Patient's communication style: spoken language (English or Bilingual)    Hearing Difficulty or Deaf: yes   Wear Glasses or Blind: no    Cognitive  Cognitive/Neuro/Behavioral: WDL                        Living Environment:   People in home: alone     Current living Arrangements: house      Able to return to prior arrangements: yes        Family/Social Support:  Care provided by: self  Provides care for: no one  Marital Status:              Description of Support System:  children          Functional Status:  Prior to admission patient needed assistance:   Dependent ADLs:: Independent  Dependent IADLs:: Independent       Mental Health Status:  Mental Health Status: No Current Concerns       Chemical Dependency Status:  Chemical Dependency Status: No Current Concerns                          Additional Information:  CM reviewed chart. CM met with patient and briefly assessed. Daughter (bedside). Patient lives alone and is independent at baseline, including driving. Family will be staying with the patient temporarily after discharge from the hospital. Family will provide transportation home at discharge.       CM consult for Discharge Planning/Disposition.     Gisell Sexton RN

## 2024-03-21 NOTE — DISCHARGE SUMMARY
"ORTHOPEDIC DISCHARGE SUMMARY       Karen Santiago,  10/24/1934, MRN [id number]    Admission Date: 3/20/2024      Admission Diagnoses: Primary osteoarthritis of right knee [M17.11]  Orthopedic aftercare for joint replacement [Z47.1]     Discharge Date:  24     Post-operative Day:  1 Day Post-Op    Reason for Admission: The patient was admitted for the following: Procedure(s):  RIGHT TOTAL KNEE ARTHROPLASTY    BRIEF HOSPITAL COURSE   Karen Santiago is a pleasant 89 year old male who underwent the aforementioned procedure with Dr. Mckeno on 3/20. There were no intraoperative complications and the patient was transferred to the recovery room and later the orthopedic unit in stable condition. Once the patient reached the orthopedic floor our orthopedic pain protocol was implemented along with the following:    Anticoagulation Medications: ASA  Therapy: PT and OT  Activity: WBAT  Bracing: None    Consultations during Admission: Hospitalist service for medical management     COMPLICATIONS/SIGNIFICANT FINDINGS    None     DISCHARGE INFORMATION   Condition at discharge: Good  Discharge destination: Home  Patient was seen by myself on the date of discharge.    FOLLOW UP CARE   Follow up with orthopedics in 2 weeks or sooner should the need arise. Ortho will continue to manage pain control, post op anticoagulation and incision care.     Follow up with your PCP for management of chronic medical problems and to evaluate for post op medical complications including constipation, nausea/vomiting, DVT/PE, anemia, changes in blood pressure, fevers/chills, urinary retention and atelectasis/pneumonia.     Subjective   Patient is doing well on POD #1. Pain is well controlled with oral medications. Ambulating. Tolerating oral intake.     Physical Exam   /59 (BP Location: Right arm)   Pulse 58   Temp 98.1  F (36.7  C) (Oral)   Resp 16   Ht 1.791 m (5' 10.5\")   Wt 78 kg (172 lb)   SpO2 95%   BMI 24.33 kg/m  "   The patient is A&Ox3. Appears comfortable, sitting up at bedside.  Sensation is intact to light touch & equal bilaterally in the L2 through S1 dermatomes.  Calves are soft and non-tender.  Dorsiflexion and plantar flexion is intact bilaterally.  Appropriate flexion and extension of the toes bilaterally.   Brisk capillary refill in the toes bilaterally.   Palpable right dorsalis pedis pulse.  right knee dressing C/D/I.    Pertinent Results at Discharge     Hemoglobin   Date/Time Value Ref Range Status   03/21/2024 06:24 AM 9.8 (L) 13.3 - 17.7 g/dL Final   03/12/2024 10:53 AM 12.6 (L) 13.3 - 17.7 g/dL Final   02/14/2024 11:06 AM 12.6 (L) 13.3 - 17.7 g/dL Final     INR   Date/Time Value Ref Range Status   02/14/2024 11:06 AM 0.98 0.85 - 1.15 Final   07/16/2018 06:49 AM 1.06 0.90 - 1.10 Final     Platelet Count   Date/Time Value Ref Range Status   03/12/2024 10:53  150 - 450 10e3/uL Final   02/14/2024 11:06  150 - 450 10e3/uL Final   01/01/2024 12:00  150 - 450 10e3/uL Final       Problem List   Principal Problem:    Orthopedic aftercare for joint replacement      Sophia Gaviria PA-C/Dr. Mckeon  [facility]  [phone]  Date: 3/21/2024  Time: 9:23 AM

## 2024-03-21 NOTE — PROGRESS NOTES
[facility] Daily Progress Note    Assessment/Plan:  COPD  Continue home inhalers  On Dulera, albuterol  Encourage I-S, ambulation     Hypertension  On losartan, amlodipine holding parameters written     History of abnormal stress test  Cleared by cardiology preop     History of bladder cancer with ileal conduit  Monitor for urinary retention     History of B12 deficiency  On supplements     History of lung cancer status postradiation     History of iron deficiency anemia  Hemoglobin is 12.6     right knee osteoarthritis s/p TKA: Postop day #1.  Continue PT OT, pain control, DVT prophylaxis per orthopedic surgery.  Encourage incentive spirometry, monitor hemoglobin    Anemia:  Asymptomatic  Principal Problem:    Orthopedic aftercare for joint replacement     LOS: 0 days     Subjective:  Doing well.  Denies any complaints.  No shortness of breath, chest pain, urinary or bowel complaints.  Daughter at bedside.   acetaminophen  975 mg Oral Q8H    amLODIPine  10 mg Oral Daily    aspirin  81 mg Oral BID    vitamin B-12  100 mcg Oral Daily    losartan  100 mg Oral Daily    mometasone-formoterol  2 puff Inhalation BID    polyethylene glycol  17 g Oral Daily    povidone-iodine (Betadine) 0.28% in NaCl 0.9% 500 mL irrigation   Irrigation Once    senna-docusate  1 tablet Oral BID    sodium chloride (PF)  3 mL Intracatheter Q8H       Objective:  Vital signs in last 24 hours:  Temp:  [96.7  F (35.9  C)-98.1  F (36.7  C)] 98.1  F (36.7  C)  Pulse:  [58-89] 58  Resp:  [16-28] 16  BP: ()/(53-87) 133/59  SpO2:  [90 %-100 %] 95 %  Weight:   [unfilled]    Intake/Output last 3 shifts:  I/O last 3 completed shifts:  In: 1960 [P.O.:960; I.V.:1000]  Out: 1335 [Urine:1285; Blood:50]  Intake/Output this shift:  No intake/output data recorded.    Review of Systems:   As per subjective, all others negative.    Physical Exam:    General Appearance:  Alert, cooperative, no distress, appears stated age   Head:  Normocephalic, without obvious  abnormality, atraumatic   Lungs:   Clear to auscultation bilaterally, respirations unlabored   Chest Wall:  No tenderness or deformity   Heart:  Regular rate and rhythm, S1, S2 normal   Abdomen:   Soft, non tender, non distended, bowel sounds present, no guarding or rigidity   Extremities: Status post right TKA   Skin: Skin color, texture, turgor normal, no rashes or lesions   Neurologic: Alert and oriented X 3, Moves all 4 extremities       Lab Results:  Personally Reviewed.   Recent Results (from the past 24 hour(s))   Hemoglobin    Collection Time: 03/21/24  6:24 AM   Result Value Ref Range    Hemoglobin 9.8 (L) 13.3 - 17.7 g/dL   Glucose    Collection Time: 03/21/24  6:24 AM   Result Value Ref Range    Glucose 143 (H) 70 - 99 mg/dL    Patient Fasting > 8hrs? Yes          Stephanie Penn M.D  [facility] Service  Internal Medicine

## 2024-03-21 NOTE — ANESTHESIA PROCEDURE NOTES
"Intrathecal Procedure Note    Pre-Procedure   Staff -        Anesthesiologist:  Iain Garcia MD       Performed By: anesthesiologist       Location: OR       Procedure Start/Stop Times: 3/21/2024 9:26 AM and 3/21/2024 9:29 AM       Pre-Anesthestic Checklist: patient identified, IV checked, risks and benefits discussed, informed consent, monitors and equipment checked, pre-op evaluation and at physician/surgeon's request  Timeout:       Correct Patient: Yes        Correct Procedure: Yes        Correct Site: Yes        Correct Position: Yes   Procedure Documentation  Procedure: intrathecal       Patient Position: sitting       Patient Prep/Sterile Barriers: sterile gloves, mask, patient draped       Skin prep: Chloraprep       Insertion Site: L3-4. (right paramedian approach).       Needle Gauge: 22.        Needle Length (Inches): 3.5        Spinal Needle Type: Valerie tip       Introducer used       Introducer: 20 G       # of attempts: 2 and  # of redirects:     Assessment/Narrative         Paresthesias: No.       CSF fluid: clear.    Medication(s) Administered   1.5% Mepivacaine PF (Intrathecal) - Intrathecal   3 mL - 3/21/2024 9:26:00 AM  Medication Administration Time: 3/21/2024 9:26 AM      FOR [facility] ONLY:   Pain Team Contact information: please page the Pain Team Via [employer]. Search \"Pain\". During daytime hours, please page the attending first. At night please page the resident first.      "

## 2024-03-21 NOTE — ANESTHESIA PREPROCEDURE EVALUATION
Anesthesia Pre-Procedure Evaluation    Patient: Karen Santiago   MRN: [id number] : 10/24/1934        Procedure : Procedure(s):  RIGHT TOTAL KNEE ARTHROPLASTY          Past Medical History:   Diagnosis Date    Cancer (H)     bladder    COPD (chronic obstructive pulmonary disease) (H)     Hypertension     Rheumatoid arthritis (H)       Past Surgical History:   Procedure Laterality Date    ARTHROPLASTY KNEE Right 3/20/2024    Procedure: RIGHT TOTAL KNEE ARTHROPLASTY;  Surgeon: Kishor Mckeon MD;  Location: [facility] Main OR    HC REMOVAL TESTIS,SIMPLE      Description: Orchiectomy Left;  Recorded: 2010;  Comments: for benign interstitial tumor    [facility] CYSTECTOMY,ILEAL CONDUIT/SIGMOID BLADDER      Description: Complete Bladder Cystectomy With Ureteroileal Conduit;  Proc Date: 2002;  Comments: cystoprostatectomy for squamous cell carcinoma per [facility] REMV PROSTATE,RETROPUBIC,SUBTOTAL      Description: Prostatectomy, Retropubic;  Recorded: 2009;  Comments: 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 per [facility] TOTAL HIP ARTHROPLASTY      Description: Total Hip Replacement;  Proc Date: 10/17/2012;    [facility] REPAIR UMBILICAL IRLANDA,<6Y/O,REDUC      Description: Umbilical Hernia Repair;  Recorded: 2010;      No Known Allergies   Social History     Tobacco Use    Smoking status: Former     Types: Cigarettes     Quit date: 1980     Years since quittin.2     Passive exposure: Past    Smokeless tobacco: Never   Substance Use Topics    Alcohol use: No     Comment: Alcoholic Drinks/day: 2-3 beer per month      Wt Readings from Last 1 Encounters:   24 78 kg (172 lb)        Anesthesia Evaluation   Pt has had prior anesthetic.     No history of anesthetic complications       ROS/MED HX  ENT/Pulmonary:     (+)                          COPD,              Neurologic:  - neg neurologic ROS     Cardiovascular:     (+)  hypertension- -   -  - -                                    "   METS/Exercise Tolerance:     Hematologic:     (+)      anemia,          Musculoskeletal:       GI/Hepatic:  - neg GI/hepatic ROS     Renal/Genitourinary:     (+) renal disease,             Endo:     (+)               Obesity,       Psychiatric/Substance Use:       Infectious Disease:       Malignancy:       Other:            Physical Exam    Airway        Mallampati: II   TM distance: > 3 FB   Neck ROM: full   Mouth opening: > 3 cm    Respiratory Devices and Support         Dental       (+) Modest Abnormalities - crowns, retainers, 1 or 2 missing teeth      Cardiovascular          Rhythm and rate: regular and normal     Pulmonary           breath sounds clear to auscultation           OUTSIDE LABS:  CBC:   Lab Results   Component Value Date    WBC 5.0 03/12/2024    WBC 11.2 (H) 02/14/2024    HGB 9.8 (L) 03/21/2024    HGB 12.6 (L) 03/12/2024    HCT 38.3 (L) 03/12/2024    HCT 38.2 (L) 02/14/2024     03/12/2024     02/14/2024     BMP:   Lab Results   Component Value Date     03/12/2024     02/14/2024    POTASSIUM 4.4 03/12/2024    POTASSIUM 4.4 02/14/2024    CHLORIDE 105 03/12/2024    CHLORIDE 106 02/14/2024    CO2 25 03/12/2024    CO2 25 02/14/2024    BUN 14.9 03/12/2024    BUN 21.2 02/14/2024    CR 0.91 03/12/2024    CR 1.00 02/14/2024     (H) 03/21/2024     (H) 03/12/2024     COAGS:   Lab Results   Component Value Date    PTT 30 02/14/2024    INR 0.98 02/14/2024     POC: No results found for: \"BGM\", \"HCG\", \"HCGS\"  HEPATIC:   Lab Results   Component Value Date    ALBUMIN 4.1 03/12/2024    PROTTOTAL 6.6 03/12/2024    ALT 12 03/12/2024    AST 19 03/12/2024    ALKPHOS 70 03/12/2024    BILITOTAL 0.3 03/12/2024     OTHER:   Lab Results   Component Value Date    ADY 9.5 03/12/2024    MAG 1.9 04/30/2021    LIPASE 38 06/23/2021    CRP 4.7 (H) 04/30/2021       Anesthesia Plan    ASA Status:  3    NPO Status:  NPO Appropriate    Anesthesia Type: Spinal.          "     Consents    Anesthesia Plan(s) and associated risks, benefits, and realistic alternatives discussed. Questions answered and patient/representative(s) expressed understanding.     - Discussed: Risks, Benefits and Alternatives for the PROCEDURE were discussed     - Discussed with:  Patient            Postoperative Care    Pain management: Peripheral nerve block (Single Shot), IV analgesics, Oral pain medications, Multi-modal analgesia.   PONV prophylaxis: Ondansetron (or other 5HT-3), Dexamethasone or Solumedrol     Comments:               Iain Garcia MD    I have reviewed the pertinent notes and labs in the chart from the past 30 days and (re)examined the patient.  Any updates or changes from those notes are reflected in this note.

## 2024-03-21 NOTE — PROGRESS NOTES
"Orthopedic Progress Note      Assessment: 1 Day Post-Op  S/P Procedure(s):  RIGHT TOTAL KNEE ARTHROPLASTY @    Plan:   - Continue PT/OT.   - Weightbearing status: WBAT.  - Anticoagulation: Asa in addition to SCDs, pedro stockings and early ambulation.  - Discharge planning: Discharge to home today.     Subjective:  Pain: Well controlled on Tylenol & oxycodone.  Nausea, Vomiting:  No  Chest pain: No  Lightheadedness, Dizziness:  No  Neuro:  Patient denies new onset numbness or paresthesias in right lower extremity     Patient is doing well on POD #1. Ambulating, tolerating oral intake, voiding & pain is controlled with oral medication. Ready for discharge. Hgb 9.8 (pre-op 12.6).     Objective:  /59 (BP Location: Right arm)   Pulse 58   Temp 98.1  F (36.7  C) (Oral)   Resp 16   Ht 1.791 m (5' 10.5\")   Wt 78 kg (172 lb)   SpO2 95%   BMI 24.33 kg/m    The patient is A&Ox3. Appears comfortable, sitting up at bedside.  Sensation is intact to light touch & equal bilaterally in the L2 through S1 dermatomes.  Calves are soft and non-tender.  Dorsiflexion and plantar flexion is intact bilaterally.  Appropriate flexion and extension of the toes bilaterally.   Brisk capillary refill in the toes bilaterally.   Palpable right dorsalis pedis pulse.  right knee dressing C/D/I.      Pertinent Labs   Lab Results: personally reviewed.   Lab Results   Component Value Date    INR 0.98 02/14/2024    INR 1.06 07/16/2018     Lab Results   Component Value Date    WBC 5.0 03/12/2024    HGB 9.8 (L) 03/21/2024    HCT 38.3 (L) 03/12/2024    MCV 94 03/12/2024     03/12/2024     Lab Results   Component Value Date     03/12/2024    CO2 25 03/12/2024         Report completed by:  Sophia Gaviria PA-C/[facility]    Date: 3/21/2024  Time: 9:20 AM    "

## 2024-03-21 NOTE — PROGRESS NOTES
Physical Therapy Discharge Summary    Reason for therapy discharge:    All goals and outcomes met, no further needs identified.    Progress towards therapy goal(s). See goals on Care Plan in [facility] electronic health record for goal details.  Goals met    Therapy recommendation(s):    Continue home exercise program.

## 2024-03-21 NOTE — ANESTHESIA POSTPROCEDURE EVALUATION
Patient: Karen Santiago    Procedure: Procedure(s):  RIGHT TOTAL KNEE ARTHROPLASTY       Anesthesia Type:  No value filed.    Note:     Postop Pain Control: Uneventful            Sign Out: Well controlled pain   PONV: No   Neuro/Psych: Uneventful            Sign Out: Acceptable/Baseline neuro status   Airway/Respiratory: Uneventful            Sign Out: Acceptable/Baseline resp. status   CV/Hemodynamics: Uneventful            Sign Out: Acceptable CV status; No obvious hypovolemia; No obvious fluid overload   Other NRE: NONE   DID A NON-ROUTINE EVENT OCCUR? No           Last vitals:  Vitals Value Taken Time   /62 03/20/24 1400   Temp 36.3  C (97.3  F) 03/20/24 1140   Pulse 67 03/20/24 1412   Resp 16 03/20/24 1330   SpO2 91 % 03/20/24 1412   Vitals shown include unfiled device data.    Electronically Signed By: Iain Garcia MD

## 2024-03-25 ENCOUNTER — PATIENT OUTREACH (OUTPATIENT)
Dept: CARE COORDINATION | Facility: CLINIC | Age: 89
End: 2024-03-25
Payer: COMMERCIAL

## 2024-03-25 NOTE — PROGRESS NOTES
Clinic Care Coordination Contact  [facility]: Post-Discharge Note  SITUATION                                                      Admission:    Admission Date: 03/20/24   Reason for Admission: Primary osteoarthritis of right knee (M17.11)  Orthopedic aftercare for joint replacement (Z47.1)  Discharge:   Discharge Date: 03/21/24  Discharge Diagnosis: RIGHT TOTAL KNEE ARTHROPLASTY    BACKGROUND                                                      Per hospital discharge summary and inpatient provider notes:   89 year old male who underwent the aforementioned procedure with Dr. Mckeon on 3/20. There were no intraoperative complications and the patient was transferred to the recovery room and later the orthopedic unit in stable condition. Once the patient reached the orthopedic floor our orthopedic pain protocol was implemented along with the following:     Anticoagulation Medications: ASA  Therapy: PT and OT  Activity: WBAT  Bracing: None     Consultations during Admission: Hospitalist service for medical management     ASSESSMENT           Discharge Assessment  How are you doing now that you are home?: daughter Kitty stated: He is doing pretty well. He did have some leg spasms over the weekend, but those have subsided.  How are your symptoms? (Red Flag symptoms escalate to triage hotline per guidelines): Improved  Do you feel your condition is stable enough to be safe at home until your provider visit?: Yes  Does the patient have their discharge instructions? : Yes  Does the patient have questions regarding their discharge instructions? : No  Were you started on any new medications or were there changes to any of your previous medications? : Yes  Does the patient have all of their medications?: Yes  Do you have questions regarding any of your medications? : No  Do you have all of your needed medical supplies or equipment (DME)?  (i.e. oxygen tank, CPAP, cane, etc.): Yes  Discharge follow-up appointment scheduled  within 14 calendar days? : Yes  Discharge Follow Up Appointment Date: 04/05/24 (Patient has a follow up with his Surgeon on 4/2/24)  Discharge Follow Up Appointment Scheduled with?: Primary Care Provider         Post-op (Clinicians Only)  Did the patient have surgery or a procedure: Yes  Incision: closed;healing  Drainage: No  Bleeding: none  Fever: No  Chills: No  Redness: No  Warmth: No  Swelling: No  Incision site pain: No  Closure: suture  Eating & Drinking: eating and drinking without complaints/concerns  PO Intake: regular diet  Bowel Function: normal  Urinary Status: voiding without complaint/concerns    Care Management       Care Mgmt General Assessment      CCC RN spoke with patient's daughter Kitty who is currently staying with him. Kitty said she her father was having issues with leg spasm over the weekend, but they have stopped. She said he will need more oxycodone for pain management. She was encouraged to contact patient's surgeon's office for this need. Kitty said patient is taking all of his medications as directed. She said she and her brothers are taking turns staying with him to ensure his needs are being met. Patient has a follow up with his Surgeon on 4/2/24 and with his PCP on 4/5/24. He will being outpatient physical therapy with [facility] on 3/27/24. No other needs or concerns. Kitty said she was grateful for the follow up call today.                    PLAN                                                      Outpatient Plan:  Home with family support, outpatient PT    Future Appointments   Date Time Provider Department Center   4/5/2024  1:30 PM Howard Luu PA-C [facility]         For any urgent concerns, please contact our 24 hour nurse triage line: [phone] ([phone])         David J. Myhre, RN

## 2024-04-02 ENCOUNTER — TRANSFERRED RECORDS (OUTPATIENT)
Dept: HEALTH INFORMATION MANAGEMENT | Facility: CLINIC | Age: 89
End: 2024-04-02
Payer: COMMERCIAL

## 2024-04-15 ENCOUNTER — TELEPHONE (OUTPATIENT)
Dept: FAMILY MEDICINE | Facility: CLINIC | Age: 89
End: 2024-04-15
Payer: COMMERCIAL

## 2024-04-15 NOTE — TELEPHONE ENCOUNTER
"Spoke with patient. Taking only Tylenol for pain since surgery. No oxycodone. He states that he's having trouble falling asleep. He doesn't feel it's related to pain. He can't \"shut down\" to fall asleep. He was given a muscle relaxer however that isn't helpful. virtual Appt scheduled.   "

## 2024-04-15 NOTE — TELEPHONE ENCOUNTER
New Medication Request    Contacts         Type Contact Phone/Fax    04/15/2024 09:57 AM CDT Phone (Incoming) Karen Santiago (Self) [phone] (H)            What medication are you requesting?: sleeping tablet, trazodone 50 mg tablet    Reason for medication request: unable to sleep, due to just having a knee replacement 3 weeks ago, unable to come into clinic because he's not driving and would like something prescribed over the phone    Have you taken this medication before?: No    Controlled Substance Agreement on file:   CSA -- Patient Level:    CSA: None found at the patient level.         Patient offered an appointment? No, had one but it was cancelled due to provider being out    Preferred Pharmacy:   [id number] - [place], [place] - [address]  [address] [place] [address]  Phone: [phone] Fax: [phone]      Okay to leave a detailed message?: Yes at Home number on file [phone] (home)

## 2024-04-16 ENCOUNTER — VIRTUAL VISIT (OUTPATIENT)
Dept: FAMILY MEDICINE | Facility: CLINIC | Age: 89
End: 2024-04-16
Payer: COMMERCIAL

## 2024-04-16 DIAGNOSIS — D62 POSTOPERATIVE ANEMIA DUE TO ACUTE BLOOD LOSS: Primary | ICD-10-CM

## 2024-04-16 DIAGNOSIS — F51.01 PRIMARY INSOMNIA: ICD-10-CM

## 2024-04-16 PROCEDURE — 99442 PR PHYSICIAN TELEPHONE EVALUATION 11-20 MIN: CPT | Mod: 93 | Performed by: PHYSICIAN ASSISTANT

## 2024-04-16 RX ORDER — TRAZODONE HYDROCHLORIDE 50 MG/1
50 TABLET, FILM COATED ORAL AT BEDTIME
Qty: 90 TABLET | Refills: 3 | Status: SHIPPED | OUTPATIENT
Start: 2024-04-16 | End: 2024-05-31

## 2024-04-16 RX ORDER — CYCLOBENZAPRINE HCL 10 MG
10 TABLET ORAL
COMMUNITY
Start: 2024-04-11 | End: 2024-07-03

## 2024-04-16 RX ORDER — METHOCARBAMOL 500 MG/1
500 TABLET, FILM COATED ORAL PRN
COMMUNITY
Start: 2024-03-25 | End: 2024-07-03

## 2024-04-16 ASSESSMENT — ENCOUNTER SYMPTOMS
SLEEP DISTURBANCE: 1
FATIGUE: 0
CONSTIPATION: 0
DYSURIA: 0
FREQUENCY: 0
CHILLS: 0
VOMITING: 0
FEVER: 0
HEADACHES: 0
DIZZINESS: 0
BLOOD IN STOOL: 0
ABDOMINAL PAIN: 0
WHEEZING: 0
SHORTNESS OF BREATH: 0
LIGHT-HEADEDNESS: 0
HEMATURIA: 0
DIARRHEA: 0
PALPITATIONS: 0
COUGH: 0
NAUSEA: 0

## 2024-04-16 NOTE — PROGRESS NOTES
Karen is a 89 year old who is being evaluated via a billable telephone visit.    What phone number would you like to be contacted at? [phone]   How would you like to obtain your AVS? Mail a copy  Originating Location (pt. Location): Home    Distant Location (provider location):  On-site    Assessment & Plan     Postoperative anemia due to acute blood loss  Osteoarthritis of right knee  Last month underwent a total right knee replacement 3/24  Postoperatively hemoglobin did drop from 12.6-9.8.  He has not noticed any blood in his urine or stool.  No abdominal pain.  He is not having any difficulty with weakness or fatigue.  Will have him stop in the clinic for recheck CBC this week.  Since he has been discharged from the hospital he is doing well.  He continues with physical therapy a few times a week.  Does have a follow-up with orthopedics coming up for postop visit.  - CBC with platelets; Future    Primary insomnia  He has been having difficulty with falling asleep for the last year or so.  Since he had his knee replacement this has gotten worse.  He is tried over-the-counter melatonin which has not been helpful.  Would like to try medication to help with sleep.  Will trial trazodone 50 mg at bedtime.  Side effects of the medication discussed.  - traZODone (DESYREL) 50 MG tablet; Take 1 tablet (50 mg) by mouth at bedtime                Subjective   Karen is a 89 year old, presenting for the following health issues:  Sleep Problem (Pt reports he has a hard time falling asleep but once asleep can usually stay asleep. Does not get up during the middle of the night to use the bathroom. Daughter currently uses Trazadone. Has tried OTC but has not had much success.)        4/16/2024     1:29 PM   Additional Questions   Roomed by Zuleyka Sheehan   Accompanied by none     Rolando is a pleasant 89-year-old male who presents via video visit to discuss insomnia.  He states that has been having issues with insomnia for the  last year or so.  He states that he is tried over-the-counter medications including melatonin which has not been helpful.  About a month ago underwent a total right knee replacement and since then he is even having worsening issues sleeping.  He states he is having difficulty falling asleep but once he falls asleep he can stay asleep.    Reviewing his chart he did have some postoperative bleeding.  Hemoglobin preop was 12 postop was 9.  He states he has not noticed any blood in his urine or stool.  No abdominal pain.         Review of Systems   Constitutional:  Negative for chills, fatigue and fever.   Respiratory:  Negative for cough, shortness of breath and wheezing.    Cardiovascular:  Negative for chest pain and palpitations.   Gastrointestinal:  Negative for abdominal pain, blood in stool, constipation, diarrhea, nausea and vomiting.   Genitourinary:  Negative for dysuria, frequency, hematuria and urgency.   Neurological:  Negative for dizziness, light-headedness and headaches.   Psychiatric/Behavioral:  Positive for sleep disturbance.              Objective           Vitals:  No vitals were obtained today due to virtual visit.    Physical Exam   General: Alert and no distress //Respiratory: No audible wheeze, cough, or shortness of breath // Psychiatric:  Appropriate affect, tone, and pace of words            Phone call duration: 15 minutes  Signed Electronically by: Howard Luu PA-C

## 2024-04-17 ENCOUNTER — LAB (OUTPATIENT)
Dept: LAB | Facility: CLINIC | Age: 89
End: 2024-04-17
Payer: COMMERCIAL

## 2024-04-17 DIAGNOSIS — D62 POSTOPERATIVE ANEMIA DUE TO ACUTE BLOOD LOSS: ICD-10-CM

## 2024-04-17 LAB
ERYTHROCYTE [DISTWIDTH] IN BLOOD BY AUTOMATED COUNT: 14.8 % (ref 10–15)
HCT VFR BLD AUTO: 35.3 % (ref 40–53)
HGB BLD-MCNC: 11.3 G/DL (ref 13.3–17.7)
MCH RBC QN AUTO: 31 PG (ref 26.5–33)
MCHC RBC AUTO-ENTMCNC: 32 G/DL (ref 31.5–36.5)
MCV RBC AUTO: 97 FL (ref 78–100)
PLATELET # BLD AUTO: 230 10E3/UL (ref 150–450)
RBC # BLD AUTO: 3.64 10E6/UL (ref 4.4–5.9)
WBC # BLD AUTO: 4.8 10E3/UL (ref 4–11)

## 2024-04-17 PROCEDURE — 36415 COLL VENOUS BLD VENIPUNCTURE: CPT

## 2024-04-17 PROCEDURE — 85027 COMPLETE CBC AUTOMATED: CPT

## 2024-04-30 ENCOUNTER — TRANSFERRED RECORDS (OUTPATIENT)
Dept: HEALTH INFORMATION MANAGEMENT | Facility: CLINIC | Age: 89
End: 2024-04-30
Payer: COMMERCIAL

## 2024-05-06 DIAGNOSIS — J44.1 COPD EXACERBATION (H): ICD-10-CM

## 2024-05-06 RX ORDER — ALBUTEROL SULFATE 90 UG/1
AEROSOL, METERED RESPIRATORY (INHALATION)
Qty: 18 G | Refills: 1 | Status: SHIPPED | OUTPATIENT
Start: 2024-05-06

## 2024-05-14 DIAGNOSIS — J43.9 PULMONARY EMPHYSEMA, UNSPECIFIED EMPHYSEMA TYPE (H): ICD-10-CM

## 2024-05-14 RX ORDER — MOMETASONE FUROATE AND FORMOTEROL FUMARATE DIHYDRATE 100; 5 UG/1; UG/1
AEROSOL RESPIRATORY (INHALATION)
Qty: 13 G | Refills: 0 | Status: SHIPPED | OUTPATIENT
Start: 2024-05-14 | End: 2024-06-27

## 2024-05-28 ENCOUNTER — APPOINTMENT (OUTPATIENT)
Dept: CT IMAGING | Facility: CLINIC | Age: 89
End: 2024-05-28
Attending: EMERGENCY MEDICINE
Payer: COMMERCIAL

## 2024-05-28 ENCOUNTER — HOSPITAL ENCOUNTER (EMERGENCY)
Facility: CLINIC | Age: 89
Discharge: HOME | End: 2024-05-28
Attending: EMERGENCY MEDICINE | Admitting: EMERGENCY MEDICINE
Payer: COMMERCIAL

## 2024-05-28 VITALS
WEIGHT: 184.7 LBS | RESPIRATION RATE: 23 BRPM | DIASTOLIC BLOOD PRESSURE: 70 MMHG | SYSTOLIC BLOOD PRESSURE: 162 MMHG | TEMPERATURE: 98.7 F | BODY MASS INDEX: 25.86 KG/M2 | HEART RATE: 77 BPM | HEIGHT: 71 IN | OXYGEN SATURATION: 95 %

## 2024-05-28 DIAGNOSIS — J44.1 COPD EXACERBATION (H): ICD-10-CM

## 2024-05-28 LAB
ALBUMIN SERPL BCG-MCNC: 3.8 G/DL (ref 3.5–5.2)
ALP SERPL-CCNC: 58 U/L (ref 40–150)
ALT SERPL W P-5'-P-CCNC: 7 U/L (ref 0–70)
ANION GAP SERPL CALCULATED.3IONS-SCNC: 9 MMOL/L (ref 7–15)
APTT PPP: 28 SECONDS (ref 22–38)
AST SERPL W P-5'-P-CCNC: 16 U/L (ref 0–45)
ATRIAL RATE - MUSE: 73 BPM
BASOPHILS # BLD AUTO: 0 10E3/UL (ref 0–0.2)
BASOPHILS NFR BLD AUTO: 1 %
BILIRUB SERPL-MCNC: 0.3 MG/DL
BUN SERPL-MCNC: 15.5 MG/DL (ref 8–23)
CALCIUM SERPL-MCNC: 9 MG/DL (ref 8.8–10.2)
CHLORIDE SERPL-SCNC: 110 MMOL/L (ref 98–107)
CREAT SERPL-MCNC: 0.71 MG/DL (ref 0.67–1.17)
D DIMER PPP FEU-MCNC: 1.81 UG/ML FEU (ref 0–0.5)
DEPRECATED HCO3 PLAS-SCNC: 27 MMOL/L (ref 22–29)
DIASTOLIC BLOOD PRESSURE - MUSE: 81 MMHG
EGFRCR SERPLBLD CKD-EPI 2021: 88 ML/MIN/1.73M2
EOSINOPHIL # BLD AUTO: 0.1 10E3/UL (ref 0–0.7)
EOSINOPHIL NFR BLD AUTO: 4 %
ERYTHROCYTE [DISTWIDTH] IN BLOOD BY AUTOMATED COUNT: 15.1 % (ref 10–15)
GLUCOSE SERPL-MCNC: 99 MG/DL (ref 70–99)
HCT VFR BLD AUTO: 35.6 % (ref 40–53)
HGB BLD-MCNC: 11.3 G/DL (ref 13.3–17.7)
HOLD SPECIMEN: NORMAL
IMM GRANULOCYTES # BLD: 0 10E3/UL
IMM GRANULOCYTES NFR BLD: 0 %
INR PPP: 0.92 (ref 0.85–1.15)
INTERPRETATION ECG - MUSE: NORMAL
LYMPHOCYTES # BLD AUTO: 1 10E3/UL (ref 0.8–5.3)
LYMPHOCYTES NFR BLD AUTO: 28 %
MAGNESIUM SERPL-MCNC: 2 MG/DL (ref 1.7–2.3)
MCH RBC QN AUTO: 29.6 PG (ref 26.5–33)
MCHC RBC AUTO-ENTMCNC: 31.7 G/DL (ref 31.5–36.5)
MCV RBC AUTO: 93 FL (ref 78–100)
MONOCYTES # BLD AUTO: 0.3 10E3/UL (ref 0–1.3)
MONOCYTES NFR BLD AUTO: 9 %
NEUTROPHILS # BLD AUTO: 2 10E3/UL (ref 1.6–8.3)
NEUTROPHILS NFR BLD AUTO: 58 %
NRBC # BLD AUTO: 0 10E3/UL
NRBC BLD AUTO-RTO: 0 /100
NT-PROBNP SERPL-MCNC: 508 PG/ML (ref 0–1800)
P AXIS - MUSE: 73 DEGREES
PLATELET # BLD AUTO: 162 10E3/UL (ref 150–450)
POTASSIUM SERPL-SCNC: 3.4 MMOL/L (ref 3.4–5.3)
PR INTERVAL - MUSE: 188 MS
PROT SERPL-MCNC: 5.9 G/DL (ref 6.4–8.3)
QRS DURATION - MUSE: 94 MS
QT - MUSE: 418 MS
QTC - MUSE: 460 MS
R AXIS - MUSE: 86 DEGREES
RBC # BLD AUTO: 3.82 10E6/UL (ref 4.4–5.9)
SODIUM SERPL-SCNC: 146 MMOL/L (ref 135–145)
SYSTOLIC BLOOD PRESSURE - MUSE: 193 MMHG
T AXIS - MUSE: 74 DEGREES
TROPONIN T SERPL HS-MCNC: 21 NG/L
VENTRICULAR RATE- MUSE: 73 BPM
WBC # BLD AUTO: 3.5 10E3/UL (ref 4–11)

## 2024-05-28 PROCEDURE — 250N000009 HC RX 250: Performed by: EMERGENCY MEDICINE

## 2024-05-28 PROCEDURE — 71275 CT ANGIOGRAPHY CHEST: CPT

## 2024-05-28 PROCEDURE — 99285 EMERGENCY DEPT VISIT HI MDM: CPT | Mod: 25

## 2024-05-28 PROCEDURE — 80053 COMPREHEN METABOLIC PANEL: CPT | Performed by: EMERGENCY MEDICINE

## 2024-05-28 PROCEDURE — 85025 COMPLETE CBC W/AUTO DIFF WBC: CPT | Performed by: EMERGENCY MEDICINE

## 2024-05-28 PROCEDURE — 36415 COLL VENOUS BLD VENIPUNCTURE: CPT | Performed by: EMERGENCY MEDICINE

## 2024-05-28 PROCEDURE — 96374 THER/PROPH/DIAG INJ IV PUSH: CPT | Mod: 59

## 2024-05-28 PROCEDURE — 85379 FIBRIN DEGRADATION QUANT: CPT | Performed by: EMERGENCY MEDICINE

## 2024-05-28 PROCEDURE — 85730 THROMBOPLASTIN TIME PARTIAL: CPT | Performed by: EMERGENCY MEDICINE

## 2024-05-28 PROCEDURE — 250N000011 HC RX IP 250 OP 636: Performed by: EMERGENCY MEDICINE

## 2024-05-28 PROCEDURE — 83880 ASSAY OF NATRIURETIC PEPTIDE: CPT | Performed by: EMERGENCY MEDICINE

## 2024-05-28 PROCEDURE — 83735 ASSAY OF MAGNESIUM: CPT | Performed by: EMERGENCY MEDICINE

## 2024-05-28 PROCEDURE — 93005 ELECTROCARDIOGRAM TRACING: CPT | Performed by: EMERGENCY MEDICINE

## 2024-05-28 PROCEDURE — 94640 AIRWAY INHALATION TREATMENT: CPT

## 2024-05-28 PROCEDURE — 999N000157 HC STATISTIC RCP TIME EA 10 MIN

## 2024-05-28 PROCEDURE — 84484 ASSAY OF TROPONIN QUANT: CPT | Performed by: EMERGENCY MEDICINE

## 2024-05-28 PROCEDURE — 85610 PROTHROMBIN TIME: CPT | Performed by: EMERGENCY MEDICINE

## 2024-05-28 RX ORDER — SOFT LENS DISINFECTANT
1 SOLUTION, NON-ORAL MISCELLANEOUS PRN
Qty: 1 EACH | Refills: 0 | Status: SHIPPED | OUTPATIENT
Start: 2024-05-28 | End: 2024-07-29

## 2024-05-28 RX ORDER — IOPAMIDOL 755 MG/ML
75 INJECTION, SOLUTION INTRAVASCULAR ONCE
Status: COMPLETED | OUTPATIENT
Start: 2024-05-28 | End: 2024-05-28

## 2024-05-28 RX ORDER — SOFT LENS DISINFECTANT
1 SOLUTION, NON-ORAL MISCELLANEOUS PRN
Qty: 1 EACH | Refills: 0 | Status: SHIPPED | OUTPATIENT
Start: 2024-05-28 | End: 2024-05-28

## 2024-05-28 RX ORDER — PREDNISONE 20 MG/1
40 TABLET ORAL DAILY
Qty: 10 TABLET | Refills: 0 | Status: SHIPPED | OUTPATIENT
Start: 2024-05-28 | End: 2024-06-02

## 2024-05-28 RX ORDER — ALBUTEROL SULFATE 0.83 MG/ML
5 SOLUTION RESPIRATORY (INHALATION) EVERY 4 HOURS PRN
Qty: 90 ML | Refills: 0 | Status: SHIPPED | OUTPATIENT
Start: 2024-05-28 | End: 2024-07-03

## 2024-05-28 RX ORDER — IPRATROPIUM BROMIDE AND ALBUTEROL SULFATE 2.5; .5 MG/3ML; MG/3ML
3 SOLUTION RESPIRATORY (INHALATION) ONCE
Status: COMPLETED | OUTPATIENT
Start: 2024-05-28 | End: 2024-05-28

## 2024-05-28 RX ORDER — METHYLPREDNISOLONE SODIUM SUCCINATE 125 MG/2ML
125 INJECTION, POWDER, LYOPHILIZED, FOR SOLUTION INTRAMUSCULAR; INTRAVENOUS ONCE
Status: COMPLETED | OUTPATIENT
Start: 2024-05-28 | End: 2024-05-28

## 2024-05-28 RX ORDER — AZITHROMYCIN 250 MG/1
TABLET, FILM COATED ORAL
Qty: 6 TABLET | Refills: 0 | Status: SHIPPED | OUTPATIENT
Start: 2024-05-28 | End: 2024-06-02

## 2024-05-28 RX ADMIN — METHYLPREDNISOLONE SODIUM SUCCINATE 125 MG: 125 INJECTION, POWDER, FOR SOLUTION INTRAMUSCULAR; INTRAVENOUS at 07:17

## 2024-05-28 RX ADMIN — IPRATROPIUM BROMIDE AND ALBUTEROL SULFATE 3 ML: .5; 3 SOLUTION RESPIRATORY (INHALATION) at 06:55

## 2024-05-28 RX ADMIN — IOPAMIDOL 75 ML: 755 INJECTION, SOLUTION INTRAVENOUS at 07:36

## 2024-05-28 ASSESSMENT — ACTIVITIES OF DAILY LIVING (ADL)
ADLS_ACUITY_SCORE: 38

## 2024-05-28 ASSESSMENT — ENCOUNTER SYMPTOMS: SHORTNESS OF BREATH: 1

## 2024-05-28 NOTE — PROGRESS NOTES
Pt came into ED via EMS with SOB and was unable to breathe. Pt on RA, SPO2 96%, HR 69, RR 22, BS clear and diminished. Pt quit smoking in 1980. Pt has Albuterol mdi and Dulera at home. No oxygen and no CPAP at home. Pt is not currently SOB at rest but gets very SOB with activity. Pt given Duo neb x  1. After neb treatment BS no change and patient feels a lot more open in airway.  Livier Peguero, RT

## 2024-05-28 NOTE — DISCHARGE INSTRUCTIONS
Your symptoms appear consistent with a COPD exacerbation.      Continue to use the albuterol nebulizer breathing treatments and/or your inhaler every 4 hours as needed.  Take the prescribed steroid (prednisone) and the antibiotic daily for the next 5 days to treat your COPD exacerbation.     Please follow-up with your primary care provider for reevaluation or return back to ED sooner for any worsening respiratory difficulty or any other new or concerning symptoms.

## 2024-05-28 NOTE — ED PROVIDER NOTES
EMERGENCY DEPARTMENT ENCOUNTER      NAME: Karen Santiago  AGE: 89 year old male  YOB: 1934  MRN: [id number]  EVALUATION DATE & TIME: 2024  6:27 AM    PCP: Howard Luu    ED PROVIDER: Radha Sherman DO      Chief Complaint   Patient presents with    Shortness of Breath         FINAL IMPRESSION:  1. COPD exacerbation (H)          ED COURSE & MEDICAL DECISION MAKIN-year-old male with history of COPD presented to the ED for evaluation of shortness of breath that began yesterday.  Patient denied any associated chest pain or chest pressure, cough, or fevers.  Upon arrival to the ED the patient was slightly hypertensive.  He was otherwise hemodynamic stable with O2 sats in the upper 90s on room air.  The patient did not appear to be in any obvious respiratory distress and he was not ill-appearing.   The patient's physical exam was unremarkable other than noting swelling in the right leg when compared to the left.      Following was initial evaluation the patient was given a DuoNeb breathing treatment and methylprednisolone.    The patient's EKG revealed normal sinus rhythm without any concerning ST or T wave changes.    The patient's CBC, BMP, BNP, magnesium, and troponin were all reassuring.  The D-dimer was elevated 1.8.      CT scan of the chest does not show evidence of a PE.  Patient is noted to have emphysematous changes.  There are also resolving inflammatory changes as well as some acute inflammatory changes noted in the right apex.  Patient is also noted to have findings consistent with pulmonary hypertension.    The patient was reevaluated and informed of the chest CT scan results.  The patient was informed that his symptoms are likely related to a mild COPD exacerbation.  Patient stated that he felt comfortable returning home following his ED workup.  O2 sats were in the mid 90s on room air at the time of reevaluation.  The patient was sent home with a nebulizer machine with  instructions to use albuterol every 4 hours as needed.  The patient was also sent home with a 5-day course of prednisone as well as azithromycin to treat his COPD exacerbation.  The patient was instructed to follow-up with his primary care provider for reevaluation within the next week or to return back to ED sooner for any worsening respiratory difficulty or any other new or concerning symptoms.      Pertinent Labs & Imaging studies reviewed. (See chart for details)  6:34 AM I met with the patient to gather history and to perform my initial exam. We discussed plans for the ED course, including diagnostic testing and treatment.       At the conclusion of the encounter I discussed the results of all of the tests and the disposition. The questions were answered. The patient or family acknowledged understanding and was agreeable with the care plan.     Medical Decision Making    History:  Supplemental history from: Documented in chart  External Record(s) reviewed: Documented in chart    Work Up:  Chart documentation includes differential considered and any EKGs or imaging independently interpreted by provider, where specified.  In additional to work up documented, I considered the following work up: Documented in chart, if applicable.    External consultation:  Discussion of management with another provider: Documented in chart, if applicable    Complicating factors:  Care impacted by chronic illness: Hypertension and Other: COPD  Care affected by social determinants of health: N/A    Disposition considerations: Discharge. I prescribed additional prescription strength medication(s) as charted. See documentation for any additional details.        PPE worn: n95 mask, goggles    MEDICATIONS GIVEN IN THE EMERGENCY:  Medications   ipratropium - albuterol 0.5 mg/2.5 mg/3 mL (DUONEB) neb solution 3 mL (3 mLs Nebulization $Given 5/28/24 6636)   methylPREDNISolone sodium succinate (solu-MEDROL) injection 125 mg (125 mg  Intravenous $Given 5/28/24 0717)   iopamidol (ISOVUE-370) solution 75 mL (75 mLs Intravenous $Given 5/28/24 0736)       NEW PRESCRIPTIONS STARTED AT TODAY'S ER VISIT  New Prescriptions    ALBUTEROL (PROVENTIL) (2.5 MG/3ML) 0.083% NEB SOLUTION    Take 2 vials (5 mg) by nebulization every 4 hours as needed for shortness of breath or wheezing    AZITHROMYCIN (ZITHROMAX) 250 MG TABLET    Take 2 tablets (500 mg) by mouth daily for 1 day, THEN 1 tablet (250 mg) daily for 4 days.    PREDNISONE (DELTASONE) 20 MG TABLET    Take 2 tablets (40 mg) by mouth daily for 5 days Take two tablets (= 40mg) each day for 5 (five) days    RESPIRATORY THERAPY SUPPLIES (NEBULIZER CUP/TUBING) DELMAR    Take 1 Device by mouth as needed (wheezing/cough)          =================================================================    HPI    Patient information was obtained from: Patient    Use of : N/A       Karen Santiago is a 89 year old male with a pertinent history of COPD and hypertension who presents to this ED by EMS for evaluation of shortness of breath.     The patient reports yesterday while reading, it was hard for him to breathe, he took his inhaler and it helped. Right before bed, he began to feel weak. When he woke up this morning, he felt like he was unable to breathe.  His symptoms do not feel like when he had COPD before and he explains that it just feels like he can't breathe.     Additionally, the patient had a knee replacement on 03/20/24 and the healing process as been good. Patient does take ibuprofen for th swelling.     Denies chest pain, chest pressure, increased leg swelling, and medication changes.       REVIEW OF SYSTEMS   Review of Systems   Respiratory:  Positive for shortness of breath.    Cardiovascular:  Negative for chest pain.   All other systems reviewed and are negative.       PAST MEDICAL HISTORY:  Past Medical History:   Diagnosis Date    Cancer (H)     bladder    COPD (chronic obstructive  pulmonary disease) (H)     Hypertension     Rheumatoid arthritis (H)        PAST SURGICAL HISTORY:  Past Surgical History:   Procedure Laterality Date    ARTHROPLASTY KNEE Right 3/20/2024    Procedure: RIGHT TOTAL KNEE ARTHROPLASTY;  Surgeon: Kishor Mckeon MD;  Location: [facility] Main OR    HC REMOVAL TESTIS,SIMPLE      Description: Orchiectomy Left;  Recorded: 04/26/2010;  Comments: for benign interstitial tumor    [facility] CYSTECTOMY,ILEAL CONDUIT/SIGMOID BLADDER      Description: Complete Bladder Cystectomy With Ureteroileal Conduit;  Proc Date: 01/01/2002;  Comments: cystoprostatectomy for squamous cell carcinoma per [facility] REMV PROSTATE,RETROPUBIC,SUBTOTAL      Description: Prostatectomy, Retropubic;  Recorded: 05/19/2009;  Comments: 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 per [facility] TOTAL HIP ARTHROPLASTY      Description: Total Hip Replacement;  Proc Date: 10/17/2012;    [email or web address] REPAIR UMBILICAL IRLANDA,<4Y/O,REDUC      Description: Umbilical Hernia Repair;  Recorded: 04/26/2010;           CURRENT MEDICATIONS:    albuterol (PROVENTIL) (2.5 MG/3ML) 0.083% neb solution  azithromycin (ZITHROMAX) 250 MG tablet  predniSONE (DELTASONE) 20 MG tablet  Respiratory Therapy Supplies (NEBULIZER CUP/TUBING) DELMAR  acetaminophen (TYLENOL) 325 MG tablet  albuterol (PROAIR HFA/PROVENTIL HFA/VENTOLIN HFA) 108 (90 Base) MCG/ACT inhaler  amLODIPine (NORVASC) 10 MG tablet  aspirin 81 MG EC tablet  cefadroxil (DURICEF) 500 MG capsule  cyclobenzaprine (FLEXERIL) 10 MG tablet  DULERA 100-5 MCG/ACT inhaler  hydrOXYzine HCl (ATARAX) 10 MG tablet  losartan (COZAAR) 100 MG tablet  methocarbamol (ROBAXIN) 500 MG tablet  Multiple Vitamins-Iron (MULTI-VITAMIN  /IRON) TABS  oxyCODONE (ROXICODONE) 5 MG tablet  senna-docusate (SENOKOT-S/PERICOLACE) 8.6-50 MG tablet  traZODone (DESYREL) 50 MG tablet  vitamin B complex with vitamin C (STRESS TAB) tablet  vitamin B-12 (CYANOCOBALAMIN) 100 MCG tablet  vitamin D3 (CHOLECALCIFEROL)  50 mcg (2000 units) tablet        ALLERGIES:  No Known Allergies    FAMILY HISTORY:  History reviewed. No pertinent family history.    SOCIAL HISTORY:   Social History     Socioeconomic History    Marital status:      Spouse name: None    Number of children: None    Years of education: None    Highest education level: None   Tobacco Use    Smoking status: Former     Current packs/day: 0.00     Types: Cigarettes     Quit date: 1980     Years since quittin.4     Passive exposure: Past    Smokeless tobacco: Never   Vaping Use    Vaping status: Never Used   Substance and Sexual Activity    Alcohol use: No     Comment: Alcoholic Drinks/day: 2-3 beer per month    Drug use: No     Social Determinants of Health     Financial Resource Strain: Low Risk  (2023)    Financial Resource Strain     Within the past 12 months, have you or your family members you live with been unable to get utilities (heat, electricity) when it was really needed?: No   Food Insecurity: Low Risk  (2023)    Food Insecurity     Within the past 12 months, did you worry that your food would run out before you got money to buy more?: No     Within the past 12 months, did the food you bought just not last and you didn t have money to get more?: No   Transportation Needs: Low Risk  (2023)    Transportation Needs     Within the past 12 months, has lack of transportation kept you from medical appointments, getting your medicines, non-medical meetings or appointments, work, or from getting things that you need?: No   Physical Activity: Insufficiently Active (2022)    Received from [facility]    Exercise Vital Sign     Days of Exercise per Week: 3 days     Minutes of Exercise per Session: 20 min   Stress: No Stress Concern Present (2022)    Received from [facility]    [employer] [place] [employer] - Occupational Stress Questionnaire     Feeling of Stress : Only a little   Social Connections: Moderately  "Isolated (7/19/2022)    Received from [facility]    Social Connection and Isolation Panel [NHANES]     Frequency of Communication with Friends and Family: More than three times a week     Frequency of Social Gatherings with Friends and Family: Patient refused     Attends [employer] Services: Never     Active Member of Clubs or Organizations: No     Attends Club or Organization Meetings: Never     Marital Status:    Interpersonal Safety: Low Risk  (9/27/2023)    Interpersonal Safety     Do you feel physically and emotionally safe where you currently live?: Yes     Within the past 12 months, have you been hit, slapped, kicked or otherwise physically hurt by someone?: No     Within the past 12 months, have you been humiliated or emotionally abused in other ways by your partner or ex-partner?: No   Housing Stability: Low Risk  (9/27/2023)    Housing Stability     Do you have housing? : Yes     Are you worried about losing your housing?: No       VITALS:  BP (!) 168/79   Pulse 79   Temp 98.7  F (37.1  C) (Oral)   Resp (!) 32   Ht 1.803 m (5' 11\")   Wt 83.8 kg (184 lb 11.2 oz)   SpO2 94%   BMI 25.76 kg/m      PHYSICAL EXAM    General presentation: Alert, Vital signs reviewed. NAD  HENT: ENT inspection is normal. Oropharynx is moist and clear.   Eye: Pupils are equal and reactive to light. EOMI  Neck: The neck is supple, with full ROM, with no evidence of meningismus.  Pulmonary: Currently in no acute respiratory distress. Normal, non labored respirations. Slight diminished breathe sounds.   Circulatory: Regular rate and rhythm. Peripheral pulses are strong and equal. No murmurs, rubs, or gallops.   Abdominal: The abdomen is soft. Nontender. No rigidity, guarding, or rebound. Bowel sounds normal.   Neurologic: Alert, oriented to person, place, and time. No motor deficit. No sensory deficit. Cranial nerves II through XII are intact.  Musculoskeletal: No extremity tenderness. Full range of motion in all " extremities. No extremity edema. Right leg is more swollen compared to the left .   Skin: Skin color is normal. No rash. Warm. Dry to touch.      LAB:  All pertinent labs reviewed and interpreted.  Results for orders placed or performed during the hospital encounter of 05/28/24   CT Chest Pulmonary Embolism w Contrast    Impression    IMPRESSION:    1.  Unchanged enlargement of the main pulmonary artery which is associated with pulmonary hypertension. No acute pulmonary embolism.  2.  Symmetric small bilateral pleural effusions and related atelectasis.  3.  Evolution of airspace inflammation in the lingula and adjacent basilar left lower lobe compared to 2/14/2024 consistent with evolving radiation pneumonitis into fibrosis  4.  Emphysema.  5.  New small focal mixed attenuation opacity in the right apex consistent with acute parenchymal inflammation. Circumscribed right lower lobe nodules are stable and there is minimal change in a polygonal right middle lobe nodule favored for a reactive   intrapulmonary lymph node. Continued attention on follow-up.  6.  Bilateral pleural plaque with calcification, a marker of asbestos exposure.  7.  Cholelithiasis.   Result Value Ref Range    INR 0.92 0.85 - 1.15   Partial thromboplastin time   Result Value Ref Range    aPTT 28 22 - 38 Seconds   Comprehensive metabolic panel   Result Value Ref Range    Sodium 146 (H) 135 - 145 mmol/L    Potassium 3.4 3.4 - 5.3 mmol/L    Carbon Dioxide (CO2) 27 22 - 29 mmol/L    Anion Gap 9 7 - 15 mmol/L    Urea Nitrogen 15.5 8.0 - 23.0 mg/dL    Creatinine 0.71 0.67 - 1.17 mg/dL    GFR Estimate 88 >60 mL/min/1.73m2    Calcium 9.0 8.8 - 10.2 mg/dL    Chloride 110 (H) 98 - 107 mmol/L    Glucose 99 70 - 99 mg/dL    Alkaline Phosphatase 58 40 - 150 U/L    AST 16 0 - 45 U/L    ALT 7 0 - 70 U/L    Protein Total 5.9 (L) 6.4 - 8.3 g/dL    Albumin 3.8 3.5 - 5.2 g/dL    Bilirubin Total 0.3 <=1.2 mg/dL   Result Value Ref Range    Magnesium 2.0 1.7 - 2.3 mg/dL    Result Value Ref Range    Troponin T, High Sensitivity 21 <=22 ng/L   Nt probnp inpatient   Result Value Ref Range    N terminal Pro BNP Inpatient 508 0 - 1,800 pg/mL   D dimer quantitative   Result Value Ref Range    D-Dimer Quantitative 1.81 (H) 0.00 - 0.50 ug/mL FEU   CBC with platelets and differential   Result Value Ref Range    WBC Count 3.5 (L) 4.0 - 11.0 10e3/uL    RBC Count 3.82 (L) 4.40 - 5.90 10e6/uL    Hemoglobin 11.3 (L) 13.3 - 17.7 g/dL    Hematocrit 35.6 (L) 40.0 - 53.0 %    MCV 93 78 - 100 fL    MCH 29.6 26.5 - 33.0 pg    MCHC 31.7 31.5 - 36.5 g/dL    RDW 15.1 (H) 10.0 - 15.0 %    Platelet Count 162 150 - 450 10e3/uL    % Neutrophils 58 %    % Lymphocytes 28 %    % Monocytes 9 %    % Eosinophils 4 %    % Basophils 1 %    % Immature Granulocytes 0 %    NRBCs per 100 WBC 0 <1 /100    Absolute Neutrophils 2.0 1.6 - 8.3 10e3/uL    Absolute Lymphocytes 1.0 0.8 - 5.3 10e3/uL    Absolute Monocytes 0.3 0.0 - 1.3 10e3/uL    Absolute Eosinophils 0.1 0.0 - 0.7 10e3/uL    Absolute Basophils 0.0 0.0 - 0.2 10e3/uL    Absolute Immature Granulocytes 0.0 <=0.4 10e3/uL    Absolute NRBCs 0.0 10e3/uL   Extra Red Top Tube   Result Value Ref Range    Hold Specimen [facility]    ECG 12-LEAD WITH MUSE (LHE)   Result Value Ref Range    Systolic Blood Pressure 193 mmHg    Diastolic Blood Pressure 81 mmHg    Ventricular Rate 73 BPM    Atrial Rate 73 BPM    OK Interval 188 ms    QRS Duration 94 ms     ms    QTc 460 ms    P Axis 73 degrees    R AXIS 86 degrees    T Axis 74 degrees    Interpretation ECG       Sinus rhythm  Normal ECG  When compared with ECG of 14-FEB-2024 10:43,  No significant change was found  Confirmed by SEE ED PROVIDER NOTE FOR, ECG INTERPRETATION ([id number]),  SUN OCHOA ([id number]) on 5/28/2024 6:37:55 AM         RADIOLOGY:  Reviewed all pertinent imaging. Please see official radiology report.  CT Chest Pulmonary Embolism w Contrast   Final Result   IMPRESSION:      1.  Unchanged enlargement of the main  pulmonary artery which is associated with pulmonary hypertension. No acute pulmonary embolism.   2.  Symmetric small bilateral pleural effusions and related atelectasis.   3.  Evolution of airspace inflammation in the lingula and adjacent basilar left lower lobe compared to 2/14/2024 consistent with evolving radiation pneumonitis into fibrosis   4.  Emphysema.   5.  New small focal mixed attenuation opacity in the right apex consistent with acute parenchymal inflammation. Circumscribed right lower lobe nodules are stable and there is minimal change in a polygonal right middle lobe nodule favored for a reactive    intrapulmonary lymph node. Continued attention on follow-up.   6.  Bilateral pleural plaque with calcification, a marker of asbestos exposure.   7.  Cholelithiasis.          EKG:    Normal sinus rhythm.  Rate of 73.  Normal QRS.  Normal QT.  No ST or T wave changes.  No significant change compared to the EKG on 2/14/2024.    I have independently reviewed and interpreted the EKG(s) documented above.        I, Olesya Lorenz , am serving as a scribe to document services personally performed by Radha Sherman DO based on my observation and the provider's statements to me. I, Radha Sherman, attest that Olesya Lorenz is acting in a scribe capacity, has observed my performance of the services and has documented them in accordance with my direction.    Radha Sherman DO  Emergency Medicine  [facility] EMERGENCY ROOM  [id number] [id number] [phone]       Radha Sherman DO  05/28/24 0910

## 2024-05-28 NOTE — ED TRIAGE NOTES
Pt presents via EMS with complaints of SOB and weakness that began yesterday. Pt states last night took albuterol inhaler when SOB with relief, but did not feel relief when doing so this AM around 0430. SOB worse when laying down. Hx COPD and HTN.

## 2024-05-31 ENCOUNTER — TELEPHONE (OUTPATIENT)
Dept: FAMILY MEDICINE | Facility: CLINIC | Age: 89
End: 2024-05-31
Payer: COMMERCIAL

## 2024-05-31 DIAGNOSIS — F51.01 PRIMARY INSOMNIA: ICD-10-CM

## 2024-05-31 DIAGNOSIS — J84.112 IPF (IDIOPATHIC PULMONARY FIBROSIS) (H): Primary | ICD-10-CM

## 2024-05-31 RX ORDER — TRAZODONE HYDROCHLORIDE 50 MG/1
TABLET, FILM COATED ORAL
Qty: 180 TABLET | Refills: 0 | Status: SHIPPED | OUTPATIENT
Start: 2024-05-31 | End: 2024-06-19

## 2024-05-31 NOTE — TELEPHONE ENCOUNTER
General Call    Contacts         Type Contact Phone/Fax    05/31/2024 10:05 AM CDT Phone (Incoming) Karen Santiago (Self) [phone] (H)          Reason for Call:  trazodone increase, CT needed or not    What are your questions or concerns:  pt states he is taking 2x every night of the trazodone as taking only 1 did not work. He now cannot get a refill until they adjust the RX with pharmacy.    He also states he had to go to ER and they did CT so does he still need the one he has scheduled for 6/10/24? Please advise    Date of last appointment with provider: 4/16/124    Okay to leave a detailed message?: Yes at Home number on file [phone] (home)

## 2024-06-01 DIAGNOSIS — I10 ESSENTIAL HYPERTENSION, BENIGN: ICD-10-CM

## 2024-06-03 RX ORDER — LOSARTAN POTASSIUM 100 MG/1
100 TABLET ORAL DAILY
Qty: 90 TABLET | Refills: 3 | Status: SHIPPED | OUTPATIENT
Start: 2024-06-03

## 2024-06-03 RX ORDER — AMLODIPINE BESYLATE 10 MG/1
10 TABLET ORAL DAILY
Qty: 90 TABLET | Refills: 3 | Status: SHIPPED | OUTPATIENT
Start: 2024-06-03

## 2024-06-03 NOTE — TELEPHONE ENCOUNTER
NO need for CT 6/10. Reviewed CT from ED and showed Evolution of airspace inflammation in the left lower lobe compared to 2/14/2024 consistent with evolving radiation pneumonitis into fibrosis. Due this change I would like him to see pulmonology. Referral has been placed.

## 2024-06-07 ENCOUNTER — TELEPHONE (OUTPATIENT)
Dept: PULMONOLOGY | Facility: CLINIC | Age: 89
End: 2024-06-07
Payer: COMMERCIAL

## 2024-06-07 NOTE — TELEPHONE ENCOUNTER
Left Voicemail (1st Attempt) for the patient to call back and schedule the following:    Appointment type: NPULM REFERRAL   Provider: NONE (any gen pulm provider)  Return date: NEXT AVAILABLE  Specialty phone number: [phone]  Additional appointment(s) needed: FPFT  Additonal Notes: Per Talisha Donnelly, after ILD review pt should start in gen pulm

## 2024-06-11 NOTE — TELEPHONE ENCOUNTER
Patient Contacted for the patient to call back and schedule the following:    Called to schedule new gen pulm referral, patient stated he would like to meet with his PCP before scheduling referral, gave call center phone number to schedule

## 2024-06-18 ENCOUNTER — TRANSFERRED RECORDS (OUTPATIENT)
Dept: HEALTH INFORMATION MANAGEMENT | Facility: CLINIC | Age: 89
End: 2024-06-18
Payer: COMMERCIAL

## 2024-06-19 ENCOUNTER — OFFICE VISIT (OUTPATIENT)
Dept: FAMILY MEDICINE | Facility: CLINIC | Age: 89
End: 2024-06-19
Payer: COMMERCIAL

## 2024-06-19 VITALS
WEIGHT: 165 LBS | OXYGEN SATURATION: 97 % | RESPIRATION RATE: 18 BRPM | DIASTOLIC BLOOD PRESSURE: 72 MMHG | HEIGHT: 71 IN | BODY MASS INDEX: 23.1 KG/M2 | HEART RATE: 84 BPM | TEMPERATURE: 98.2 F | SYSTOLIC BLOOD PRESSURE: 130 MMHG

## 2024-06-19 DIAGNOSIS — G47.00 INSOMNIA, UNSPECIFIED TYPE: ICD-10-CM

## 2024-06-19 DIAGNOSIS — J43.9 PULMONARY EMPHYSEMA, UNSPECIFIED EMPHYSEMA TYPE (H): Primary | ICD-10-CM

## 2024-06-19 DIAGNOSIS — F41.9 ANXIETY: ICD-10-CM

## 2024-06-19 DIAGNOSIS — J70.0 RADIATION PNEUMONITIS (H): ICD-10-CM

## 2024-06-19 PROCEDURE — 99214 OFFICE O/P EST MOD 30 MIN: CPT | Performed by: PHYSICIAN ASSISTANT

## 2024-06-19 PROCEDURE — G2211 COMPLEX E/M VISIT ADD ON: HCPCS | Performed by: PHYSICIAN ASSISTANT

## 2024-06-19 RX ORDER — RESPIRATORY SYNCYTIAL VIRUS VACCINE 120MCG/0.5
0.5 KIT INTRAMUSCULAR ONCE
Qty: 1 EACH | Refills: 0 | Status: CANCELLED | OUTPATIENT
Start: 2024-06-19 | End: 2024-06-19

## 2024-06-19 RX ORDER — FLUOXETINE 10 MG/1
10 CAPSULE ORAL DAILY
Qty: 30 CAPSULE | Refills: 1 | Status: SHIPPED | OUTPATIENT
Start: 2024-06-19 | End: 2024-07-29

## 2024-06-19 ASSESSMENT — PATIENT HEALTH QUESTIONNAIRE - PHQ9
5. POOR APPETITE OR OVEREATING: NOT AT ALL
SUM OF ALL RESPONSES TO PHQ QUESTIONS 1-9: 4

## 2024-06-19 ASSESSMENT — ENCOUNTER SYMPTOMS
SLEEP DISTURBANCE: 0
EYE PAIN: 0
VOMITING: 0
ABDOMINAL PAIN: 0
HALLUCINATIONS: 1
CHILLS: 0
SORE THROAT: 0
DYSURIA: 0
COLOR CHANGE: 0
COUGH: 0
DYSPHORIC MOOD: 0
HEMATURIA: 0
NERVOUS/ANXIOUS: 1
DECREASED CONCENTRATION: 0
SHORTNESS OF BREATH: 0
CONFUSION: 0
PALPITATIONS: 0
SEIZURES: 0
ARTHRALGIAS: 0
FEVER: 0
BACK PAIN: 0

## 2024-06-19 ASSESSMENT — ANXIETY QUESTIONNAIRES
7. FEELING AFRAID AS IF SOMETHING AWFUL MIGHT HAPPEN: NOT AT ALL
2. NOT BEING ABLE TO STOP OR CONTROL WORRYING: SEVERAL DAYS
GAD7 TOTAL SCORE: 3
3. WORRYING TOO MUCH ABOUT DIFFERENT THINGS: NOT AT ALL
5. BEING SO RESTLESS THAT IT IS HARD TO SIT STILL: NOT AT ALL
GAD7 TOTAL SCORE: 3
IF YOU CHECKED OFF ANY PROBLEMS ON THIS QUESTIONNAIRE, HOW DIFFICULT HAVE THESE PROBLEMS MADE IT FOR YOU TO DO YOUR WORK, TAKE CARE OF THINGS AT HOME, OR GET ALONG WITH OTHER PEOPLE: SOMEWHAT DIFFICULT
1. FEELING NERVOUS, ANXIOUS, OR ON EDGE: MORE THAN HALF THE DAYS
6. BECOMING EASILY ANNOYED OR IRRITABLE: NOT AT ALL

## 2024-06-19 NOTE — PROGRESS NOTES
Assessment & Plan     Pulmonary emphysema, unspecified emphysema type (H)  Radiation pneumonitis (H24)  Pulmonary nodules  Seen in the emergency department beginning of May for COPD exacerbation versus anxiety.  He was given albuterol nebulizer and this has been helping since he has been discharged.  He also received prednisone.  He states that his symptoms have fully resolved.  He did undergo a CT of his chest which showed some changes from radiation pneumonitis to fibrosis.  Overall he feels that his breathing is stable at this time.  He does have some pulmonary nodules which have been stable since his last CT.  He is not having any coughing, wheezing or shortness of breath at this time.  He is to continue with Dulera and albuterol as needed.  Due to his his CT changes to fibrosis I did recommend pulmonology follow-up but he would like to hold off since his breathing is stable at this time.    CT Chest 5/28/24  IMPRESSION:     1.  Unchanged enlargement of the main pulmonary artery which is associated with pulmonary hypertension. No acute pulmonary embolism.  2.  Symmetric small bilateral pleural effusions and related atelectasis.  3.  Evolution of airspace inflammation in the lingula and adjacent basilar left lower lobe compared to 2/14/2024 consistent with evolving radiation pneumonitis into fibrosis  4.  Emphysema.  5.  New small focal mixed attenuation opacity in the right apex consistent with acute parenchymal inflammation. Circumscribed right lower lobe nodules are stable and there is minimal change in a polygonal right middle lobe nodule favored for a reactive   intrapulmonary lymph node. Continued attention on follow-up.  6.  Bilateral pleural plaque with calcification, a marker of asbestos exposure.  7.  Cholelithiasis.    Anxiety  Continues to struggle with anxiety.  States that he worries about little things.  He gets very shaky.  He is not having good sleep due to the anxiety.  He states he does have  a good social support.  He would like to start a medication to help with anxiety.  Will start with Prozac 10 mg daily.  Side effects of the medication discussed.  Recommended following up in 1 month  - FLUoxetine (PROZAC) 10 MG capsule; Take 1 capsule (10 mg) by mouth daily    Insomnia, unspecified type  Discontinue trazodone after taking this for 2 weeks.  He feels that it was causing shortness of breath and vision changes.  Since stopping the trazodone his symptoms have fully resolved.  He continues to struggle with insomnia.  Recommended starting Prozac at bedtime to see if this is truly anxiety that is causing his insomnia.  Also recommended could start melatonin 5 to 10 mg as well.  Follow-up in 1 month for recheck.    The longitudinal plan of care for the diagnosis(es)/condition(s) as documented were addressed during this visit. Due to the added complexity in care, I will continue to support Karen in the subsequent management and with ongoing continuity of care.        Subjective   Karen is a 89 year old, presenting for the following health issues:  Anxiety (Pt reports that he has been more anxious than normal recently. Says  that he was taking sleep aids to help sleep x 2 weeks and one of the side effects was trouble breathing and vision concerns. After stopping taking the meds, everything resumed back to normal)        6/19/2024     4:08 PM   Additional Questions   Roomed by Zuleyka Sheehan   Accompanied by dustin     Patient presents to the clinic today for ER follow-up.  He was seen in the ER 5/28/2024 for COPD exacerbation.  At that time he was denying any chest pain, chest pressure, cough or fevers.  In the ER he was slightly hypertensive.  Oxygen sats were in the upper 90s on room air.  He did receive a DuoNeb treatment and steroids.  Elevated D-dimer at 1.8 then underwent CT chest for PE evaluation.  CT was negative for pulmonary embolism but did show emphysematous changes.  He does have known COPD.  CT  chest with PE study showed unchanged enlargement of the main pulmonary artery which could be associated with pulmonary hypertension but no pulmonary embolism noted.  Bilateral small pulmonary embolism with associated atelectasis, inflammation in the lingula and adjacent basilar left lower lobe when compared to CT 2/14/2024 consistent with evolving radiation pneumonitis into fibrosis.  New small focal mixed at opacity in the right apex consistent with acute inflammatory changes.  R pulmonary nodules stable.    When talking to him today he feels that his shortness of breath leading him to go to the ER could have been due to worsening anxiety.  He was on trazodone for 2 weeks prior to going into the ER but it was causing the shortness of breath and vision changes.  He has stopped taking the trazodone and his symptoms improved.  Sleep is still okay.  He continues to have issues with anxiety.  He states when he gets super anxious he shakes.  He states that he is constantly worrying about little things.        History of Present Illness       He eats 0-1 servings of fruits and vegetables daily.He consumes 0 sweetened beverage(s) daily.He exercises with enough effort to increase his heart rate 9 or less minutes per day.  He exercises with enough effort to increase his heart rate 3 or less days per week.   He is taking medications regularly.       Review of Systems   Constitutional:  Negative for chills and fever.   HENT:  Negative for ear pain and sore throat.    Eyes:  Negative for pain and visual disturbance.   Respiratory:  Negative for cough and shortness of breath.    Cardiovascular:  Negative for chest pain and palpitations.   Gastrointestinal:  Negative for abdominal pain and vomiting.   Genitourinary:  Negative for dysuria and hematuria.   Musculoskeletal:  Negative for arthralgias and back pain.   Skin:  Negative for color change and rash.   Neurological:  Negative for seizures and syncope.  "  Psychiatric/Behavioral:  Positive for hallucinations. Negative for confusion, decreased concentration, dysphoric mood, self-injury, sleep disturbance and suicidal ideas. The patient is nervous/anxious.    All other systems reviewed and are negative.          Objective    /72 (BP Location: Left arm, Patient Position: Sitting, Cuff Size: Adult Regular)   Pulse 84   Temp 98.2  F (36.8  C) (Oral)   Resp 18   Ht 1.803 m (5' 11\")   Wt 74.8 kg (165 lb)   SpO2 97%   BMI 23.01 kg/m    Body mass index is 23.01 kg/m .  Physical Exam  Vitals and nursing note reviewed.   Constitutional:       General: He is not in acute distress.     Appearance: Normal appearance. He is not ill-appearing, toxic-appearing or diaphoretic.   Cardiovascular:      Rate and Rhythm: Normal rate and regular rhythm.      Heart sounds: No murmur heard.     No friction rub. No gallop.   Pulmonary:      Effort: Pulmonary effort is normal.      Breath sounds: No wheezing, rhonchi or rales.   Skin:     General: Skin is warm and dry.   Neurological:      General: No focal deficit present.      Mental Status: He is alert and oriented to person, place, and time.      Motor: No weakness.   Psychiatric:         Mood and Affect: Mood normal.         Behavior: Behavior normal.         Thought Content: Thought content normal.         Judgment: Judgment normal.                Signed Electronically by: Howard Luu PA-C    "

## 2024-06-27 DIAGNOSIS — J43.9 PULMONARY EMPHYSEMA, UNSPECIFIED EMPHYSEMA TYPE (H): ICD-10-CM

## 2024-06-27 RX ORDER — MOMETASONE FUROATE AND FORMOTEROL FUMARATE DIHYDRATE 100; 5 UG/1; UG/1
AEROSOL RESPIRATORY (INHALATION)
Qty: 13 G | Refills: 1 | Status: SHIPPED | OUTPATIENT
Start: 2024-06-27 | End: 2024-08-19

## 2024-07-03 ENCOUNTER — TELEPHONE (OUTPATIENT)
Dept: FAMILY MEDICINE | Facility: CLINIC | Age: 89
End: 2024-07-03
Payer: COMMERCIAL

## 2024-07-03 ENCOUNTER — OFFICE VISIT (OUTPATIENT)
Dept: INTERNAL MEDICINE | Facility: CLINIC | Age: 89
End: 2024-07-03
Payer: COMMERCIAL

## 2024-07-03 VITALS
WEIGHT: 166 LBS | HEIGHT: 71 IN | TEMPERATURE: 97.8 F | OXYGEN SATURATION: 96 % | SYSTOLIC BLOOD PRESSURE: 110 MMHG | HEART RATE: 82 BPM | DIASTOLIC BLOOD PRESSURE: 70 MMHG | BODY MASS INDEX: 23.24 KG/M2 | RESPIRATION RATE: 18 BRPM

## 2024-07-03 DIAGNOSIS — L08.9 INFECTED SKIN TEAR: ICD-10-CM

## 2024-07-03 DIAGNOSIS — T14.8XXA WOUND INFECTION: Primary | ICD-10-CM

## 2024-07-03 DIAGNOSIS — L08.9 WOUND INFECTION: Primary | ICD-10-CM

## 2024-07-03 DIAGNOSIS — T14.8XXA INFECTED SKIN TEAR: ICD-10-CM

## 2024-07-03 PROCEDURE — 99214 OFFICE O/P EST MOD 30 MIN: CPT | Performed by: NURSE PRACTITIONER

## 2024-07-03 NOTE — TELEPHONE ENCOUNTER
Daughter Kitty and Karen calling. Karen gives me permission to speak to his daughter Kitty. Advised needs new CTC as spouse who is no longer living is listed.     Got scraped by tree branch Monday while mowing, 1 area that looks like it might be getting infected, he wants to be seen. Wound is weeping blood and they are needing to wipe it now and then. Abraham applied.  Says went to  urgent care near their  house and it was not accepting patients today. Kitty is not wanting to have me triage further she is just wanting to find an open urgency room or UC. Advised address of [facility]. They will proceed to  for further evaluation.     Cassi Vasquez R.N.

## 2024-07-03 NOTE — PATIENT INSTRUCTIONS
I started you on Augmentin twice daily for 14 days for wound infection.  Take this with food in the morning and in the evening.  I would recommend use of a probiotic daily at lunchtime while on an antibiotic to prevent diarrhea.  You can either use a probiotic supplement which she can buy over-the-counter or you can eat a yogurt daily that has probiotics in it.       Dial unscented antibacterial hand soap.  Uses to wash the area twice daily.  Pat the area dry after gently washing.  Apply a triple antibiotic ointment or bacitracin.  You then will use a nonadherent dressing to the area and can secure with Coban.    You should clean the wound and change the dressing twice daily.  Once you have a good scab you can leave it open to the air.    If things seem like they are getting worse over the next several days I am in office on Friday and again on Monday.  You can come and see me if there is any concerns or give us a call.  If you develop fever, thick white drainage from the wound, worsening redness up the arm I would recommend going to the ER or urgent care over the weekend if needed.    Call anytime you have questions

## 2024-07-03 NOTE — PROGRESS NOTES
Assessment & Plan     Wound infection  Findings of physical exam are consistent with an infected wound and skin tear.  Started on Augmentin twice daily for 14 days due to the severity of the wound and anticipate this will take a lot longer to fully heal.  Reviewed proper wound care including washing the area with an antibacterial hand soap twice daily.  Recommend using a triple antibiotic ointment to the area and a nonadherent dressing.  We cleansed the wound well with sterile saline in the visit and applied triple antibiotic ointment and a Telfa.  We have secured this with Coban.  At this time there is no way to offer any repair of this wound due to the length of time it has been open and that type of wound it is.  Reviewed the importance of getting seen immediately in the future with this type of wound.  - amoxicillin-clavulanate (AUGMENTIN) 875-125 MG tablet; Take 1 tablet by mouth 2 times daily for 14 days    Infected skin tear  - amoxicillin-clavulanate (AUGMENTIN) 875-125 MG tablet; Take 1 tablet by mouth 2 times daily for 14 days        Samara Jones is a 89 year old, presenting for the following health issues:  Wound Check (Wound right lower arm)      7/3/2024     9:12 AM   Additional Questions   Roomed by Josefa RODRIGUEZ   Was riding his bladder more on Monday when he hit a branch on a tree.  Tried to back up but it was too late.  Had a large skin tear to the right wrist and forearm area.  States he has been putting on Neosporin and some gauze but when he went to remove it today it stuck on there and started bleeding again.  His daughter came over and looked at the wound after he told her about it and she said that she noticed a lot of redness.  Concern for possible infection.  States that it does not appear it is healing well.  Has been afebrile.  States is not very painful.  Has not had any drainage from the wound yet.  History of Present Illness       Reason for visit:  Injury right arm  Symptom onset:   "1-3 days ago  Symptoms include:  Red and sore  Symptom intensity:  Mild  Symptom progression:  Worsening  Had these symptoms before:  No    He eats 0-1 servings of fruits and vegetables daily.He consumes 0 sweetened beverage(s) daily.He exercises with enough effort to increase his heart rate 20 to 29 minutes per day.  He exercises with enough effort to increase his heart rate 7 days per week.   He is taking medications regularly.       ROS  Comprehensive 12-point review of systems was completed and negative except as noted in HPI.        Objective    /70 (BP Location: Right arm, Patient Position: Sitting)   Pulse 82   Temp 97.8  F (36.6  C)   Resp 18   Ht 1.803 m (5' 10.98\")   Wt 75.3 kg (166 lb)   SpO2 96%   BMI 23.16 kg/m    Body mass index is 23.16 kg/m .  Physical Exam   Constitutional: In no acute distress.  Clean appearance.  Cardiovascular: Regular rate.  Respiratory: Normal respiratory effort.  Skin: Skin is pink, warm and dry.   Back of the right forearm with all large open skin tear.  Second smaller skin tear noted proximal to the larger wound.  Both with significant surrounding erythema that is hot to the touch.  No purulent drainage.  Some mild tenderness with palpation.  Musculoskeletal: Gait normal.  Able to mount exam table without difficulties.  Psychiatric: Appropriate affect and demeanor.  Memory intact.  Good insight and judgment.  Neurologic: No tremor or involuntary movement noted.          Signed Electronically by: Linnea Betancourt NP    "

## 2024-07-29 ENCOUNTER — OFFICE VISIT (OUTPATIENT)
Dept: FAMILY MEDICINE | Facility: CLINIC | Age: 89
End: 2024-07-29
Payer: COMMERCIAL

## 2024-07-29 VITALS
DIASTOLIC BLOOD PRESSURE: 69 MMHG | WEIGHT: 167 LBS | BODY MASS INDEX: 23.38 KG/M2 | RESPIRATION RATE: 16 BRPM | OXYGEN SATURATION: 97 % | HEART RATE: 63 BPM | TEMPERATURE: 98.1 F | HEIGHT: 71 IN | SYSTOLIC BLOOD PRESSURE: 139 MMHG

## 2024-07-29 DIAGNOSIS — F41.9 ANXIETY: Primary | ICD-10-CM

## 2024-07-29 DIAGNOSIS — M17.11 PRIMARY OSTEOARTHRITIS OF RIGHT KNEE: ICD-10-CM

## 2024-07-29 PROCEDURE — 99214 OFFICE O/P EST MOD 30 MIN: CPT | Performed by: PHYSICIAN ASSISTANT

## 2024-07-29 RX ORDER — AMOXICILLIN 500 MG/1
2000 CAPSULE ORAL ONCE
Qty: 4 CAPSULE | Refills: 0 | Status: SHIPPED | OUTPATIENT
Start: 2024-07-29 | End: 2024-08-16

## 2024-07-29 ASSESSMENT — PATIENT HEALTH QUESTIONNAIRE - PHQ9
SUM OF ALL RESPONSES TO PHQ QUESTIONS 1-9: 2
10. IF YOU CHECKED OFF ANY PROBLEMS, HOW DIFFICULT HAVE THESE PROBLEMS MADE IT FOR YOU TO DO YOUR WORK, TAKE CARE OF THINGS AT HOME, OR GET ALONG WITH OTHER PEOPLE: NOT DIFFICULT AT ALL
SUM OF ALL RESPONSES TO PHQ QUESTIONS 1-9: 2

## 2024-07-29 ASSESSMENT — ANXIETY QUESTIONNAIRES
6. BECOMING EASILY ANNOYED OR IRRITABLE: NOT AT ALL
4. TROUBLE RELAXING: NOT AT ALL
3. WORRYING TOO MUCH ABOUT DIFFERENT THINGS: SEVERAL DAYS
2. NOT BEING ABLE TO STOP OR CONTROL WORRYING: SEVERAL DAYS
5. BEING SO RESTLESS THAT IT IS HARD TO SIT STILL: NOT AT ALL
GAD7 TOTAL SCORE: 3
8. IF YOU CHECKED OFF ANY PROBLEMS, HOW DIFFICULT HAVE THESE MADE IT FOR YOU TO DO YOUR WORK, TAKE CARE OF THINGS AT HOME, OR GET ALONG WITH OTHER PEOPLE?: SOMEWHAT DIFFICULT
GAD7 TOTAL SCORE: 3
7. FEELING AFRAID AS IF SOMETHING AWFUL MIGHT HAPPEN: NOT AT ALL
IF YOU CHECKED OFF ANY PROBLEMS ON THIS QUESTIONNAIRE, HOW DIFFICULT HAVE THESE PROBLEMS MADE IT FOR YOU TO DO YOUR WORK, TAKE CARE OF THINGS AT HOME, OR GET ALONG WITH OTHER PEOPLE: SOMEWHAT DIFFICULT
1. FEELING NERVOUS, ANXIOUS, OR ON EDGE: SEVERAL DAYS
GAD7 TOTAL SCORE: 3
7. FEELING AFRAID AS IF SOMETHING AWFUL MIGHT HAPPEN: NOT AT ALL

## 2024-07-29 ASSESSMENT — ENCOUNTER SYMPTOMS
SLEEP DISTURBANCE: 0
NERVOUS/ANXIOUS: 1
DYSPHORIC MOOD: 0
COLOR CHANGE: 0
VOMITING: 0
EYE PAIN: 0
COUGH: 0
SORE THROAT: 0
FEVER: 0
BACK PAIN: 0
PALPITATIONS: 0
HALLUCINATIONS: 0
SHORTNESS OF BREATH: 0
SEIZURES: 0
DECREASED CONCENTRATION: 0
ABDOMINAL PAIN: 0
DYSURIA: 0
HEMATURIA: 0
CHILLS: 0
ARTHRALGIAS: 0
CONFUSION: 0

## 2024-07-29 ASSESSMENT — PAIN SCALES - GENERAL: PAINLEVEL: NO PAIN (0)

## 2024-07-29 NOTE — PROGRESS NOTES
Assessment & Plan     Anxiety  Symptoms are better with Prozac 10 mg daily but continues to have daily struggles.  He is struggling with overthinking and constantly worrying about things he should not be.  He has not noticed any side effects from his medication.  Since he has had some improvement we will increase Prozac to 20 mg daily.  We did discuss side effects of the increase.  Recommended following up in 1 month if symptoms or not better.  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  He will be undergoing dental work and needs antibiotics.  Medication sent to the pharmacy.  - amoxicillin (AMOXIL) 500 MG capsule; Take 4 capsules (2,000 mg) by mouth once for 1 dose          Samara Jones is a 89 year old, presenting for the following health issues:  RECHECK (Follow up still having some anxiety ) and Refill Request (Medication for  seeing dentist )        7/29/2024     9:16 AM   Additional Questions   Roomed by emma sanz cma     The patient is a pleasant 89-year-old male who presents to the clinic today for follow-up on anxiety.  He was started on Prozac 10 mg daily approximately 5 weeks ago and has noted some improvement with his anxiety but continues to have symptoms.  He states that he is excessively worrying about things that he should not be.  He does note that he is sleeping much better with the use of melatonin and Prozac.    History of Present Illness       Mental Health Follow-up:  Patient presents to follow-up on Anxiety.    Patient's anxiety since last visit has been:  Medium  The patient is not having other symptoms associated with anxiety.  Any significant life events: grief or loss  Patient is feeling anxious or having panic attacks.  Patient has no concerns about alcohol or drug use.    He eats 0-1 servings of fruits and vegetables daily.He consumes 0  "sweetened beverage(s) daily.He exercises with enough effort to increase his heart rate 20 to 29 minutes per day.  He exercises with enough effort to increase his heart rate 7 days per week.   He is taking medications regularly.     Review of Systems   Constitutional:  Negative for chills and fever.   HENT:  Negative for ear pain and sore throat.    Eyes:  Negative for pain and visual disturbance.   Respiratory:  Negative for cough and shortness of breath.    Cardiovascular:  Negative for chest pain and palpitations.   Gastrointestinal:  Negative for abdominal pain and vomiting.   Genitourinary:  Negative for dysuria and hematuria.   Musculoskeletal:  Negative for arthralgias and back pain.   Skin:  Negative for color change and rash.   Neurological:  Negative for seizures and syncope.   Psychiatric/Behavioral:  Negative for confusion, decreased concentration, dysphoric mood, hallucinations, self-injury, sleep disturbance and suicidal ideas. The patient is nervous/anxious.    All other systems reviewed and are negative.          Objective    /69   Pulse 63   Temp 98.1  F (36.7  C)   Resp 16   Ht 1.791 m (5' 10.5\")   Wt 75.8 kg (167 lb)   SpO2 97%   BMI 23.62 kg/m    Body mass index is 23.62 kg/m .  Physical Exam  Vitals and nursing note reviewed.   Constitutional:       General: He is not in acute distress.     Appearance: Normal appearance. He is well-developed and well-groomed. He is not ill-appearing or toxic-appearing.   HENT:      Head: Normocephalic and atraumatic.      Right Ear: Tympanic membrane, ear canal and external ear normal.      Left Ear: Tympanic membrane, ear canal and external ear normal.      Mouth/Throat:      Lips: Pink.      Mouth: Mucous membranes are moist.      Palate: No mass.      Pharynx: Oropharynx is clear. Uvula midline.      Tonsils: No tonsillar exudate or tonsillar abscesses.   Eyes:      General: Lids are normal.         Right eye: No discharge.         Left eye: No " discharge.   Neck:      Trachea: Trachea normal.   Cardiovascular:      Rate and Rhythm: Normal rate and regular rhythm.      Heart sounds: S1 normal and S2 normal. No murmur heard.  Pulmonary:      Effort: Pulmonary effort is normal.      Breath sounds: Normal breath sounds and air entry.   Abdominal:      General: Bowel sounds are normal. There is no distension.      Palpations: Abdomen is soft.      Tenderness: There is no abdominal tenderness. There is no guarding or rebound.   Musculoskeletal:      Cervical back: Full passive range of motion without pain and neck supple.      Right lower leg: No edema.      Left lower leg: No edema.   Lymphadenopathy:      Cervical: No cervical adenopathy.   Skin:     General: Skin is warm and dry.      Findings: No lesion or rash.   Neurological:      General: No focal deficit present.      Mental Status: He is alert.      Cranial Nerves: No cranial nerve deficit.      Gait: Gait is intact.      Deep Tendon Reflexes:      Reflex Scores:       Patellar reflexes are 2+ on the right side and 2+ on the left side.  Psychiatric:         Attention and Perception: Attention normal.         Mood and Affect: Mood normal.         Speech: Speech normal.                    Signed Electronically by: Howard Luu PA-C

## 2024-08-14 DIAGNOSIS — M17.11 PRIMARY OSTEOARTHRITIS OF RIGHT KNEE: ICD-10-CM

## 2024-08-15 NOTE — TELEPHONE ENCOUNTER
Patient requesting refill of amoxicillin medication.     Patient states he went to dentist and needs a filling replaced.     States dentist told him to take antibiotic an hour before the procedure on 8/27/24.     Denies any other symptoms or need for antibiotic.     Routing to provider to review.     Lizbeth Ace RN  [facility]

## 2024-08-16 RX ORDER — AMOXICILLIN 500 MG/1
2000 CAPSULE ORAL ONCE
Qty: 4 CAPSULE | Refills: 0 | Status: SHIPPED | OUTPATIENT
Start: 2024-08-16 | End: 2024-08-16

## 2024-08-18 DIAGNOSIS — J43.9 PULMONARY EMPHYSEMA, UNSPECIFIED EMPHYSEMA TYPE (H): ICD-10-CM

## 2024-08-19 RX ORDER — MOMETASONE FUROATE AND FORMOTEROL FUMARATE DIHYDRATE 100; 5 UG/1; UG/1
AEROSOL RESPIRATORY (INHALATION)
Qty: 13 G | Refills: 3 | Status: SHIPPED | OUTPATIENT
Start: 2024-08-19 | End: 2024-09-24

## 2024-08-28 ENCOUNTER — PATIENT OUTREACH (OUTPATIENT)
Dept: CARE COORDINATION | Facility: CLINIC | Age: 89
End: 2024-08-28
Payer: COMMERCIAL

## 2024-09-24 ENCOUNTER — OFFICE VISIT (OUTPATIENT)
Dept: FAMILY MEDICINE | Facility: CLINIC | Age: 89
End: 2024-09-24
Payer: COMMERCIAL

## 2024-09-24 VITALS
WEIGHT: 173 LBS | DIASTOLIC BLOOD PRESSURE: 58 MMHG | HEART RATE: 72 BPM | HEIGHT: 71 IN | SYSTOLIC BLOOD PRESSURE: 122 MMHG | BODY MASS INDEX: 24.22 KG/M2 | TEMPERATURE: 98.3 F | OXYGEN SATURATION: 95 % | RESPIRATION RATE: 14 BRPM

## 2024-09-24 DIAGNOSIS — C66.1 MALIGNANT NEOPLASM OF RIGHT URETER (H): ICD-10-CM

## 2024-09-24 DIAGNOSIS — C34.12 MALIGNANT NEOPLASM OF UPPER LOBE OF LEFT LUNG (H): ICD-10-CM

## 2024-09-24 DIAGNOSIS — M81.0 AGE RELATED OSTEOPOROSIS, UNSPECIFIED PATHOLOGICAL FRACTURE PRESENCE: ICD-10-CM

## 2024-09-24 DIAGNOSIS — J43.9 PULMONARY EMPHYSEMA, UNSPECIFIED EMPHYSEMA TYPE (H): ICD-10-CM

## 2024-09-24 DIAGNOSIS — E53.8 B12 DEFICIENCY: ICD-10-CM

## 2024-09-24 DIAGNOSIS — E55.9 VITAMIN D DEFICIENCY: ICD-10-CM

## 2024-09-24 DIAGNOSIS — I10 ESSENTIAL HYPERTENSION, BENIGN: Primary | ICD-10-CM

## 2024-09-24 DIAGNOSIS — D50.9 IRON DEFICIENCY ANEMIA, UNSPECIFIED IRON DEFICIENCY ANEMIA TYPE: ICD-10-CM

## 2024-09-24 DIAGNOSIS — F51.01 PRIMARY INSOMNIA: ICD-10-CM

## 2024-09-24 DIAGNOSIS — I25.10 CORONARY ARTERY DISEASE DUE TO LIPID RICH PLAQUE: ICD-10-CM

## 2024-09-24 DIAGNOSIS — I25.83 CORONARY ARTERY DISEASE DUE TO LIPID RICH PLAQUE: ICD-10-CM

## 2024-09-24 DIAGNOSIS — R91.8 MULTIPLE PULMONARY NODULES: ICD-10-CM

## 2024-09-24 DIAGNOSIS — C67.9 MALIGNANT NEOPLASM OF URINARY BLADDER, UNSPECIFIED SITE (H): ICD-10-CM

## 2024-09-24 DIAGNOSIS — D64.9 ANEMIA, UNSPECIFIED TYPE: ICD-10-CM

## 2024-09-24 DIAGNOSIS — J70.0 RADIATION PNEUMONITIS (H): ICD-10-CM

## 2024-09-24 DIAGNOSIS — F41.9 ANXIETY: ICD-10-CM

## 2024-09-24 DIAGNOSIS — J84.112 IPF (IDIOPATHIC PULMONARY FIBROSIS) (H): ICD-10-CM

## 2024-09-24 DIAGNOSIS — N18.31 CHRONIC KIDNEY DISEASE, STAGE 3A (H): ICD-10-CM

## 2024-09-24 DIAGNOSIS — Z93.6 S/P ILEAL CONDUIT (H): ICD-10-CM

## 2024-09-24 LAB
ALBUMIN SERPL BCG-MCNC: 4.2 G/DL (ref 3.5–5.2)
ALP SERPL-CCNC: 55 U/L (ref 40–150)
ALT SERPL W P-5'-P-CCNC: 14 U/L (ref 0–70)
ANION GAP SERPL CALCULATED.3IONS-SCNC: 11 MMOL/L (ref 7–15)
AST SERPL W P-5'-P-CCNC: 23 U/L (ref 0–45)
BILIRUB SERPL-MCNC: 0.4 MG/DL
BUN SERPL-MCNC: 20.9 MG/DL (ref 8–23)
CALCIUM SERPL-MCNC: 9.1 MG/DL (ref 8.8–10.4)
CHLORIDE SERPL-SCNC: 102 MMOL/L (ref 98–107)
CHOLEST SERPL-MCNC: 193 MG/DL
CREAT SERPL-MCNC: 0.8 MG/DL (ref 0.67–1.17)
EGFRCR SERPLBLD CKD-EPI 2021: 85 ML/MIN/1.73M2
ERYTHROCYTE [DISTWIDTH] IN BLOOD BY AUTOMATED COUNT: 14.9 % (ref 10–15)
FASTING STATUS PATIENT QL REPORTED: YES
FASTING STATUS PATIENT QL REPORTED: YES
GLUCOSE SERPL-MCNC: 109 MG/DL (ref 70–99)
HCO3 SERPL-SCNC: 25 MMOL/L (ref 22–29)
HCT VFR BLD AUTO: 38.6 % (ref 40–53)
HDLC SERPL-MCNC: 54 MG/DL
HGB BLD-MCNC: 12.5 G/DL (ref 13.3–17.7)
LDLC SERPL CALC-MCNC: 122 MG/DL
MCH RBC QN AUTO: 31.2 PG (ref 26.5–33)
MCHC RBC AUTO-ENTMCNC: 32.4 G/DL (ref 31.5–36.5)
MCV RBC AUTO: 96 FL (ref 78–100)
NONHDLC SERPL-MCNC: 139 MG/DL
PLATELET # BLD AUTO: 180 10E3/UL (ref 150–450)
POTASSIUM SERPL-SCNC: 4.7 MMOL/L (ref 3.4–5.3)
PROT SERPL-MCNC: 6.6 G/DL (ref 6.4–8.3)
RBC # BLD AUTO: 4.01 10E6/UL (ref 4.4–5.9)
SODIUM SERPL-SCNC: 138 MMOL/L (ref 135–145)
TRIGL SERPL-MCNC: 85 MG/DL
VIT B12 SERPL-MCNC: 663 PG/ML (ref 232–1245)
VIT D+METAB SERPL-MCNC: 40 NG/ML (ref 20–50)
WBC # BLD AUTO: 4.3 10E3/UL (ref 4–11)

## 2024-09-24 PROCEDURE — 80053 COMPREHEN METABOLIC PANEL: CPT | Performed by: PHYSICIAN ASSISTANT

## 2024-09-24 PROCEDURE — 80061 LIPID PANEL: CPT | Performed by: PHYSICIAN ASSISTANT

## 2024-09-24 PROCEDURE — 90662 IIV NO PRSV INCREASED AG IM: CPT | Performed by: PHYSICIAN ASSISTANT

## 2024-09-24 PROCEDURE — 85027 COMPLETE CBC AUTOMATED: CPT | Performed by: PHYSICIAN ASSISTANT

## 2024-09-24 PROCEDURE — 82607 VITAMIN B-12: CPT | Performed by: PHYSICIAN ASSISTANT

## 2024-09-24 PROCEDURE — 82306 VITAMIN D 25 HYDROXY: CPT | Performed by: PHYSICIAN ASSISTANT

## 2024-09-24 PROCEDURE — 99214 OFFICE O/P EST MOD 30 MIN: CPT | Mod: 25 | Performed by: PHYSICIAN ASSISTANT

## 2024-09-24 PROCEDURE — 91320 SARSCV2 VAC 30MCG TRS-SUC IM: CPT | Performed by: PHYSICIAN ASSISTANT

## 2024-09-24 PROCEDURE — 90480 ADMN SARSCOV2 VAC 1/ONLY CMP: CPT | Performed by: PHYSICIAN ASSISTANT

## 2024-09-24 PROCEDURE — G0008 ADMIN INFLUENZA VIRUS VAC: HCPCS | Performed by: PHYSICIAN ASSISTANT

## 2024-09-24 PROCEDURE — 36415 COLL VENOUS BLD VENIPUNCTURE: CPT | Performed by: PHYSICIAN ASSISTANT

## 2024-09-24 PROCEDURE — G0439 PPPS, SUBSEQ VISIT: HCPCS | Performed by: PHYSICIAN ASSISTANT

## 2024-09-24 RX ORDER — MOMETASONE FUROATE AND FORMOTEROL FUMARATE DIHYDRATE 100; 5 UG/1; UG/1
2 AEROSOL RESPIRATORY (INHALATION) 2 TIMES DAILY
Qty: 13 G | Refills: 3 | Status: SHIPPED | OUTPATIENT
Start: 2024-09-24

## 2024-09-24 NOTE — PROGRESS NOTES
Preventive Care Visit  [facility]  Howard Luu PA-C, Family Medicine  Sep 24, 2024      Assessment & Plan     Essential hypertension, benign  Blood pressure well-controlled at 122/58.  Continue losartan.    Coronary artery disease due to lipid rich plaque  CT chest 5/23 showed a moderate coronary artery disease.  Underwent stress test 3/6/2024 which showed mild transmural infarct of the basal segment of the left ventricle.  Cardiology recommended a 6-month follow-up.  He has not seen them.  Will have him follow-up with cardiology.  He is not having any chest pain, shortness of breath, lightheaded or dizziness.  Will update lipid panel today.  Not currently on statin therapy.  - Lipid panel reflex to direct LDL Fasting; Future  - Adult Cardiology Eval  Referral; Future  - Lipid panel reflex to direct LDL Fasting    Chronic kidney disease, stage 3a (H)  Last kidney function stable.  Blood pressure is well-controlled.  He is to avoid nephrotoxic medications.  - Comprehensive metabolic panel (BMP + Alb, Alk Phos, ALT, AST, Total. Bili, TP); Future  - Comprehensive metabolic panel (BMP + Alb, Alk Phos, ALT, AST, Total. Bili, TP)    Anemia, unspecified type  Will check a CBC.  Last hemoglobin 5/24 stable at 11.3    IPF (idiopathic pulmonary fibrosis) (H)  Pulmonary emphysema, unspecified emphysema type (H)  Radiation pneumonitis (H24)  Underwent CT chest 5/28/2024 which showed emphysema, idiopathic pulmonary fibrosis, and a new small right apex opacification.  His COPD has been much better since he has been started on the Dulera.  Recommended pulmonology follow-up but he declined.  We will continue to monitor at this time.  - mometasone-formoterol (DULERA) 100-5 MCG/ACT inhaler; Inhale 2 puffs into the lungs 2 times daily.    Malignant neoplasm of right ureter (H)  Malignant neoplasm of urinary bladder, unspecified site (H)  S/P ileal conduit (H)  Diagnosed in 2002.  Recurrence  in 2012.  Squamous cell cancer of the bladder.  Biopsy 2021 was negative for reoccurrence.  Follows with radiation oncology.  Status post ileal conduct.  Overall doing well.    Multiple pulmonary nodules  Recommended repeat CT chest but patient declined at this time    Anxiety  Continue with Prozac.  Symptoms have significantly improved.    Primary insomnia  Using melatonin as needed    Iron deficiency anemia, unspecified iron deficiency anemia type  Will check a CBC level today.    Vitamin D deficiency  Will check vitamin D level  - Vitamin D Deficiency; Future  - Vitamin D Deficiency    B12 deficiency  Will check a B12 level  - CBC with platelets; Future  - Vitamin B12; Future  - CBC with platelets  - Vitamin B12    Age related osteoporosis, unspecified pathological fracture presence  Drea states he does have a history of osteoporosis that was diagnosed many years ago by [facility].  Offered to do DEXA scan but declined.  Recommended continue with calcium and vitamin D supplements.    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     Follow-up Visit   Expected date:  Mar 24, 2025 (Approximate)      Follow Up Appointment Details:     Follow-up with whom?: Me    Follow-Up for what?: Adult Preventive    How?: In Person                     Current Outpatient Medications   Medication Sig Dispense Refill    acetaminophen (TYLENOL) 325 MG tablet Take 2 tablets (650 mg) by mouth every 4 hours as needed for other (mild pain) 100 tablet 0    albuterol (PROAIR HFA/PROVENTIL HFA/VENTOLIN HFA) 108 (90 Base) MCG/ACT inhaler INHALE TWO PUFFS BY MOUTH EVERY FOUR HOURS AS NEEDED FOR WHEEZING 18 g 1    amLODIPine (NORVASC) 10 MG tablet TAKE ONE TABLET BY MOUTH ONE TIME DAILY 90 tablet 3    FLUoxetine (PROZAC) 20 MG capsule Take 1 capsule (20 mg) by mouth daily 90 capsule 3    losartan (COZAAR) 100 MG tablet TAKE ONE TABLET BY MOUTH ONE TIME DAILY 90 tablet 3    mometasone-formoterol (DULERA)  100-5 MCG/ACT inhaler Inhale 2 puffs into the lungs 2 times daily. 13 g 3    Multiple Vitamins-Iron (MULTI-VITAMIN  /IRON) TABS Take 1 tablet by mouth daily      vitamin B complex with vitamin C (STRESS TAB) tablet Take 1 tablet by mouth every evening      vitamin B-12 (CYANOCOBALAMIN) 100 MCG tablet Take 100 mcg by mouth daily      vitamin D3 (CHOLECALCIFEROL) 50 mcg (2000 units) tablet Take 2 tablets by mouth daily      albuterol (PROVENTIL) (2.5 MG/3ML) 0.083% neb solution Take 2 vials (5 mg) by nebulization every 4 hours as needed for shortness of breath or wheezing 90 mL 0     No current facility-administered medications for this visit.         Patient has been advised of split billing requirements and indicates understanding: Yes        Counseling  Appropriate preventive services were addressed with this patient via screening, questionnaire, or discussion as appropriate for fall prevention, nutrition, physical activity, Tobacco-use cessation, social engagement, weight loss and cognition.  Checklist reviewing preventive services available has been given to the patient.  Reviewed patient's diet, addressing concerns and/or questions.   The patient was provided with written information regarding signs of hearing loss.           Subjective   Karen is a 89 year old, presenting for the following:  Annual Visit (Fasting Annual Medicare Wellness)        9/24/2024    10:06 AM   Additional Questions   Roomed by Zuleyka Sheehan   Accompanied by none         Health Care Directive  Patient does not have a Health Care Directive or Living Will: Discussed advance care planning with patient; however, patient declined at this time.    Drea is a pleasant 89-year-old male who presents to the clinic today for a Medicare annual wellness visit.  Past medical history significant for hypertension, coronary artery disease, chronic kidney disease, history of ureter/bladder cancer s/p ileal conduct, COPD, radiation pneumonitis, vitamin D D  deficiency, B12 deficiency, insomnia, and osteoporosis.  Overall he is doing well.    Recently started on Prozac for anxiety.  He has noted a significant improvement in his anxiety since starting the medication.  He is worrying about less and feeling much better.  He is not having any side effects.    He also notes that his coughing, wheezing and shortness of breath is much better since starting Dulera approximately 6 months ago.  He is also using albuterol as needed but less frequently since he has been on the Dulera.  He does have a history of radiation pneumonitis and pulmonary nodule.  He was seen in the ER 5/28/2024 underwent a CT of his chest that showed pulmonary hypertension, emphysema, and a new small right apex opacification.  We did have a visit 6/24 recommended pulmonology follow-up but he declined at that time.    History of ureter/bladder cancer diagnosed in 2002 with reoccurrence in 2012.  Underwent biopsy 2021 which was negative for reoccurrence.  Last visit with radiation oncology 923 everything looks stable at that time they did recommend a 1 year follow-up.    He did see cardiology 2/24 for cardiac clearance prior to right knee replacement.  He did undergo a stress test 3/6/2024 which showed mild transmural infarct to the basal septal left ventricle but no ischemia noted.  EF of 66%.  Cardiology cleared him for his procedure but recommended a 6-month follow-up which she has not done at this time.            9/24/2024   General Health   How would you rate your overall physical health? Good   Feel stress (tense, anxious, or unable to sleep) To some extent      (!) STRESS CONCERN      9/24/2024   Nutrition   Diet: Regular (no restrictions)            9/27/2023   Exercise   Frequency of exercise: 2-3 days/week            9/24/2024   Social Factors   Frequency of gathering with friends or relatives Once a week   Worry food won't last until get money to buy more No   Food not last or not have enough  money for food? No   Do you have housing? (Housing is defined as stable permanent housing and does not include staying ouside in a car, in a tent, in an abandoned building, in an overnight shelter, or couch-surfing.) Yes   Are you worried about losing your housing? No   Lack of transportation? No   Unable to get utilities (heat,electricity)? No            9/24/2024   Fall Risk   Fallen 2 or more times in the past year? No   Trouble with walking or balance? No             9/24/2024   Activities of Daily Living- Home Safety   Needs help with the following daily activites None of the above   Safety concerns in the home None of the above            9/24/2024   Dental   Dentist two times every year? Yes            9/24/2024   Hearing Screening   Hearing concerns? (!) IT'S HARD TO FOLLOW A CONVERSATION IN A NOISY RESTAURANT OR CROWDED ROOM.            9/24/2024   Driving Risk Screening   Patient/family members have concerns about driving No            9/24/2024   General Alertness/Fatigue Screening   Have you been more tired than usual lately? No            9/24/2024   Urinary Incontinence Screening   Bothered by leaking urine in past 6 months No            9/24/2024   TB Screening   Were you born outside of the [place]? No            Today's PHQ-2 Score:       9/24/2024    10:01 AM   PHQ-2 ( 1999 [employer])   Q1: Little interest or pleasure in doing things 0   Q2: Feeling down, depressed or hopeless 0   PHQ-2 Score 0   Q1: Little interest or pleasure in doing things Not at all   Q2: Feeling down, depressed or hopeless Not at all   PHQ-2 Score 0           9/24/2024   Substance Use   Alcohol more than 3/day or more than 7/wk No   Do you have a current opioid prescription? No   How severe/bad is pain from 1 to 10? 2/10   Do you use any other substances recreationally? No        Social History     Tobacco Use    Smoking status: Former     Current packs/day: 0.00     Types: Cigarettes     Quit date: 1/1/1980     Years since quitting:  "44.7     Passive exposure: Past    Smokeless tobacco: Never   Vaping Use    Vaping status: Never Used   Substance Use Topics    Alcohol use: No     Comment: Alcoholic Drinks/day: 2-3 beer per month    Drug use: No             Reviewed and updated as needed this visit by Provider                      Current providers sharing in care for this patient include:  Patient Care Team:  Howard Luu PA-C as PCP - General (Family Medicine)  Alesia Hammond MD as MD (Hematology & Oncology)  Alesia Hammond MD as Assigned Cancer Care Provider  Howard Luu PA-C as Assigned PCP  Perez Carlos DO as Assigned Heart and Vascular Provider    The following health maintenance items are reviewed in Epic and correct as of today:  Health Maintenance   Topic Date Due    COPD ACTION PLAN  Never done    RSV VACCINE (1 - 1-dose 75+ series) Never done    LIPID  03/12/2025    MICROALBUMIN  03/12/2025    BMP  05/28/2025    MEDICARE ANNUAL WELLNESS VISIT  09/24/2025    ANNUAL REVIEW OF HM ORDERS  09/24/2025    FALL RISK ASSESSMENT  09/24/2025    HEMOGLOBIN  09/24/2025    DTAP/TDAP/TD IMMUNIZATION (4 - Td or Tdap) 06/14/2027    ADVANCE CARE PLANNING  09/24/2029    SPIROMETRY  Completed    PHQ-2 (once per calendar year)  Completed    INFLUENZA VACCINE  Completed    Pneumococcal Vaccine: 65+ Years  Completed    URINALYSIS  Completed    ZOSTER IMMUNIZATION  Completed    COVID-19 Vaccine  Completed    HPV IMMUNIZATION  Aged Out    MENINGITIS IMMUNIZATION  Aged Out    RSV MONOCLONAL ANTIBODY  Aged Out          Objective    Exam  /58 (BP Location: Left arm, Patient Position: Sitting, Cuff Size: Adult Regular)   Pulse 72   Temp 98.3  F (36.8  C) (Oral)   Resp 14   Ht 1.791 m (5' 10.5\")   Wt 78.5 kg (173 lb)   SpO2 95%   BMI 24.47 kg/m     Estimated body mass index is 24.47 kg/m  as calculated from the following:    Height as of this encounter: 1.791 m (5' 10.5\").    Weight as of this encounter: 78.5 kg (173 " lb).    Physical Exam  Vitals and nursing note reviewed.   Constitutional:       General: He is not in acute distress.     Appearance: Normal appearance. He is well-developed and well-groomed. He is not ill-appearing or toxic-appearing.   HENT:      Head: Normocephalic and atraumatic.      Right Ear: Tympanic membrane, ear canal and external ear normal.      Left Ear: Tympanic membrane, ear canal and external ear normal.      Mouth/Throat:      Lips: Pink.      Mouth: Mucous membranes are moist.      Palate: No mass.      Pharynx: Oropharynx is clear. Uvula midline.      Tonsils: No tonsillar exudate or tonsillar abscesses.   Eyes:      General: Lids are normal.         Right eye: No discharge.         Left eye: No discharge.   Neck:      Trachea: Trachea normal.   Cardiovascular:      Rate and Rhythm: Normal rate and regular rhythm.      Heart sounds: S1 normal and S2 normal. No murmur heard.  Pulmonary:      Effort: Pulmonary effort is normal.      Breath sounds: Normal breath sounds and air entry.   Abdominal:      General: Bowel sounds are normal. There is no distension.      Palpations: Abdomen is soft.      Tenderness: There is no abdominal tenderness. There is no guarding or rebound.   Musculoskeletal:      Cervical back: Full passive range of motion without pain and neck supple.      Right lower leg: No edema.      Left lower leg: No edema.   Lymphadenopathy:      Cervical: No cervical adenopathy.   Skin:     General: Skin is warm and dry.      Findings: No lesion or rash.   Neurological:      General: No focal deficit present.      Mental Status: He is alert.      Cranial Nerves: No cranial nerve deficit.      Gait: Gait is intact.      Deep Tendon Reflexes:      Reflex Scores:       Patellar reflexes are 2+ on the right side and 2+ on the left side.  Psychiatric:         Attention and Perception: Attention normal.         Mood and Affect: Mood normal.         Speech: Speech normal.             9/24/2024    Mini Cog   Clock Draw Score 2 Normal   3 Item Recall 2 objects recalled   Mini Cog Total Score 4                 Signed Electronically by: Howard Luu PA-C

## 2024-09-24 NOTE — LETTER
September 25, 2024      Karen V Pamela  [address]  [facility] [id number]        Dear ,    We are writing to inform you of your test results.    Complete Blood Count: White blood cell, red blood cell, platelets, and hemoglobin are stable.   Metabolic panel : Kidney function, liver function, electrolytes are stable.    Lipids: Lipid panel is stable but elevated. Work on diet and exercise to help improve this.   Vit D is within normal limits   B12 is within normal limits     Resulted Orders   CBC with platelets   Result Value Ref Range    WBC Count 4.3 4.0 - 11.0 10e3/uL    RBC Count 4.01 (L) 4.40 - 5.90 10e6/uL    Hemoglobin 12.5 (L) 13.3 - 17.7 g/dL    Hematocrit 38.6 (L) 40.0 - 53.0 %    MCV 96 78 - 100 fL    MCH 31.2 26.5 - 33.0 pg    MCHC 32.4 31.5 - 36.5 g/dL    RDW 14.9 10.0 - 15.0 %    Platelet Count 180 150 - 450 10e3/uL   Comprehensive metabolic panel (BMP + Alb, Alk Phos, ALT, AST, Total. Bili, TP)   Result Value Ref Range    Sodium 138 135 - 145 mmol/L    Potassium 4.7 3.4 - 5.3 mmol/L    Carbon Dioxide (CO2) 25 22 - 29 mmol/L    Anion Gap 11 7 - 15 mmol/L    Urea Nitrogen 20.9 8.0 - 23.0 mg/dL    Creatinine 0.80 0.67 - 1.17 mg/dL    GFR Estimate 85 >60 mL/min/1.73m2      Comment:      eGFR calculated using 2021 CKD-EPI equation.    Calcium 9.1 8.8 - 10.4 mg/dL      Comment:      Reference intervals for this test were updated on 7/16/2024 to reflect our healthy population more accurately. There may be differences in the flagging of prior results with similar values performed with this method. Those prior results can be interpreted in the context of the updated reference intervals.    Chloride 102 98 - 107 mmol/L    Glucose 109 (H) 70 - 99 mg/dL    Alkaline Phosphatase 55 40 - 150 U/L    AST 23 0 - 45 U/L    ALT 14 0 - 70 U/L    Protein Total 6.6 6.4 - 8.3 g/dL    Albumin 4.2 3.5 - 5.2 g/dL    Bilirubin Total 0.4 <=1.2 mg/dL    Patient Fasting > 8hrs? Yes    Lipid panel reflex to  direct LDL Fasting   Result Value Ref Range    Cholesterol 193 <200 mg/dL    Triglycerides 85 <150 mg/dL    Direct Measure HDL 54 >=40 mg/dL    LDL Cholesterol Calculated 122 (H) <100 mg/dL    Non HDL Cholesterol 139 (H) <130 mg/dL    Patient Fasting > 8hrs? Yes     Narrative    Cholesterol  Desirable: < 200 mg/dL  Borderline High: 200 - 239 mg/dL  High: >= 240 mg/dL    Triglycerides  Normal: < 150 mg/dL  Borderline High: 150 - 199 mg/dL  High: 200-499 mg/dL  Very High: >= 500 mg/dL    Direct Measure HDL  Female: >= 50 mg/dL   Male: >= 40 mg/dL    LDL Cholesterol  Desirable: < 100 mg/dL  Above Desirable: 100 - 129 mg/dL   Borderline High: 130 - 159 mg/dL   High:  160 - 189 mg/dL   Very High: >= 190 mg/dL    Non HDL Cholesterol  Desirable: < 130 mg/dL  Above Desirable: 130 - 159 mg/dL  Borderline High: 160 - 189 mg/dL  High: 190 - 219 mg/dL  Very High: >= 220 mg/dL   Vitamin D Deficiency   Result Value Ref Range    Vitamin D, Total (25-Hydroxy) 40 20 - 50 ng/mL      Comment:      optimum levels    Narrative    Season, race, dietary intake, and treatment affect the concentration of 25-hydroxy-Vitamin D. Values may decrease during winter months and increase during summer months.    Vitamin D determination is routinely performed by an immunoassay specific for 25 hydroxyvitamin D3.  If an individual is on vitamin D2(ergocalciferol) supplementation, please specify 25 OH vitamin D2 and D3 level determination by LCMSMS test VITD23.     Vitamin B12   Result Value Ref Range    Vitamin B12 663 232 - 1,245 pg/mL       If you have any questions or concerns, please call the clinic at the number listed above.       Sincerely,      Howard Luu PA-C

## 2024-11-03 ENCOUNTER — HOSPITAL ENCOUNTER (OUTPATIENT)
Facility: CLINIC | Age: 89
Setting detail: OBSERVATION
Discharge: HOME | End: 2024-11-04
Attending: EMERGENCY MEDICINE | Admitting: INTERNAL MEDICINE
Payer: COMMERCIAL

## 2024-11-03 ENCOUNTER — APPOINTMENT (OUTPATIENT)
Dept: CT IMAGING | Facility: CLINIC | Age: 89
End: 2024-11-03
Attending: EMERGENCY MEDICINE
Payer: COMMERCIAL

## 2024-11-03 DIAGNOSIS — J44.1 COPD EXACERBATION (H): Primary | ICD-10-CM

## 2024-11-03 DIAGNOSIS — J96.01 ACUTE HYPOXEMIC RESPIRATORY FAILURE (H): ICD-10-CM

## 2024-11-03 LAB
ANION GAP SERPL CALCULATED.3IONS-SCNC: 13 MMOL/L (ref 7–15)
ATRIAL RATE - MUSE: 73 BPM
BASOPHILS # BLD AUTO: 0 10E3/UL (ref 0–0.2)
BASOPHILS NFR BLD AUTO: 0 %
BUN SERPL-MCNC: 22 MG/DL (ref 8–23)
CALCIUM SERPL-MCNC: 8.7 MG/DL (ref 8.8–10.4)
CHLORIDE SERPL-SCNC: 104 MMOL/L (ref 98–107)
CREAT SERPL-MCNC: 0.85 MG/DL (ref 0.67–1.17)
DIASTOLIC BLOOD PRESSURE - MUSE: NORMAL MMHG
EGFRCR SERPLBLD CKD-EPI 2021: 83 ML/MIN/1.73M2
EOSINOPHIL # BLD AUTO: 0 10E3/UL (ref 0–0.7)
EOSINOPHIL NFR BLD AUTO: 1 %
ERYTHROCYTE [DISTWIDTH] IN BLOOD BY AUTOMATED COUNT: 14.6 % (ref 10–15)
FLUAV RNA SPEC QL NAA+PROBE: NEGATIVE
FLUBV RNA RESP QL NAA+PROBE: NEGATIVE
GLUCOSE SERPL-MCNC: 114 MG/DL (ref 70–99)
HCO3 SERPL-SCNC: 22 MMOL/L (ref 22–29)
HCT VFR BLD AUTO: 35.3 % (ref 40–53)
HGB BLD-MCNC: 11.7 G/DL (ref 13.3–17.7)
IMM GRANULOCYTES # BLD: 0 10E3/UL
IMM GRANULOCYTES NFR BLD: 0 %
INTERPRETATION ECG - MUSE: NORMAL
LYMPHOCYTES # BLD AUTO: 0.8 10E3/UL (ref 0.8–5.3)
LYMPHOCYTES NFR BLD AUTO: 11 %
MCH RBC QN AUTO: 31 PG (ref 26.5–33)
MCHC RBC AUTO-ENTMCNC: 33.1 G/DL (ref 31.5–36.5)
MCV RBC AUTO: 94 FL (ref 78–100)
MONOCYTES # BLD AUTO: 0.5 10E3/UL (ref 0–1.3)
MONOCYTES NFR BLD AUTO: 7 %
NEUTROPHILS # BLD AUTO: 6 10E3/UL (ref 1.6–8.3)
NEUTROPHILS NFR BLD AUTO: 81 %
NRBC # BLD AUTO: 0 10E3/UL
NRBC BLD AUTO-RTO: 0 /100
NT-PROBNP SERPL-MCNC: 243 PG/ML (ref 0–1800)
P AXIS - MUSE: 71 DEGREES
PLATELET # BLD AUTO: 203 10E3/UL (ref 150–450)
POTASSIUM SERPL-SCNC: 4.4 MMOL/L (ref 3.4–5.3)
PR INTERVAL - MUSE: 170 MS
PROCALCITONIN SERPL IA-MCNC: 0.08 NG/ML
QRS DURATION - MUSE: 104 MS
QT - MUSE: 432 MS
QTC - MUSE: 475 MS
R AXIS - MUSE: 70 DEGREES
RBC # BLD AUTO: 3.77 10E6/UL (ref 4.4–5.9)
RSV RNA SPEC NAA+PROBE: NEGATIVE
SARS-COV-2 RNA RESP QL NAA+PROBE: NEGATIVE
SODIUM SERPL-SCNC: 139 MMOL/L (ref 135–145)
SYSTOLIC BLOOD PRESSURE - MUSE: NORMAL MMHG
T AXIS - MUSE: 82 DEGREES
TROPONIN T SERPL HS-MCNC: 19 NG/L
TROPONIN T SERPL HS-MCNC: 20 NG/L
VENTRICULAR RATE- MUSE: 73 BPM
WBC # BLD AUTO: 7.4 10E3/UL (ref 4–11)

## 2024-11-03 PROCEDURE — 250N000009 HC RX 250: Performed by: EMERGENCY MEDICINE

## 2024-11-03 PROCEDURE — 250N000013 HC RX MED GY IP 250 OP 250 PS 637: Performed by: INTERNAL MEDICINE

## 2024-11-03 PROCEDURE — 94640 AIRWAY INHALATION TREATMENT: CPT

## 2024-11-03 PROCEDURE — 250N000011 HC RX IP 250 OP 636: Performed by: EMERGENCY MEDICINE

## 2024-11-03 PROCEDURE — 96374 THER/PROPH/DIAG INJ IV PUSH: CPT | Mod: XU

## 2024-11-03 PROCEDURE — 999N000157 HC STATISTIC RCP TIME EA 10 MIN

## 2024-11-03 PROCEDURE — 80048 BASIC METABOLIC PNL TOTAL CA: CPT | Performed by: EMERGENCY MEDICINE

## 2024-11-03 PROCEDURE — 84145 PROCALCITONIN (PCT): CPT | Performed by: INTERNAL MEDICINE

## 2024-11-03 PROCEDURE — 96375 TX/PRO/DX INJ NEW DRUG ADDON: CPT

## 2024-11-03 PROCEDURE — 99291 CRITICAL CARE FIRST HOUR: CPT | Performed by: INTERNAL MEDICINE

## 2024-11-03 PROCEDURE — 93005 ELECTROCARDIOGRAM TRACING: CPT | Performed by: EMERGENCY MEDICINE

## 2024-11-03 PROCEDURE — 99291 CRITICAL CARE FIRST HOUR: CPT | Mod: 25

## 2024-11-03 PROCEDURE — 85004 AUTOMATED DIFF WBC COUNT: CPT | Performed by: EMERGENCY MEDICINE

## 2024-11-03 PROCEDURE — 84484 ASSAY OF TROPONIN QUANT: CPT | Performed by: EMERGENCY MEDICINE

## 2024-11-03 PROCEDURE — 87637 SARSCOV2&INF A&B&RSV AMP PRB: CPT | Performed by: EMERGENCY MEDICINE

## 2024-11-03 PROCEDURE — 36415 COLL VENOUS BLD VENIPUNCTURE: CPT | Performed by: EMERGENCY MEDICINE

## 2024-11-03 PROCEDURE — 120N000001 HC R&B MED SURG/OB

## 2024-11-03 PROCEDURE — 71275 CT ANGIOGRAPHY CHEST: CPT

## 2024-11-03 PROCEDURE — 83880 ASSAY OF NATRIURETIC PEPTIDE: CPT | Performed by: EMERGENCY MEDICINE

## 2024-11-03 RX ORDER — ONDANSETRON 4 MG/1
4 TABLET, ORALLY DISINTEGRATING ORAL EVERY 6 HOURS PRN
Status: DISCONTINUED | OUTPATIENT
Start: 2024-11-03 | End: 2024-11-04 | Stop reason: HOSPADM

## 2024-11-03 RX ORDER — AMOXICILLIN 250 MG
2 CAPSULE ORAL 2 TIMES DAILY PRN
Status: DISCONTINUED | OUTPATIENT
Start: 2024-11-03 | End: 2024-11-04 | Stop reason: HOSPADM

## 2024-11-03 RX ORDER — AMLODIPINE BESYLATE 10 MG/1
10 TABLET ORAL DAILY
Status: DISCONTINUED | OUTPATIENT
Start: 2024-11-04 | End: 2024-11-04 | Stop reason: HOSPADM

## 2024-11-03 RX ORDER — ACETAMINOPHEN 650 MG/1
650 SUPPOSITORY RECTAL EVERY 4 HOURS PRN
Status: DISCONTINUED | OUTPATIENT
Start: 2024-11-03 | End: 2024-11-04 | Stop reason: HOSPADM

## 2024-11-03 RX ORDER — AZITHROMYCIN 250 MG/1
250 TABLET, FILM COATED ORAL DAILY
Status: DISCONTINUED | OUTPATIENT
Start: 2024-11-04 | End: 2024-11-04 | Stop reason: HOSPADM

## 2024-11-03 RX ORDER — IOPAMIDOL 755 MG/ML
90 INJECTION, SOLUTION INTRAVASCULAR ONCE
Status: COMPLETED | OUTPATIENT
Start: 2024-11-03 | End: 2024-11-03

## 2024-11-03 RX ORDER — ACETAMINOPHEN 325 MG/1
650 TABLET ORAL EVERY 4 HOURS PRN
Status: DISCONTINUED | OUTPATIENT
Start: 2024-11-03 | End: 2024-11-04 | Stop reason: HOSPADM

## 2024-11-03 RX ORDER — PROCHLORPERAZINE 25 MG
12.5 SUPPOSITORY, RECTAL RECTAL EVERY 12 HOURS PRN
Status: DISCONTINUED | OUTPATIENT
Start: 2024-11-03 | End: 2024-11-04 | Stop reason: HOSPADM

## 2024-11-03 RX ORDER — PROCHLORPERAZINE MALEATE 5 MG/1
5 TABLET ORAL EVERY 6 HOURS PRN
Status: DISCONTINUED | OUTPATIENT
Start: 2024-11-03 | End: 2024-11-04 | Stop reason: HOSPADM

## 2024-11-03 RX ORDER — GUAIFENESIN/DEXTROMETHORPHAN 100-10MG/5
10 SYRUP ORAL EVERY 4 HOURS PRN
Status: DISCONTINUED | OUTPATIENT
Start: 2024-11-03 | End: 2024-11-04 | Stop reason: HOSPADM

## 2024-11-03 RX ORDER — LOSARTAN POTASSIUM 50 MG/1
100 TABLET ORAL DAILY
Status: DISCONTINUED | OUTPATIENT
Start: 2024-11-04 | End: 2024-11-04 | Stop reason: HOSPADM

## 2024-11-03 RX ORDER — CEFTRIAXONE 1 G/1
1 INJECTION, POWDER, FOR SOLUTION INTRAMUSCULAR; INTRAVENOUS ONCE
Status: COMPLETED | OUTPATIENT
Start: 2024-11-03 | End: 2024-11-03

## 2024-11-03 RX ORDER — IPRATROPIUM BROMIDE AND ALBUTEROL SULFATE 2.5; .5 MG/3ML; MG/3ML
3 SOLUTION RESPIRATORY (INHALATION)
Status: DISCONTINUED | OUTPATIENT
Start: 2024-11-04 | End: 2024-11-04 | Stop reason: HOSPADM

## 2024-11-03 RX ORDER — LIDOCAINE 40 MG/G
CREAM TOPICAL
Status: DISCONTINUED | OUTPATIENT
Start: 2024-11-03 | End: 2024-11-04 | Stop reason: HOSPADM

## 2024-11-03 RX ORDER — ONDANSETRON 2 MG/ML
4 INJECTION INTRAMUSCULAR; INTRAVENOUS EVERY 6 HOURS PRN
Status: DISCONTINUED | OUTPATIENT
Start: 2024-11-03 | End: 2024-11-04 | Stop reason: HOSPADM

## 2024-11-03 RX ORDER — PREDNISONE 20 MG/1
40 TABLET ORAL DAILY
Status: DISCONTINUED | OUTPATIENT
Start: 2024-11-04 | End: 2024-11-04 | Stop reason: HOSPADM

## 2024-11-03 RX ORDER — METHYLPREDNISOLONE SODIUM SUCCINATE 125 MG/2ML
125 INJECTION INTRAMUSCULAR; INTRAVENOUS ONCE
Status: COMPLETED | OUTPATIENT
Start: 2024-11-03 | End: 2024-11-03

## 2024-11-03 RX ORDER — AZITHROMYCIN 500 MG/1
500 TABLET, FILM COATED ORAL ONCE
Status: COMPLETED | OUTPATIENT
Start: 2024-11-03 | End: 2024-11-03

## 2024-11-03 RX ORDER — CALCIUM CARBONATE 500 MG/1
1000 TABLET, CHEWABLE ORAL 4 TIMES DAILY PRN
Status: DISCONTINUED | OUTPATIENT
Start: 2024-11-03 | End: 2024-11-04 | Stop reason: HOSPADM

## 2024-11-03 RX ORDER — GUAIFENESIN 200 MG/10ML
200 LIQUID ORAL EVERY 4 HOURS PRN
Status: DISCONTINUED | OUTPATIENT
Start: 2024-11-03 | End: 2024-11-04 | Stop reason: HOSPADM

## 2024-11-03 RX ORDER — IPRATROPIUM BROMIDE AND ALBUTEROL SULFATE 2.5; .5 MG/3ML; MG/3ML
3 SOLUTION RESPIRATORY (INHALATION) ONCE
Status: COMPLETED | OUTPATIENT
Start: 2024-11-03 | End: 2024-11-03

## 2024-11-03 RX ORDER — ALBUTEROL SULFATE 0.83 MG/ML
2.5 SOLUTION RESPIRATORY (INHALATION)
Status: DISCONTINUED | OUTPATIENT
Start: 2024-11-03 | End: 2024-11-04 | Stop reason: HOSPADM

## 2024-11-03 RX ORDER — AMOXICILLIN 250 MG
1 CAPSULE ORAL 2 TIMES DAILY PRN
Status: DISCONTINUED | OUTPATIENT
Start: 2024-11-03 | End: 2024-11-04 | Stop reason: HOSPADM

## 2024-11-03 RX ADMIN — IPRATROPIUM BROMIDE AND ALBUTEROL SULFATE 3 ML: .5; 3 SOLUTION RESPIRATORY (INHALATION) at 20:10

## 2024-11-03 RX ADMIN — CEFTRIAXONE 1 G: 1 INJECTION, POWDER, FOR SOLUTION INTRAMUSCULAR; INTRAVENOUS at 20:15

## 2024-11-03 RX ADMIN — AZITHROMYCIN DIHYDRATE 500 MG: 500 TABLET, FILM COATED ORAL at 20:36

## 2024-11-03 RX ADMIN — IOPAMIDOL 90 ML: 755 INJECTION, SOLUTION INTRAVENOUS at 18:17

## 2024-11-03 RX ADMIN — FLUOXETINE HYDROCHLORIDE 20 MG: 20 CAPSULE ORAL at 22:29

## 2024-11-03 RX ADMIN — METHYLPREDNISOLONE SODIUM SUCCINATE 125 MG: 125 INJECTION, POWDER, FOR SOLUTION INTRAMUSCULAR; INTRAVENOUS at 20:15

## 2024-11-03 ASSESSMENT — ACTIVITIES OF DAILY LIVING (ADL)
ADLS_ACUITY_SCORE: 0

## 2024-11-03 ASSESSMENT — COLUMBIA-SUICIDE SEVERITY RATING SCALE - C-SSRS
2. HAVE YOU ACTUALLY HAD ANY THOUGHTS OF KILLING YOURSELF IN THE PAST MONTH?: NO
1. IN THE PAST MONTH, HAVE YOU WISHED YOU WERE DEAD OR WISHED YOU COULD GO TO SLEEP AND NOT WAKE UP?: NO
6. HAVE YOU EVER DONE ANYTHING, STARTED TO DO ANYTHING, OR PREPARED TO DO ANYTHING TO END YOUR LIFE?: NO

## 2024-11-03 NOTE — ED PROVIDER NOTES
EMERGENCY DEPARTMENT ENCOUNTER      NAME: Karen Santiago  AGE: 89 year old male  YOB: 1934  MRN: [id number]  EVALUATION DATE & TIME: 11/3/2024  5:05 PM    PCP: Howard Luu    ED PROVIDER: Maria A Vinson MD    Chief Complaint   Patient presents with    Shortness of Breath         FINAL IMPRESSION:  1. Acute hypoxemic respiratory failure (H)    2. COPD exacerbation (H)          ED COURSE & MEDICAL DECISION MAKING:    Pertinent Labs & Imaging studies reviewed. (See chart for details)  89 year old male with history of HTN, COPD, CKD who presents to the Emergency Department for evaluation of shortness of breath since yesterday worsened today.  Patient hypoxic to 85% on room air here requiring 2 L nasal cannula.  Differential includes viral syndrome, influenza, COVID-19, pneumonia, COPD exacerbation, pulmonary embolism, pulmonary edema, symptomatic anemia, arrhythmia, ACS.    Patient placed on monitor, IV established and blood obtained.  Placed on supplemental oxygen.  Twelve-lead EKG shows sinus rhythm rate 73.  CBC, BMP, BNP, troponin obtained and unremarkable.  COVID/influenza/RSV swab negative.  CT PE study obtained and unremarkable.  Covered with Solu-Medrol, azithromycin, Rocephin, nebs and with his hypoxia will be admitted for further management.      ED Course as of 11/03/24 2252   Sun Nov 03, 2024   1834 CT Chest Pulmonary Embolism w Contrast  CT independently interpreted by myself, no visualized PE.  Left lower lobe infiltrate   2007 Admitted to Dr. Matute        Medical Decision Making  Obtained supplemental history:Supplemental history obtained?: Family Member/Significant Other  Reviewed external records: External records reviewed?: Outpatient Record: 9/24/2024 office note  Care impacted by chronic illness:Chronic Lung Disease and Hypertension  Did you consider but not order tests?: Work up considered but not performed and documented in chart, if applicable  Did you interpret  images independently?: Independent interpretation of ECG and images noted in documentation, when applicable.  Consultation discussion with other provider:Did you involve another provider (consultant, , pharmacy, etc.)?: I discussed the care with another health care provider, see documentation for details.  Admit.    MIPS: CT Pulmonary Angiogram:CT PA was ordered for reason(s) other than PE.      At the conclusion of the encounter I discussed the results of all of the tests and the disposition. The questions were answered. The patient or family acknowledged understanding and was agreeable with the care plan.    CRITICAL CARE:  Critical Care  Performed by: Maria A Vinson MD  Authorized by: Maria A Vinson MD     Total critical care time: 42 minutes  Criticalcare time was exclusive of separately billable procedures and treating other patients.  Critical care was necessary to treat or prevent imminent or life-threatening deterioration of the following conditions: Acute hypoxic respiratory failure  Critical care was time spent personally by me on the following activities: development of treatment plan with patient or surrogate, discussions with consultants, examination of patient, evaluation of patient's response totreatment, obtaining history from patient or surrogate, ordering and performing treatments and interventions, ordering and review of laboratory studies, ordering and review of radiographic studies and re-evaluation ofpatient's condition, this excludes any separately billable procedures.      MEDICATIONS GIVEN IN THE EMERGENCY:  Medications   amLODIPine (NORVASC) tablet 10 mg (has no administration in time range)   losartan (COZAAR) tablet 100 mg (has no administration in time range)   FLUoxetine (PROzac) capsule 20 mg (20 mg Oral $Given 11/3/24 2581)   lidocaine 1 % 0.1-1 mL (has no administration in time range)   lidocaine (LMX4) cream (has no administration in time range)   sodium chloride (PF) 0.9% PF  flush 3 mL (3 mLs Intracatheter $Given 11/3/24 2105)   sodium chloride (PF) 0.9% PF flush 3 mL (has no administration in time range)   senna-docusate (SENOKOT-S/PERICOLACE) 8.6-50 MG per tablet 1 tablet (has no administration in time range)     Or   senna-docusate (SENOKOT-S/PERICOLACE) 8.6-50 MG per tablet 2 tablet (has no administration in time range)   calcium carbonate (TUMS) chewable tablet 1,000 mg (has no administration in time range)   ipratropium - albuterol 0.5 mg/2.5 mg/3 mL (DUONEB) neb solution 3 mL (has no administration in time range)   acetaminophen (TYLENOL) tablet 650 mg (has no administration in time range)     Or   acetaminophen (TYLENOL) Suppository 650 mg (has no administration in time range)   melatonin tablet 1 mg (has no administration in time range)   ondansetron (ZOFRAN ODT) ODT tab 4 mg (has no administration in time range)     Or   ondansetron (ZOFRAN) injection 4 mg (has no administration in time range)   prochlorperazine (COMPAZINE) injection 5 mg (has no administration in time range)     Or   prochlorperazine (COMPAZINE) tablet 5 mg (has no administration in time range)     Or   prochlorperazine (COMPAZINE) suppository 12.5 mg (has no administration in time range)   benzocaine-menthol (CEPACOL) 15-3.6 MG lozenge 1 lozenge (has no administration in time range)   guaiFENesin (ROBITUSSIN) 20 mg/mL solution 200 mg (has no administration in time range)   guaiFENesin-dextromethorphan (ROBITUSSIN DM) 100-10 MG/5ML syrup 10 mL (has no administration in time range)   predniSONE (DELTASONE) tablet 40 mg (has no administration in time range)   azithromycin (ZITHROMAX) tablet 250 mg (has no administration in time range)   albuterol (PROVENTIL) neb solution 2.5 mg (has no administration in time range)   iopamidol (ISOVUE-370) solution 90 mL (90 mLs Intravenous $Given 11/3/24 1817)   methylPREDNISolone Na Suc (solu-MEDROL) injection 125 mg (125 mg Intravenous $Given 11/3/24 2015)   ipratropium -  albuterol 0.5 mg/2.5 mg/3 mL (DUONEB) neb solution 3 mL (3 mLs Nebulization $Given 11/3/24 2010)   cefTRIAXone (ROCEPHIN) 1 g vial to attach to  mL bag for ADULTS or NS 50 mL bag for PEDS (1 g Intravenous $New Bag 11/3/24 2015)   azithromycin (ZITHROMAX) tablet 500 mg (500 mg Oral $Given 11/3/24 2036)       NEW PRESCRIPTIONS STARTED AT TODAY'S ER VISIT  Current Discharge Medication List             =================================================================    HPI    Patient information was obtained from: Patient, daughter    Use of Intrepreter: N/A      Karen Santiago is a 89 year old male with pertinent medical history of HTN, COPD, CKD who presents shortness of breath.  Has had a slight cough and mild shortness of breath since yesterday but this certainly worsened today around noon.  Tried home albuterol inhaler without much relief.  Tried nebulizer with brief relief but seem to be more short of breath.  No associated chest pain.  Patient states that typically when he gets a COPD exacerbation he will have a lot of cough and chest congestion and does not have that today which makes him concerned that this might be something else.  Patient denies any history of blood clots, bleeding disorders, or peripheral edema, known heart failure.      PAST MEDICAL HISTORY:  Past Medical History:   Diagnosis Date    Cancer (H)     bladder    COPD (chronic obstructive pulmonary disease) (H)     Hypertension     Rheumatoid arthritis (H)        PAST SURGICAL HISTORY:  Past Surgical History:   Procedure Laterality Date    ARTHROPLASTY KNEE Right 3/20/2024    Procedure: RIGHT TOTAL KNEE ARTHROPLASTY;  Surgeon: Kishor Mckeon MD;  Location: [facility] OR    HC REMOVAL TESTIS,SIMPLE      Description: Orchiectomy Left;  Recorded: 04/26/2010;  Comments: for benign interstitial tumor    ZZC CYSTECTOMY,ILEAL CONDUIT/SIGMOID BLADDER      Description: Complete Bladder Cystectomy With Ureteroileal Conduit;  Proc Date:  01/01/2002;  Comments: cystoprostatectomy for squamous cell carcinoma per [facility] REMV PROSTATE,RETROPUBIC,SUBTOTAL      Description: Prostatectomy, Retropubic;  Recorded: 05/19/2009;  Comments: 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 Taking?   FLUoxetine (PROZAC) 20 MG capsule 11/2/2024 Evening  No Yes   Sig: Take 1 capsule (20 mg) by mouth daily   acetaminophen (TYLENOL) 325 MG tablet 11/3/2024  No Yes   Sig: Take 2 tablets (650 mg) by mouth every 4 hours as needed for other (mild pain)   albuterol (PROAIR HFA/PROVENTIL HFA/VENTOLIN HFA) 108 (90 Base) MCG/ACT inhaler 11/3/2024  No Yes   Sig: INHALE TWO PUFFS BY MOUTH EVERY FOUR HOURS AS NEEDED FOR WHEEZING   amLODIPine (NORVASC) 10 MG tablet 11/3/2024 Morning  No Yes   Sig: TAKE ONE TABLET BY MOUTH ONE TIME DAILY   losartan (COZAAR) 100 MG tablet 11/3/2024 Morning  No Yes   Sig: TAKE ONE TABLET BY MOUTH ONE TIME DAILY   melatonin 3 MG tablet 11/2/2024 Bedtime  Yes Yes   Sig: Take 3 mg by mouth at bedtime.   mometasone-formoterol (DULERA) 100-5 MCG/ACT inhaler 11/3/2024 Morning  No Yes   Sig: Inhale 2 puffs into the lungs 2 times daily.   vitamin B-12 (CYANOCOBALAMIN) 100 MCG tablet 11/3/2024 Noon  Yes Yes   Sig: Take 100 mcg by mouth daily (with lunch).   vitamin D3 (CHOLECALCIFEROL) 50 mcg (2000 units) tablet 11/3/2024 Noon  Yes Yes   Sig: Take 2 tablets by mouth daily (with lunch).      Facility-Administered Medications: None       ALLERGIES:  No Known Allergies    FAMILY HISTORY:  No family history on file.    SOCIAL HISTORY:  Social History     Tobacco Use    Smoking status: Former     Current packs/day: 0.00     Types:  "Cigarettes     Quit date: 1980     Years since quittin.8     Passive exposure: Past    Smokeless tobacco: Never   Vaping Use    Vaping status: Never Used   Substance Use Topics    Alcohol use: No     Comment: Alcoholic Drinks/day: 2-3 beer per month    Drug use: No        VITALS:  Patient Vitals for the past 24 hrs:   BP Temp Temp src Pulse Resp SpO2 Height Weight   24 2153 (!) 164/77 98.3  F (36.8  C) Oral 78 19 93 % -- 78.9 kg (173 lb 14.4 oz)   24 2100 (!) 148/69 98.2  F (36.8  C) Oral -- -- -- -- --   24 139/65 -- -- 68 30 96 % -- --   24 -- -- -- 71 23 93 % -- --   24 193 -- -- -- 72 30 94 % -- --   24 1930 (!) 140/76 -- -- 70 18 94 % -- --   24 1840 -- -- -- 66 17 96 % -- --   24 1800 (!) 146/70 -- -- 64 16 97 % -- --   24 1745 (!) 145/72 -- -- 66 18 96 % -- --   24 1730 138/65 -- -- 66 16 95 % 1.803 m (5' 11\") 79.8 kg (176 lb)   24 1715 (!) 148/70 -- -- 69 20 93 % -- --   24 1710 (!) 155/71 -- -- 69 15 91 % -- --   24 1701 (!) 151/71 99.1  F (37.3  C) Temporal 77 20 (!) 85 % -- --       PHYSICAL EXAM    General Appearance: Chronically ill-appearing elderly male in no acute distress  Head:  Normocephalic  Eyes:  conjunctiva/corneas clear  ENT:   membranes are moist without pallor  Neck:  Supple  Cardio:  Regular rate and rhythm, no murmur/gallop/rub  Pulm:  No respiratory distress, breath sounds are decreased throughout, mild expiratory wheeze.  Hypoxic into the 80s on room air requiring 2 L nasal cannula  Extremities: Trace pedal edema at the sock line bilaterally  Skin:  Skin warm, dry, no rashes  Neuro:  Alert and oriented ×3     RADIOLOGY/LABS:  Reviewed all pertinent imaging. Please see official radiology report. All pertinent labs reviewed and interpreted.    Results for orders placed or performed during the hospital encounter of 24   CT Chest Pulmonary Embolism w Contrast    Impression    " IMPRESSION:  1.  No pulmonary emboli evident.  2.  Degree of central pulmonary arterial prominence has diminished.  3.  Resolution of prior small pleural effusions. Mild atelectasis and scarring persists. No new infiltrate.  4.  Tiny pulmonary nodules unchanged and will continue to be evaluated on subsequent exams   Basic metabolic panel   Result Value Ref Range    Sodium 139 135 - 145 mmol/L    Potassium 4.4 3.4 - 5.3 mmol/L    Chloride 104 98 - 107 mmol/L    Carbon Dioxide (CO2) 22 22 - 29 mmol/L    Anion Gap 13 7 - 15 mmol/L    Urea Nitrogen 22.0 8.0 - 23.0 mg/dL    Creatinine 0.85 0.67 - 1.17 mg/dL    GFR Estimate 83 >60 mL/min/1.73m2    Calcium 8.7 (L) 8.8 - 10.4 mg/dL    Glucose 114 (H) 70 - 99 mg/dL   Result Value Ref Range    Troponin T, High Sensitivity 19 <=22 ng/L   Nt probnp inpatient   Result Value Ref Range    N terminal Pro BNP Inpatient 243 0 - 1,800 pg/mL   Symptomatic Influenza A/B, RSV, & SARS-CoV2 PCR (COVID-19) Nasopharyngeal    Specimen: Nasopharyngeal; Swab   Result Value Ref Range    Influenza A PCR Negative Negative    Influenza B PCR Negative Negative    RSV PCR Negative Negative    SARS CoV2 PCR Negative Negative   CBC with platelets and differential   Result Value Ref Range    WBC Count 7.4 4.0 - 11.0 10e3/uL    RBC Count 3.77 (L) 4.40 - 5.90 10e6/uL    Hemoglobin 11.7 (L) 13.3 - 17.7 g/dL    Hematocrit 35.3 (L) 40.0 - 53.0 %    MCV 94 78 - 100 fL    MCH 31.0 26.5 - 33.0 pg    MCHC 33.1 31.5 - 36.5 g/dL    RDW 14.6 10.0 - 15.0 %    Platelet Count 203 150 - 450 10e3/uL    % Neutrophils 81 %    % Lymphocytes 11 %    % Monocytes 7 %    % Eosinophils 1 %    % Basophils 0 %    % Immature Granulocytes 0 %    NRBCs per 100 WBC 0 <1 /100    Absolute Neutrophils 6.0 1.6 - 8.3 10e3/uL    Absolute Lymphocytes 0.8 0.8 - 5.3 10e3/uL    Absolute Monocytes 0.5 0.0 - 1.3 10e3/uL    Absolute Eosinophils 0.0 0.0 - 0.7 10e3/uL    Absolute Basophils 0.0 0.0 - 0.2 10e3/uL    Absolute Immature Granulocytes 0.0  <=0.4 10e3/uL    Absolute NRBCs 0.0 10e3/uL   Result Value Ref Range    Troponin T, High Sensitivity 20 <=22 ng/L   Result Value Ref Range    Procalcitonin 0.08 <0.50 ng/mL   ECG 12-LEAD WITH MUSE ([facility])   Result Value Ref Range    Systolic Blood Pressure  mmHg    Diastolic Blood Pressure  mmHg    Ventricular Rate 73 BPM    Atrial Rate 73 BPM    WV Interval 170 ms    QRS Duration 104 ms     ms    QTc 475 ms    P Axis 71 degrees    R AXIS 70 degrees    T Axis 82 degrees    Interpretation ECG       Sinus rhythm  Normal ECG  When compared with ECG of 28-May-2024 06:34,  No significant change was found  Confirmed by SEE ED PROVIDER NOTE FOR, ECG INTERPRETATION ([id number]),  Sharon West ([id number]) on 11/3/2024 5:09:07 PM         EKG:  Sinus rhythm rate 73.  No notable ST or T wave abnormalities and normal intervals with QRS of 104 and QTc of 475.  Compared to previous from 5/28/2024 no significant interval change.  I have independently reviewed and interpreted the EKG(s) documented above.        Maria A Vinson MD  Emergency Medicine  [facility] EMERGENCY ROOM  [address] [facility] [id number] [phone]  Dept: [phone]     Maria A Vinson MD  11/03/24 6897

## 2024-11-03 NOTE — ED TRIAGE NOTES
The patient presents to the ED with daughter with c/o shortness of breath with exertion since about 1200 today. Hx of COPD and symptoms are typically managed with inhalers and nebs. He is pretty active at baseline. SpO2 on R/A 85%. Fingers dusky. O2 per NC placed at 4L/min. Patient reports feeling instantly better. EKG done in triage.     Triage Assessment (Adult)       Row Name 11/03/24 4249          Triage Assessment    Airway WDL WDL        Respiratory WDL    Respiratory WDL X        Skin Circulation/Temperature WDL    Skin Circulation/Temperature WDL X;circulation     Skin Circulation pallor        Cardiac WDL    Cardiac WDL WDL        Peripheral/Neurovascular WDL    Peripheral Neurovascular WDL WDL        Cognitive/Neuro/Behavioral WDL    Cognitive/Neuro/Behavioral WDL WDL

## 2024-11-04 ENCOUNTER — APPOINTMENT (OUTPATIENT)
Dept: OCCUPATIONAL THERAPY | Facility: CLINIC | Age: 89
End: 2024-11-04
Attending: INTERNAL MEDICINE
Payer: COMMERCIAL

## 2024-11-04 VITALS
SYSTOLIC BLOOD PRESSURE: 151 MMHG | HEART RATE: 87 BPM | OXYGEN SATURATION: 96 % | DIASTOLIC BLOOD PRESSURE: 70 MMHG | BODY MASS INDEX: 24.35 KG/M2 | RESPIRATION RATE: 17 BRPM | HEIGHT: 71 IN | TEMPERATURE: 98.3 F | WEIGHT: 173.9 LBS

## 2024-11-04 LAB
ANION GAP SERPL CALCULATED.3IONS-SCNC: 13 MMOL/L (ref 7–15)
BUN SERPL-MCNC: 20.8 MG/DL (ref 8–23)
CALCIUM SERPL-MCNC: 9.2 MG/DL (ref 8.8–10.4)
CHLORIDE SERPL-SCNC: 103 MMOL/L (ref 98–107)
CREAT SERPL-MCNC: 0.76 MG/DL (ref 0.67–1.17)
EGFRCR SERPLBLD CKD-EPI 2021: 85 ML/MIN/1.73M2
ERYTHROCYTE [DISTWIDTH] IN BLOOD BY AUTOMATED COUNT: 14.4 % (ref 10–15)
GLUCOSE SERPL-MCNC: 177 MG/DL (ref 70–99)
HCO3 SERPL-SCNC: 20 MMOL/L (ref 22–29)
HCT VFR BLD AUTO: 37 % (ref 40–53)
HGB BLD-MCNC: 12.2 G/DL (ref 13.3–17.7)
MCH RBC QN AUTO: 31 PG (ref 26.5–33)
MCHC RBC AUTO-ENTMCNC: 33 G/DL (ref 31.5–36.5)
MCV RBC AUTO: 94 FL (ref 78–100)
PLATELET # BLD AUTO: 192 10E3/UL (ref 150–450)
POTASSIUM SERPL-SCNC: 4.5 MMOL/L (ref 3.4–5.3)
RBC # BLD AUTO: 3.94 10E6/UL (ref 4.4–5.9)
SODIUM SERPL-SCNC: 136 MMOL/L (ref 135–145)
WBC # BLD AUTO: 8.3 10E3/UL (ref 4–11)

## 2024-11-04 PROCEDURE — 85027 COMPLETE CBC AUTOMATED: CPT | Performed by: INTERNAL MEDICINE

## 2024-11-04 PROCEDURE — 250N000012 HC RX MED GY IP 250 OP 636 PS 637: Performed by: INTERNAL MEDICINE

## 2024-11-04 PROCEDURE — 36415 COLL VENOUS BLD VENIPUNCTURE: CPT | Performed by: INTERNAL MEDICINE

## 2024-11-04 PROCEDURE — 250N000009 HC RX 250: Performed by: INTERNAL MEDICINE

## 2024-11-04 PROCEDURE — 97530 THERAPEUTIC ACTIVITIES: CPT | Mod: GO | Performed by: OCCUPATIONAL THERAPIST

## 2024-11-04 PROCEDURE — 99239 HOSP IP/OBS DSCHRG MGMT >30: CPT | Performed by: INTERNAL MEDICINE

## 2024-11-04 PROCEDURE — 94640 AIRWAY INHALATION TREATMENT: CPT

## 2024-11-04 PROCEDURE — 97165 OT EVAL LOW COMPLEX 30 MIN: CPT | Mod: GO | Performed by: OCCUPATIONAL THERAPIST

## 2024-11-04 PROCEDURE — 80048 BASIC METABOLIC PNL TOTAL CA: CPT | Performed by: INTERNAL MEDICINE

## 2024-11-04 PROCEDURE — 999N000157 HC STATISTIC RCP TIME EA 10 MIN

## 2024-11-04 PROCEDURE — 999N000147 HC STATISTIC PT IP EVAL DEFER

## 2024-11-04 PROCEDURE — 250N000013 HC RX MED GY IP 250 OP 250 PS 637: Performed by: INTERNAL MEDICINE

## 2024-11-04 RX ORDER — PREDNISONE 20 MG/1
40 TABLET ORAL DAILY
Qty: 8 TABLET | Refills: 0 | Status: SHIPPED | OUTPATIENT
Start: 2024-11-05 | End: 2024-11-09

## 2024-11-04 RX ORDER — AZITHROMYCIN 250 MG/1
250 TABLET, FILM COATED ORAL DAILY
Qty: 3 TABLET | Refills: 0 | Status: SHIPPED | OUTPATIENT
Start: 2024-11-04 | End: 2024-11-07

## 2024-11-04 RX ADMIN — PREDNISONE 40 MG: 20 TABLET ORAL at 09:42

## 2024-11-04 RX ADMIN — IPRATROPIUM BROMIDE AND ALBUTEROL SULFATE 3 ML: .5; 3 SOLUTION RESPIRATORY (INHALATION) at 07:37

## 2024-11-04 RX ADMIN — LOSARTAN POTASSIUM 100 MG: 50 TABLET, FILM COATED ORAL at 09:42

## 2024-11-04 RX ADMIN — AMLODIPINE BESYLATE 10 MG: 10 TABLET ORAL at 09:42

## 2024-11-04 ASSESSMENT — ACTIVITIES OF DAILY LIVING (ADL)
ADLS_ACUITY_SCORE: 0
PREVIOUS_RESPONSIBILITIES: MEAL PREP;HOUSEKEEPING;LAUNDRY;SHOPPING;YARDWORK;FINANCES;DRIVING
ADLS_ACUITY_SCORE: 0

## 2024-11-04 NOTE — PROGRESS NOTES
Pt was given a Duoneb per MD's order. At the time of assessment pt was on 2L NC with SAT in low 90's. BS diminished. RT will continue to monitor pt.

## 2024-11-04 NOTE — PROGRESS NOTES
PRIMARY DIAGNOSIS: COPD Exacerbation  OUTPATIENT/OBSERVATION GOALS TO BE MET BEFORE DISCHARGE:  ADLs back to baseline: Yes    Activity and level of assistance: Ambulating independently.    Pain status: Pain free.    Return to near baseline physical activity: Yes     Discharge Planner Nurse   Safe discharge environment identified: Yes  Barriers to discharge: No       Entered by: Jaleel Sage RN 11/04/2024 12:41 PM     Please review provider order for any additional goals.   Nurse to notify provider when observation goals have been met and patient is ready for discharge.

## 2024-11-04 NOTE — PROGRESS NOTES
Patient has been assessed for Home Oxygen needs. Oxygen readings:    *Pulse oximetry (SpO2) = 96% on room air at rest while awake.    *SpO2 improved to NA% on NA liters/minute at rest.    *SpO2 = 91% on room air during activity/with exercise.    *SpO2 improved to NA % on NA liters/minute during activity/with exercise.     Jaleel Sage RN, ONC

## 2024-11-04 NOTE — PHARMACY-ADMISSION MEDICATION HISTORY
Pharmacist Admission Medication History    Admission medication history is complete. The information provided in this note is only as accurate as the sources available at the time of the update.    Information Source(s): Patient and CareEverywhere/SureScripts via in-person    Pertinent Information:     Changes made to PTA medication list:  Added: melatonin  Deleted: albuterol nebs, multivitamin, b complex  Changed: None    Medications currently not available for use during hospital stay. Family/Patient representative states they will bring dulera/ albuterol to [facility].     Allergies reviewed with patient and updates made in EHR: yes    Medication History Completed By: Valerie Garcia RPH 11/3/2024 7:04 PM    PTA Med List   Medication Sig Last Dose/Taking    acetaminophen (TYLENOL) 325 MG tablet Take 2 tablets (650 mg) by mouth every 4 hours as needed for other (mild pain) 11/3/2024    albuterol (PROAIR HFA/PROVENTIL HFA/VENTOLIN HFA) 108 (90 Base) MCG/ACT inhaler INHALE TWO PUFFS BY MOUTH EVERY FOUR HOURS AS NEEDED FOR WHEEZING 11/3/2024    amLODIPine (NORVASC) 10 MG tablet TAKE ONE TABLET BY MOUTH ONE TIME DAILY 11/3/2024 Morning    FLUoxetine (PROZAC) 20 MG capsule Take 1 capsule (20 mg) by mouth daily 11/2/2024 Evening    losartan (COZAAR) 100 MG tablet TAKE ONE TABLET BY MOUTH ONE TIME DAILY 11/3/2024 Morning    melatonin 3 MG tablet Take 3 mg by mouth at bedtime. 11/2/2024 Bedtime    mometasone-formoterol (DULERA) 100-5 MCG/ACT inhaler Inhale 2 puffs into the lungs 2 times daily. 11/3/2024 Morning    vitamin B-12 (CYANOCOBALAMIN) 100 MCG tablet Take 100 mcg by mouth daily (with lunch). 11/3/2024 Noon    vitamin D3 (CHOLECALCIFEROL) 50 mcg (2000 units) tablet Take 2 tablets by mouth daily (with lunch). 11/3/2024 Noon

## 2024-11-04 NOTE — DISCHARGE SUMMARY
[facility]  Hospitalist Discharge Summary      Date of Admission:  11/3/2024  Date of Discharge:  11/4/2024  Discharging Provider: JULIUS DAWKINS MD  Discharge Service: Hospitalist Service    Discharge Diagnoses   # COPD exacerbation  # Acute respiratory failure with hypoxia - resolved     Clinically Significant Risk Factors          Follow-ups Needed After Discharge   Follow-up Appointments       Follow-up and recommended labs and tests       Follow up with primary care provider, Howard Luu, within 7 days for hospital follow- up.  No follow up labs or test are needed.                  Discharge Disposition   Discharged to home  Condition at discharge: Good    Hospital Course   Karen Santiago is a 89 year old male with history of COPD, essential hypertension, bladder cancer with ileal conduit, lung cancer status post radiation treatment, iron deficiency anemia admitted on 11/3/2024 for COPD exacerbation and acute respiratory failure with hypoxia. Etiology of COPD exacerbation was unclear but was managed with steroid and YIFAN/RAMESH combination. Also received Azithromycin (which he'll complete in the outpatient setting) and also discharge on oral prednisone. Home O2 assessment completed, did not qualify for home O2.     Consultations This Hospital Stay   OCCUPATIONAL THERAPY ADULT IP CONSULT  CARE MANAGEMENT / SOCIAL WORK IP CONSULT  PHYSICAL THERAPY ADULT IP CONSULT    Code Status   Full Code    Time Spent on this Encounter   I, JULIUS DAWKINS MD, personally saw the patient today and spent greater than 30 minutes discharging this patient.       JULIUS DAWKINS MD  [address] [address]  Phone: [phone]  Fax: [phone]  ______________________________________________________________________    Physical Exam   Vital Signs: Temp: 98.3  F (36.8  C) Temp src: Oral BP: (!) 151/70 Pulse: 87   Resp: 17 SpO2:  97 % O2 Device: Nasal cannula Oxygen Delivery: 2 LPM  Weight: 173 lbs 14.4 oz    General Appearance: Lying comfortably in bed, on room air, in no acute distress of discomfort  HEENT: PERRL: EOMI; moist mucous membrane w/o lesions  Neck: No JVD  Pulmonary: Clear to auscultation bilaterally, no wheezes or crackles  CVS: Regular rhythm, no murmurs, rubs or gallops  GI: BS (+), soft nontender, no rebound or guarding   Extremities: No LE edema.   Skin: No rashes or lesions  Neurologic: A&O x3         Primary Care Physician   Howard Luu    Discharge Orders      Reason for your hospital stay    Admitted with COPD exacerbation.  Complicated by transient hypoxia which resolved subsequently.     Follow-up and recommended labs and tests     Follow up with primary care provider, Howard Luu, within 7 days for hospital follow- up.  No follow up labs or test are needed.     Activity    Your activity upon discharge: activity as tolerated     Diet    Follow this diet upon discharge: Current Diet:Orders Placed This Encounter      Combination Diet Regular Diet Adult       Significant Results and Procedures   Most Recent 3 CBC's:  Recent Labs   Lab Test 11/04/24  0634 11/03/24  1728 09/24/24  1106   WBC 8.3 7.4 4.3   HGB 12.2* 11.7* 12.5*   MCV 94 94 96    203 180     Most Recent 3 BMP's:  Recent Labs   Lab Test 11/04/24  0634 11/03/24  1728 09/24/24  1106    139 138   POTASSIUM 4.5 4.4 4.7   CHLORIDE 103 104 102   CO2 20* 22 25   BUN 20.8 22.0 20.9   CR 0.76 0.85 0.80   ANIONGAP 13 13 11   ADY 9.2 8.7* 9.1   * 114* 109*     Most Recent 2 LFT's:  Recent Labs   Lab Test 09/24/24  1106 05/28/24  0647   AST 23 16   ALT 14 7   ALKPHOS 55 58   BILITOTAL 0.4 0.3     Most Recent 3 INR's:  Recent Labs   Lab Test 05/28/24  0647 02/14/24  1106 07/16/18  0649   INR 0.92 0.98 1.06   ,   Results for orders placed or performed during the hospital encounter of 11/03/24   CT Chest Pulmonary Embolism w Contrast     Narrative    EXAM: CT CHEST PULMONARY EMBOLISM W CONTRAST  LOCATION: [facility]  DATE: 11/3/2024    INDICATION: sob hypoxia  COMPARISON: 5/20/2024  TECHNIQUE: CT chest pulmonary angiogram during arterial phase injection of IV contrast. Multiplanar reformats and MIP reconstructions were performed. Dose reduction techniques were used.   CONTRAST: 90 mL Isovue-370    FINDINGS:  ANGIOGRAM CHEST: Pulmonary arteries are negative for pulmonary emboli. Dilatation of main pulmonary artery appears less pronounced on current study. Thoracic aorta is negative for dissection. No CT evidence of right heart strain.    LUNGS AND PLEURA: Resolution of previous pleural effusions. Localized lingular fibrosis about fiducial marker consistent with postradiation treatment change. Minimal presumed atelectasis dependently at lung bases. Small pulmonary nodules within right   lung are unchanged, largest discrete nodule 5 mm right base near the hemidiaphragm. Mild pleural thickening and slight calcified pleural plaque posteriorly on the left unchanged.    MEDIASTINUM/AXILLAE: Normal.    CORONARY ARTERY CALCIFICATION: Moderate.    UPPER ABDOMEN: Cholelithiasis, hepatic cysts and calcification at hepatic dome unchanged. Small right adrenal adenoma.    MUSCULOSKELETAL: No suspicious bony lesions.      Impression    IMPRESSION:  1.  No pulmonary emboli evident.  2.  Degree of central pulmonary arterial prominence has diminished.  3.  Resolution of prior small pleural effusions. Mild atelectasis and scarring persists. No new infiltrate.  4.  Tiny pulmonary nodules unchanged and will continue to be evaluated on subsequent exams       Discharge Medications   Current Discharge Medication List        START taking these medications    Details   azithromycin (ZITHROMAX) 250 MG tablet Take 1 tablet (250 mg) by mouth daily for 3 days.  Qty: 3 tablet, Refills: 0    Associated Diagnoses: COPD exacerbation (H)      predniSONE  (DELTASONE) 20 MG tablet Take 2 tablets (40 mg) by mouth daily for 4 days.  Qty: 8 tablet, Refills: 0    Associated Diagnoses: COPD exacerbation (H)           CONTINUE these medications which have NOT CHANGED    Details   acetaminophen (TYLENOL) 325 MG tablet Take 2 tablets (650 mg) by mouth every 4 hours as needed for other (mild pain)  Qty: 100 tablet, Refills: 0    Associated Diagnoses: Primary osteoarthritis of right knee      albuterol (PROAIR HFA/PROVENTIL HFA/VENTOLIN HFA) 108 (90 Base) MCG/ACT inhaler INHALE TWO PUFFS BY MOUTH EVERY FOUR HOURS AS NEEDED FOR WHEEZING  Qty: 18 g, Refills: 1    Comments: Pharmacy may dispense brand covered by insurance (Proair, or proventil or ventolin or generic albuterol inhaler)  Associated Diagnoses: COPD exacerbation (H)      amLODIPine (NORVASC) 10 MG tablet TAKE ONE TABLET BY MOUTH ONE TIME DAILY  Qty: 90 tablet, Refills: 3    Associated Diagnoses: Essential hypertension, benign      FLUoxetine (PROZAC) 20 MG capsule Take 1 capsule (20 mg) by mouth daily  Qty: 90 capsule, Refills: 3    Associated Diagnoses: Anxiety      losartan (COZAAR) 100 MG tablet TAKE ONE TABLET BY MOUTH ONE TIME DAILY  Qty: 90 tablet, Refills: 3    Associated Diagnoses: Essential hypertension, benign      melatonin 3 MG tablet Take 3 mg by mouth at bedtime.      mometasone-formoterol (DULERA) 100-5 MCG/ACT inhaler Inhale 2 puffs into the lungs 2 times daily.  Qty: 13 g, Refills: 3    Associated Diagnoses: Pulmonary emphysema, unspecified emphysema type (H)      vitamin B-12 (CYANOCOBALAMIN) 100 MCG tablet Take 100 mcg by mouth daily (with lunch).      vitamin D3 (CHOLECALCIFEROL) 50 mcg (2000 units) tablet Take 2 tablets by mouth daily (with lunch).           Allergies   No Known Allergies

## 2024-11-04 NOTE — H&P
[facility]    History and Physical - Hospitalist Service       Date of Admission:  11/3/2024    Assessment & Plan      Karen Santiago is a 89 year old male with history of COPD, essential hypertension, bladder cancer with ileal conduit, lung cancer status post radiation treatment, iron deficiency anemia admitted on 11/3/2024 for COPD exacerbation and acute respiratory failure with hypoxia.    COPD exacerbation  Acute respiratory failure with hypoxia  No pneumonia on CT chest, CTA chest negative for PE.  Order procalcitonin  Hold Dulera 2 puffs twice daily  Start prednisone 40 mg daily for 5 days  Start azithromycin 500 mg now then to 50 mg daily thereafter  Start DuoNeb 4 times daily  Albuterol neb every 2 hours as needed  Supplemental oxygen as needed  Guaifenesin as needed for secretions cough.    Essential hypertension  PTA medication: Amlodipine 10 mg daily, losartan 100 mg daily.    Depression anxiety  Fluoxetine 20 mg daily    Bladder cancer with ileal conduit  Lung cancer status post radiation treatment  CT chest without progression of pulmonary nodules    Iron deficiency anemia  Hemoglobin stable 11.7 g/dL  Repeat CBC in AM.        Diet: Combination Diet Regular Diet Adult  DVT Prophylaxis: Pneumatic Compression Devices  Ramos Catheter: Not present  Lines: None     Cardiac Monitoring: None  Code Status: Full Code    Clinically Significant Risk Factors Present on Admission                   # Hypertension: Noted on problem list                    Disposition Plan     Medically Ready for Discharge: Anticipated in 2-4 Days           Yaya Matute DO  Hospitalist Service  [facility]  Securely message with Lis (more info)  Text page via [facility] Paging/Directory     ______________________________________________________________________    Chief Complaint   Dyspnea on exertion.    History is obtained from the patient    History of Present Illness   Karen  MARLEN Santiago is a 89 year old male with history of COPD, hypertension, bladder cancer with ileal conduit, lung cancer status post radiation treatments, iron deficiency anemia, who presents with increased shortness of breath complaints.  Patient reported he was feeling well on the morning of 11/3/2024.  Around noon time he began feeling more short of breath.  Shortness of breath was brought on by any activity.  He attempted to use an albuterol neb at home which did not help.  Due to ongoing shortness of breath he presented to the emergency room for further evaluation.    In the emergency room patient's initial SpO2 was measured 85%.  He was described as dusky appearing according to ED provider.  Placed on supplemental oxygen via nasal cannula with immediate improvement in SpO2 currently on 2 L/min saturating 94%.  Laboratory studies were unremarkable except for hemoglobin 11.7.  SARS, influenza and RSV screen negative.  CT chest PE protocol showed no pulmonary embolism.  No new infiltrate.  Mild atelectasis.  Patient has small pulmonary nodules which are unchanged when compared to previous CT scan from 5/20/2024.      Past Medical History    Past Medical History:   Diagnosis Date    Cancer (H)     bladder    COPD (chronic obstructive pulmonary disease) (H)     Hypertension     Rheumatoid arthritis (H)        Past Surgical History   Past Surgical History:   Procedure Laterality Date    ARTHROPLASTY KNEE Right 3/20/2024    Procedure: RIGHT TOTAL KNEE ARTHROPLASTY;  Surgeon: Kishor Mckeon MD;  Location: [facility] OR    HC REMOVAL TESTIS,SIMPLE      Description: Orchiectomy Left;  Recorded: 04/26/2010;  Comments: for benign interstitial tumor    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 Taking?   FLUoxetine (PROZAC) 20 MG capsule 11/2/2024 Evening  No Yes   Sig: Take 1 capsule (20 mg) by mouth daily   acetaminophen (TYLENOL) 325 MG tablet 11/3/2024  No Yes   Sig: Take 2 tablets (650 mg) by mouth every 4 hours as needed for other (mild pain)   albuterol (PROAIR HFA/PROVENTIL HFA/VENTOLIN HFA) 108 (90 Base) MCG/ACT inhaler 11/3/2024  No Yes   Sig: INHALE TWO PUFFS BY MOUTH EVERY FOUR HOURS AS NEEDED FOR WHEEZING   amLODIPine (NORVASC) 10 MG tablet 11/3/2024 Morning  No Yes   Sig: TAKE ONE TABLET BY MOUTH ONE TIME DAILY   losartan (COZAAR) 100 MG tablet 11/3/2024 Morning  No Yes   Sig: TAKE ONE TABLET BY MOUTH ONE TIME DAILY   melatonin 3 MG tablet 11/2/2024 Bedtime  Yes Yes   Sig: Take 3 mg by mouth at bedtime.   mometasone-formoterol (DULERA) 100-5 MCG/ACT inhaler 11/3/2024 Morning  No Yes   Sig: Inhale 2 puffs into the lungs 2 times daily.   vitamin B-12 (CYANOCOBALAMIN) 100 MCG tablet 11/3/2024 Noon  Yes Yes   Sig: Take 100 mcg by mouth daily (with lunch).   vitamin D3 (CHOLECALCIFEROL) 50 mcg (2000 units) tablet 11/3/2024 Noon  Yes Yes   Sig: Take 2 tablets by mouth daily (with lunch).      Facility-Administered Medications: None        Review of Systems    The 10 point Review of Systems is negative other than noted in the HPI or here.    Social History   I have reviewed this patient's social history and updated it with pertinent information if needed.  Social History     Tobacco Use    Smoking status: Former     Current packs/day: 0.00     Types:  Cigarettes     Quit date: 1980     Years since quittin.8     Passive exposure: Past    Smokeless tobacco: Never   Vaping Use    Vaping status: Never Used   Substance Use Topics    Alcohol use: No     Comment: Alcoholic Drinks/day: 2-3 beer per month    Drug use: No         Family History     No significant family history.      Allergies   No Known Allergies     Physical Exam   Vital Signs: Temp: 99.1  F (37.3  C) Temp src: Temporal BP: 139/65 Pulse: 68   Resp: 30 SpO2: 96 % O2 Device: Nasal cannula Oxygen Delivery: 2 LPM  Weight: 176 lbs 0 oz    General Appearance: No apparent distress well-nourished.  Respiratory: Diminished breath sounds clear to auscultation.  Cardiovascular: Regular rate and rhythm no murmur bruit rub or gallop  GI: Nondistended, normoactive bowel sounds, nontender to palpation.  Skin: Warm and dry, no exanthem on exposed skin.  Other: Awake alert oriented x 3, moving all extremities, general sensation intact all extremities.    Medical Decision Making       80 MINUTES SPENT BY ME on the date of service doing chart review, history, exam, documentation & further activities per the note.      Data     I have personally reviewed the following data over the past 24 hrs:    7.4  \   11.7 (L)   / 203     139 104 22.0 /  114 (H)   4.4 22 0.85 \     Trop: 20 BNP: 243       Imaging results reviewed over the past 24 hrs:   Recent Results (from the past 24 hours)   CT Chest Pulmonary Embolism w Contrast    Narrative    EXAM: CT CHEST PULMONARY EMBOLISM W CONTRAST  LOCATION: [facility]  DATE: 11/3/2024    INDICATION: sob hypoxia  COMPARISON: 2024  TECHNIQUE: CT chest pulmonary angiogram during arterial phase injection of IV contrast. Multiplanar reformats and MIP reconstructions were performed. Dose reduction techniques were used.   CONTRAST: 90 mL Isovue-370    FINDINGS:  ANGIOGRAM CHEST: Pulmonary arteries are negative for pulmonary emboli. Dilatation of main  pulmonary artery appears less pronounced on current study. Thoracic aorta is negative for dissection. No CT evidence of right heart strain.    LUNGS AND PLEURA: Resolution of previous pleural effusions. Localized lingular fibrosis about fiducial marker consistent with postradiation treatment change. Minimal presumed atelectasis dependently at lung bases. Small pulmonary nodules within right   lung are unchanged, largest discrete nodule 5 mm right base near the hemidiaphragm. Mild pleural thickening and slight calcified pleural plaque posteriorly on the left unchanged.    MEDIASTINUM/AXILLAE: Normal.    CORONARY ARTERY CALCIFICATION: Moderate.    UPPER ABDOMEN: Cholelithiasis, hepatic cysts and calcification at hepatic dome unchanged. Small right adrenal adenoma.    MUSCULOSKELETAL: No suspicious bony lesions.      Impression    IMPRESSION:  1.  No pulmonary emboli evident.  2.  Degree of central pulmonary arterial prominence has diminished.  3.  Resolution of prior small pleural effusions. Mild atelectasis and scarring persists. No new infiltrate.  4.  Tiny pulmonary nodules unchanged and will continue to be evaluated on subsequent exams

## 2024-11-04 NOTE — PROGRESS NOTES
"Blood pressure (!) 148/69, pulse 68, temperature 98.2  F (36.8  C), temperature source Oral, resp. rate 30, height 1.803 m (5' 11\"), weight 79.8 kg (176 lb), SpO2 96%.    Report received from ED nurse, introduced self to patient, appears A&O X4, is vitally stable on room air. Initial assessment done ( See flow sheets), medication orders effected. He denies pain, has a urostomy in place, urine is clear yellow. Lung sounds are diminished, no rubs or rales.     Bed locked in low position, white board updated.  Nursing patient education done on use of call light. Call light within reach, patient acknowledges understanding.     Report given to JOSE MANUEL Martinez on [address], patient to be transferred to room 364, cares to be assumed by said RN.  "

## 2024-11-04 NOTE — PROGRESS NOTES
Occupational Therapy Discharge Summary    Reason for therapy discharge:    All goals and outcomes met, no further needs identified.    Progress towards therapy goal(s). See goals on Care Plan in [facility] electronic health record for goal details.  Goals met    Therapy recommendation(s):    No further therapy is recommended.

## 2024-11-04 NOTE — PLAN OF CARE
Goal Outcome Evaluation:      Plan of Care Reviewed With: patient    Overall Patient Progress: improvingOverall Patient Progress: improving    Outcome Evaluation: Pt alert and oriented x 4. Home O2 eval complete, MD aware and he does not qualify for home O2. Pt will be discharging home with family on PO azithromycin and prednisone. He is indep at baseline and reports his breathing during ambulation is now similar to his baseline. AVS reviewed with the patient and family. IV out.      Problem: Gas Exchange Impaired  Goal: Optimal Gas Exchange  Outcome: Met     Problem: Comorbidity Management  Goal: Maintenance of COPD Symptom Control  Outcome: Met     Problem: Comorbidity Management  Goal: Maintenance of COPD Symptom Control  Intervention: Maintain COPD Symptom Control  Recent Flowsheet Documentation  Taken 11/4/2024 0942 by Jaleel Sage, RN  Medication Review/Management: medications reviewed     Jaleel Sage RN, ONC

## 2024-11-04 NOTE — PROGRESS NOTES
"   11/04/24 1050   Appointment Info   Signing Clinician's Name / Credentials (OT) Shonna Phillips OTR   Rehab Comments (OT) OT EVAL   Living Environment   People in Home alone   Current Living Arrangements house  ([place] style, main level with basement)   Home Accessibility stairs within home;stairs to enter home   Living Environment Comments Able to stay on the main level but uses his basement for laundry and \"for exercise\"   Self-Care   Usual Activity Tolerance good   Current Activity Tolerance moderate   Regular Exercise Yes   Activity/Exercise Type biking;walking   Exercise Amount/Frequency daily   Equipment Currently Used at Home cane, straight;grab bar, toilet;grab bar, tub/shower;shower chair;walker, rolling;raised toilet seat   Fall history within last six months no   Activity/Exercise/Self-Care Comment PLOF: independent with all self-care, active daily. Daughter lives about 10 minutes away   Instrumental Activities of Daily Living (IADL)   Previous Responsibilities meal prep;housekeeping;laundry;shopping;yardwork;finances;driving   General Information   Onset of Illness/Injury or Date of Surgery 11/03/24   Referring Physician Dalia   Patient/Family Therapy Goal Statement (OT) Go home ASAP   Additional Occupational Profile Info/Pertinent History of Current Problem Karen Santiago is a 89 year old male with history of COPD, essential hypertension, bladder cancer with ileal conduit, lung cancer status post radiation treatment, iron deficiency anemia admitted on 11/3/2024 for COPD exacerbation and acute respiratory failure with hypoxia   Cognitive Status Examination   Orientation Status orientation to person, place and time   Pain Assessment   Patient Currently in Pain No   Bed Mobility   Bed Mobility supine-sit;sit-supine   Comment (Bed Mobility) Independent with mobility   Transfers   Transfers No deficits identified;bed-chair transfer;toilet transfer;shower transfer   Activities of Daily Living   BADL " Assessment/Intervention no deficits identified   Clinical Impression   Criteria for Skilled Therapeutic Interventions Met (OT) Yes, treatment indicated   OT Diagnosis Decreased indep with ADL   OT Problem List-Impairments impacting ADL problems related to;activity tolerance impaired   Assessment of Occupational Performance 1-3 Performance Deficits   Identified Performance Deficits activity tolerance with mobiltiy/stairs   Planned Therapy Interventions (OT) risk factor education;other (see comments)  (stairs)   Clinical Decision Making Complexity (OT) problem focused assessment/low complexity   Risk & Benefits of therapy have been explained evaluation/treatment results reviewed   OT Total Evaluation Time   OT Eval, Low Complexity Minutes (24515) 10   OT Goals   Therapy Frequency (OT) One time eval and treatment   OT Predicted Duration/Target Date for Goal Attainment 11/04/24   OT Goals OT Goal 1;OT Goal 2   OT: Goal 1 Pt will manage 8 consecutive steps using hand rail with mod indep and maintanence of O2 saturation at/above 90% GOAL MET   OT: Goal 2 Pt will verbalize understanding of Energy Conservation Techniques to use during self-care activities. GOAL MET   Interventions   Interventions Quick Adds Therapeutic Activity   Therapeutic Activities   Therapeutic Activity Minutes (77168) 20   Symptoms noted during/after treatment shortness of breath   Treatment Detail/Skilled Intervention Pt agreeable to OT session; his daughter present. Pt indepndent with mobiltiy within the room without AD. Able to compelte aneta-care activities without assistance. Ambulated on unit and over 176ft x2 to therapy gym. Managed stairs using railings; O2 91% after walking, 94% after stairs. Educated pt on using a pulse ox at home to monitor his O2 level (pt's daughter to purchase one). Pt educated on ECT - he already had a good understanding, but gave him some insight into ECT to use while showering, doing work around the house. Pt left in  his room with his daughter.   OT Discharge Planning   OT Plan DC OT   OT Discharge Recommendation (DC Rec) home   OT Rationale for DC Rec Pt independent with self-care, mobiltiy. Met all goals. Supportive daughter who lives nearby.   OT Brief overview of current status Pt independent with self-care, mobiltiy. Met all goals.   Total Session Time   Timed Code Treatment Minutes 20   Total Session Time (sum of timed and untimed services) 30

## 2024-11-04 NOTE — ED NOTES
Pt states an onset on increasing shortness of breath at 1200 today.  History of COPD and symptoms normally resolve with home neb treatments.  Did nebs x1 without relief.  Daughter states she became concerned because he seemed to be getting worse so they came to the ED for evaluation.  Upon arrival to Triage patient was noted to have dusky extremities with 02 sats in the 80's.  Placed on oxygen via nasal cannula with improvement of oxygen levels.  Denies cough, fevers or ill contacts.

## 2024-11-04 NOTE — PLAN OF CARE
PT admitted to unit at 2200. 4lo2nc. Pt has a slight tremor in hands. Chronic urostomy in place. Regular diet.       Bello Shah RN on 11/3/2024 at 11:03 PM

## 2024-11-04 NOTE — CONSULTS
Care Management Follow Up    Length of Stay (days): 1    Expected Discharge Date: 11/04/2024     Concerns to be Addressed: no discharge needs identified     Patient plan of care discussed at interdisciplinary rounds: Yes    Anticipated Discharge Disposition: Home     Anticipated Discharge Services: None  Anticipated Discharge DME: None    Patient/family educated on Medicare website which has current facility and service quality ratings: no  Education Provided on the Discharge Plan:    Patient/Family in Agreement with the Plan: yes    Referrals Placed by CM/SW:    Private pay costs discussed: Not applicable    Discussed  Partnership in Safe Discharge Planning  document with patient/family: No     Handoff Completed: no    Additional Information:  11:29 AM  Consult completed. Chart review. Pt lives alone at home. Therapy signed off. Pt is independent at baseline. Pt has good support from pt's daughter. No Cm needs identified or desired. Family to transport home.     JONI GoffW

## 2024-11-04 NOTE — PLAN OF CARE
PT: Chart reviewed and discussed with OT. Pt IND with mobility without assistive devices with walking and stairs. No mobility concerns for discharge home. No IP PT needs identified. Will sign off.

## 2024-11-04 NOTE — PLAN OF CARE
Pt is alert and oriented x4. VSS on 2L of O2 overnight. Denying pain and SOB when at rest, reports some with walking. Lung sounds are diminished. Urostomy bag patent and stoma red. Regular diet. PIV is SL. SBA.     Problem: Gas Exchange Impaired  Goal: Optimal Gas Exchange  Outcome: Progressing  Intervention: Optimize Oxygenation and Ventilation  Recent Flowsheet Documentation  Taken 11/4/2024 0026 by Guera Nelson RN  Head of Bed (HOB) Positioning: HOB at 20-30 degrees     Problem: Comorbidity Management  Goal: Blood Pressure in Desired Range  Outcome: Progressing  Intervention: Maintain Blood Pressure Management  Recent Flowsheet Documentation  Taken 11/4/2024 0026 by Guera Nelson RN  Medication Review/Management: medications reviewed  Goal: Maintenance of COPD Symptom Control  Outcome: Progressing  Intervention: Maintain COPD Symptom Control  Recent Flowsheet Documentation  Taken 11/4/2024 0026 by Guera Nelson, RN  Medication Review/Management: medications reviewed

## 2024-11-05 ENCOUNTER — PATIENT OUTREACH (OUTPATIENT)
Dept: CARE COORDINATION | Facility: CLINIC | Age: 89
End: 2024-11-05
Payer: COMMERCIAL

## 2024-11-05 NOTE — PROGRESS NOTES
"Clinic Care Coordination Contact  Transitions of Care Outreach  Chief Complaint   Patient presents with    Clinic Care Coordination - Post Hospital       Most Recent Admission Date: 11/3/2024   Most Recent Admission Diagnosis: COPD exacerbation (H) - J44.1  Acute hypoxemic respiratory failure (H) - J96.01     Most Recent Discharge Date: 11/4/2024   Most Recent Discharge Diagnosis: Acute hypoxemic respiratory failure (H) - J96.01  COPD exacerbation (H) - J44.1     Transitions of Care Assessment    Discharge Assessment  How are you doing now that you are home?: \"I'm good I'm feeling 100% this morning.\"  How are your symptoms? (Red Flag symptoms escalate to triage hotline per guidelines): Improved  Do you know how to contact your clinic care team if you have future questions or changes to your health status? : Yes  Does the patient have their discharge instructions? : Yes  Does the patient have questions regarding their discharge instructions? : No  Were you started on any new medications or were there changes to any of your previous medications? : Yes  Does the patient have all of their medications?: Yes  Do you have questions regarding any of your medications? : No  Do you have all of your needed medical supplies or equipment (DME)?  (i.e. oxygen tank, CPAP, cane, etc.): Yes    Post-op ([facility] CTA Only)  If the patient had a surgery or procedure, do they have any questions for a nurse?: No         [facility] Explained and offered Care Coordination support to eligible patients: Yes    Patient accepted? No    Follow up Plan     Discharge Follow-Up  Discharge follow up appointment scheduled in alignment with recommended follow up timeframe or Transitions of Risk Category? (Low = within 30 days; Moderate= within 14 days; High= within 7 days): Yes  Discharge Follow Up Appointment Date: 11/13/24  Discharge Follow Up Appointment Scheduled with?: Primary Care Provider ([facility] scheduled pt a PCP f/u appt)    Future Appointments   Date " Time Provider Department Center   11/13/2024 11:30 AM Howard Luu PA-C [facility]   1/7/2025 10:50 AM Perez Carlos DO [facility]       Outpatient Plan as outlined on AVS reviewed with patient.    For any urgent concerns, please contact our 24 hour nurse triage line: [phone] ([phone])       ANNIKA Saldana  [phone]  [facility] Care Resource [facility]

## 2024-11-13 ENCOUNTER — OFFICE VISIT (OUTPATIENT)
Dept: FAMILY MEDICINE | Facility: CLINIC | Age: 89
End: 2024-11-13
Payer: COMMERCIAL

## 2024-11-13 VITALS
WEIGHT: 178 LBS | RESPIRATION RATE: 12 BRPM | HEART RATE: 72 BPM | TEMPERATURE: 98 F | HEIGHT: 71 IN | OXYGEN SATURATION: 97 % | BODY MASS INDEX: 24.92 KG/M2

## 2024-11-13 DIAGNOSIS — C66.1 MALIGNANT NEOPLASM OF RIGHT URETER (H): ICD-10-CM

## 2024-11-13 DIAGNOSIS — J96.01 ACUTE HYPOXEMIC RESPIRATORY FAILURE (H): ICD-10-CM

## 2024-11-13 DIAGNOSIS — F41.9 ANXIETY: ICD-10-CM

## 2024-11-13 DIAGNOSIS — J43.9 PULMONARY EMPHYSEMA, UNSPECIFIED EMPHYSEMA TYPE (H): Primary | ICD-10-CM

## 2024-11-13 DIAGNOSIS — Z93.6 S/P ILEAL CONDUIT (H): ICD-10-CM

## 2024-11-13 DIAGNOSIS — C34.12 MALIGNANT NEOPLASM OF UPPER LOBE OF LEFT LUNG (H): ICD-10-CM

## 2024-11-13 DIAGNOSIS — C67.9 MALIGNANT NEOPLASM OF URINARY BLADDER, UNSPECIFIED SITE (H): ICD-10-CM

## 2024-11-13 DIAGNOSIS — N18.31 CHRONIC KIDNEY DISEASE, STAGE 3A (H): ICD-10-CM

## 2024-11-13 PROCEDURE — 99495 TRANSJ CARE MGMT MOD F2F 14D: CPT | Performed by: PHYSICIAN ASSISTANT

## 2024-11-13 RX ORDER — FLUOXETINE 40 MG/1
40 CAPSULE ORAL DAILY
Qty: 90 CAPSULE | Refills: 3 | Status: SHIPPED | OUTPATIENT
Start: 2024-11-13

## 2024-11-13 RX ORDER — FLUTICASONE FUROATE, UMECLIDINIUM BROMIDE AND VILANTEROL TRIFENATATE 100; 62.5; 25 UG/1; UG/1; UG/1
1 POWDER RESPIRATORY (INHALATION) DAILY
Qty: 60 EACH | Refills: 11 | Status: SHIPPED | OUTPATIENT
Start: 2024-11-13

## 2024-11-13 NOTE — PROGRESS NOTES
Assessment & Plan     Pulmonary emphysema, unspecified emphysema type (H)  Acute hypoxemic respiratory failure (H)- resolved  Hospitalized at [facility] from 11/3 to 11/4 for acute hypoxic respiratory failure requiring oxygen along with COPD exacerbation.  CT did not show any evidence of infection.  He was treated with steroids along with Hernan/Awilda medications.  He was also discharged on azithromycin and oral prednisone.  Symptoms have fully resolved and he feels that his breathing is back to baseline.  He has had multiple COPD exacerbations this year.  2 ER visits 2/20/2024 and 5/20/2024.  Recent hospitalization as well.  I feel that his COPD is not well-controlled on Dulera.  We will trial Trelegy.  He was seen by pulmonology in the past as he does have an underlying history of lung cancer.  He has been reluctant to go back to pulmonology.  We did discuss if he has another hospitalization due to COPD I would like him to reestablish with pulmonology and he agrees.  He is also interested in home oxygen as he is getting somewhat winded with walking.  We will see if he qualifies for home oxygen and do a 6-minute walk test.  - Fluticasone-Umeclidin-Vilant (TRELEGY ELLIPTA) 100-62.5-25 MCG/ACT oral inhaler; Inhale 1 puff into the lungs daily.  - 6 minute walk test; Future    Anxiety  We will increase Prozac to 40 mg.  He is currently on 20 mg and tolerating the medication without side effects.  He is noting some intermittent anxiety still but anxiety is much better since he has been on the Prozac.  He is to let me know in 4 to 6 weeks if he is not having any improvement in symptoms.  - FLUoxetine (PROZAC) 40 MG capsule; Take 1 capsule (40 mg) by mouth daily.    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  CT chest in the ER did not show any evidence of malignancy.    Malignant neoplasm of the right ureter  Malignant neoplasm of the bladder, unspecified site  S/P ileal conduit  (H)  Diagnosed with right ureter cancer and bladder cancer in 2022.  Recurrence in 2012.  Diagnosed with squamous cell cancer of the bladder.  Region biopsy 2021 was negative for reoccurrence.  Following with oncology.  Stable at this time.    Chronic kidney disease, stage 3a (H)  Renal function 11/4/2024 was stable with a creatinine of 0.76 and a GFR of 85.        MED REC REQUIRED  Post Medication Reconciliation Status: discharge medications reconciled and changed, per note/orders      Subjective   Karen is a 89 year old, presenting for the following health issues:  Hospital F/U (Pt reports he was at [facility] from 11/3/24-11/4/24 for COPD exacerbation. Pt would like to discuss having oxygen ordered. Pt would like to discuss anxiety and increasing his dose)        11/13/2024    10:58 AM   Additional Questions   Roomed by Zuleyka Sheehan   Accompanied by dustin     Drea is a pleasant 89-year-old male who presents to the clinic today for hospital follow-up.  He was admitted at [facility] from 11/3/2024 to 11/4/2024 for acute hypoxic respiratory failure and COPD exacerbation.  He was having troubles with cough, shortness of breath, and increased mucus production when he presented to the ER.  He was found to be hypoxic so was on oxygen in the hospital.  He did undergo CT of his chest which did not show any evidence of infection.  He was treated with prednisone in the hospital.  He was discharged and he is feeling much better.  Symptoms are back to baseline.  He states that when he walks long distance he is getting winded and short of breath.  He is wondering about home oxygen.    He continues to have some underlying anxiety.  His anxiety has improved since he has been on the Prozac.  He is currently on 20 mg.  There are days where his anxiety is worse than others.  He is interested in trying to increase the Prozac to see if this helps decrease anxiety further.  He is tolerating the Prozac without any side  "effects.            Hospital Follow-up Visit:    Hospital/Nursing Home/IP Rehab Facility: [facility]  Date of Admission: 11/3/24  Date of Discharge: 11/4/24  Reason(s) for Admission: COPD exacerbation  Was the patient in the ICU or did the patient experience delirium during hospitalization?  No  Do you have any other stressors you would like to discuss with your provider? No    Problems taking medications regularly:  None  Medication changes since discharge: None  Problems adhering to non-medication therapy:  None    Summary of hospitalization:  [facility] discharge summary reviewed  Diagnostic Tests/Treatments reviewed.  Follow up needed: none  Other Healthcare Providers Involved in Patient s Care:         None  Update since discharge: improved.         Plan of care communicated with patient                 Objective    Pulse 72   Temp 98  F (36.7  C) (Oral)   Resp 12   Ht 1.803 m (5' 11\")   Wt 80.7 kg (178 lb)   SpO2 97%   BMI 24.83 kg/m    Body mass index is 24.83 kg/m .  Physical Exam  Vitals and nursing note reviewed.   Constitutional:       General: He is not in acute distress.     Appearance: Normal appearance. He is not ill-appearing, toxic-appearing or diaphoretic.   HENT:      Head: Normocephalic and atraumatic.   Eyes:      Conjunctiva/sclera: Conjunctivae normal.   Cardiovascular:      Rate and Rhythm: Normal rate and regular rhythm.      Heart sounds: No murmur heard.     No friction rub. No gallop.   Pulmonary:      Effort: Pulmonary effort is normal.      Breath sounds: Decreased breath sounds present. No wheezing, rhonchi or rales.   Skin:     General: Skin is warm and dry.      Capillary Refill: Capillary refill takes less than 2 seconds.      Findings: No rash.   Neurological:      General: No focal deficit present.      Mental Status: He is alert and oriented to person, place, and time.      Motor: No weakness.   Psychiatric:         Mood and Affect: " Mood normal.         Behavior: Behavior normal.         Thought Content: Thought content normal.         Judgment: Judgment normal.                Signed Electronically by: Howard Luu PA-C

## 2024-11-15 ENCOUNTER — HOSPITAL ENCOUNTER (OUTPATIENT)
Dept: RESPIRATORY THERAPY | Facility: CLINIC | Age: 89
Discharge: HOME | End: 2024-11-15
Admitting: PHYSICIAN ASSISTANT
Payer: COMMERCIAL

## 2024-11-15 DIAGNOSIS — J44.1 COPD EXACERBATION (H): Primary | ICD-10-CM

## 2024-11-15 DIAGNOSIS — J43.9 PULMONARY EMPHYSEMA, UNSPECIFIED EMPHYSEMA TYPE (H): ICD-10-CM

## 2024-11-15 PROCEDURE — 94618 PULMONARY STRESS TESTING: CPT

## 2024-11-15 PROCEDURE — 999N000157 HC STATISTIC RCP TIME EA 10 MIN

## 2024-11-15 NOTE — PROGRESS NOTES
Six Minute Walk Test results:    Probe: (finger) Stops: (0)   Patient walked (960 Ft /292.6 m) in 6 minutes.  LLN = (1218.82 Ft / 371.5m)    Pre-walk: SP02 (97%) Heart Rate (66) Yun Dyspnea = (0) Fatigue = (0)   Post-walk: SP02 (97%) Heart Rate (73) Yun Dyspnea = (2) Fatigue = (0)   Recovery time to baseline 02 SAT (0) minutes and HR (6) minutes.     Pt does not use any walking aides, no rests taken during walk. Pt did not require any 02 during six minute walk. Results uploaded into pt's media.

## 2024-11-18 LAB — FIO2-PRE: 21 %

## 2024-11-19 ENCOUNTER — TELEPHONE (OUTPATIENT)
Dept: FAMILY MEDICINE | Facility: CLINIC | Age: 89
End: 2024-11-19
Payer: COMMERCIAL

## 2024-11-19 NOTE — TELEPHONE ENCOUNTER
Spoke to patient and relayed message from provider.  Patient verbalized understanding and agrees with plan.    JONI HathawayN RN  [facility]

## 2024-11-19 NOTE — TELEPHONE ENCOUNTER
----- Message from Howard Luu sent at 11/19/2024  7:44 AM CST -----  Please call pt with 6 min walk test    Based on test he dose not need O2. His O2 did not drop to the point were medicare will pain for home oxygen.

## 2024-12-12 DIAGNOSIS — J44.1 COPD EXACERBATION (H): ICD-10-CM

## 2024-12-12 RX ORDER — ALBUTEROL SULFATE 90 UG/1
INHALANT RESPIRATORY (INHALATION)
Qty: 18 G | Refills: 0 | Status: SHIPPED | OUTPATIENT
Start: 2024-12-12

## 2025-01-07 ENCOUNTER — OFFICE VISIT (OUTPATIENT)
Dept: CARDIOLOGY | Facility: CLINIC | Age: OVER 89
End: 2025-01-07
Attending: PHYSICIAN ASSISTANT
Payer: COMMERCIAL

## 2025-01-07 VITALS
BODY MASS INDEX: 25.48 KG/M2 | RESPIRATION RATE: 18 BRPM | DIASTOLIC BLOOD PRESSURE: 60 MMHG | HEIGHT: 70 IN | WEIGHT: 178 LBS | HEART RATE: 62 BPM | OXYGEN SATURATION: 98 % | SYSTOLIC BLOOD PRESSURE: 148 MMHG

## 2025-01-07 DIAGNOSIS — R55 SYNCOPE AND COLLAPSE: ICD-10-CM

## 2025-01-07 DIAGNOSIS — J43.8 OTHER EMPHYSEMA (H): ICD-10-CM

## 2025-01-07 DIAGNOSIS — I10 PRIMARY HYPERTENSION: ICD-10-CM

## 2025-01-07 DIAGNOSIS — R94.39 ABNORMAL STRESS TEST: Primary | ICD-10-CM

## 2025-01-07 PROCEDURE — 99214 OFFICE O/P EST MOD 30 MIN: CPT | Performed by: STUDENT IN AN ORGANIZED HEALTH CARE EDUCATION/TRAINING PROGRAM

## 2025-01-07 NOTE — PATIENT INSTRUCTIONS
It was a pleasure meeting you today    In summary:    I think your heart is in good shape    Please call my nurse Steve Ghosh at [phone] if you have any questions or issues.    We will schedule a follow up visit in the future if needed      Perez Carlos DO [facility]  Non-invasive Cardiologist  [facility]

## 2025-01-07 NOTE — LETTER
1/7/2025    Howrad Luu PA-C  [address]  [facility] [address]    RE: Karen Faithon       Dear Colleague,     I had the pleasure of seeing Karen Santiago in the [facility].    [facility]   [address]  [place] [address]   [email or web address]   OFFICE: [phone]     CARDIOLOGY CLINIC NOTE     Thank you, Howard Collins, for asking the [facility] Heart Care team to see Mr. Karen Santiago to evaluate Follow Up        History of Present Illness   Mr. Karen Santiago is a 89 year old male with a significant past history of HTN, COPD, CKD,  who presents for follow up.  He was recently in the ED for COPD exacerbation.  He otherwise has been doing well.  He has not had any additional syncopal episodes - that was likely related to dehydration.  Still doing knee exercises.  No new cardiac symptoms.  Breathing is at baseline now on new inhaler.           Medications  Allergies   Current Outpatient Medications   Medication Sig Dispense Refill     acetaminophen (TYLENOL) 325 MG tablet Take 2 tablets (650 mg) by mouth every 4 hours as needed for other (mild pain) 100 tablet 0     albuterol (PROAIR HFA/PROVENTIL HFA/VENTOLIN HFA) 108 (90 Base) MCG/ACT inhaler INHALE TWO PUFFS BY MOUTH EVERY FOUR HOURS AS NEEDED FOR WHEEZING 18 g 0     amLODIPine (NORVASC) 10 MG tablet TAKE ONE TABLET BY MOUTH ONE TIME DAILY 90 tablet 3     FLUoxetine (PROZAC) 40 MG capsule Take 1 capsule (40 mg) by mouth daily. 90 capsule 3     Fluticasone-Umeclidin-Vilant (TRELEGY ELLIPTA) 100-62.5-25 MCG/ACT oral inhaler Inhale 1 puff into the lungs daily. 60 each 11     losartan (COZAAR) 100 MG tablet TAKE ONE TABLET BY MOUTH ONE TIME DAILY 90 tablet 3     melatonin 3 MG tablet Take 3 mg by mouth at bedtime.       vitamin B-12 (CYANOCOBALAMIN) 100 MCG tablet Take 100 mcg by mouth daily (with lunch).       vitamin D3 (CHOLECALCIFEROL) 50  "mcg (2000 units) tablet Take 2 tablets by mouth daily (with lunch).       mometasone-formoterol (DULERA) 100-5 MCG/ACT inhaler Inhale 2 puffs into the lungs 2 times daily. (Patient not taking: Reported on 2025) 13 g 3      No Known Allergies     Physical Examination Review of Systems   Vitals: BP (!) 148/60 (BP Location: Right arm, Patient Position: Sitting, Cuff Size: Adult Regular)   Pulse 62   Resp 18   Ht 1.778 m (5' 10\")   Wt 80.7 kg (178 lb)   SpO2 98%   BMI 25.54 kg/m    BMI= Body mass index is 25.54 kg/m .  Wt Readings from Last 3 Encounters:   25 80.7 kg (178 lb)   24 80.7 kg (178 lb)   24 78.9 kg (173 lb 14.4 oz)       General: pleasant male. No acute distress.   Neck: No JVD  Lungs: clear to auscultation  COR:  regular rate and rhythm, No murmurs, rubs, or gallops  Extrem: No edema        Please refer above for cardiac ROS details.       Past History     Family History: No family history on file.     Social History:   Social History     Socioeconomic History     Marital status:      Spouse name: Not on file     Number of children: Not on file     Years of education: Not on file     Highest education level: Not on file   Occupational History     Not on file   Tobacco Use     Smoking status: Former     Current packs/day: 0.00     Types: Cigarettes     Quit date: 1980     Years since quittin.0     Passive exposure: Past     Smokeless tobacco: Never   Vaping Use     Vaping status: Never Used   Substance and Sexual Activity     Alcohol use: No     Comment: Alcoholic Drinks/day: 2-3 beer per month     Drug use: No     Sexual activity: Not on file   Other Topics Concern     Not on file   Social History Narrative     Not on file     Social Drivers of Health     Financial Resource Strain: Low Risk  (2024)    Financial Resource Strain      Within the past 12 months, have you or your family members you live with been unable to get utilities (heat, electricity) when it " was really needed?: No   Food Insecurity: Low Risk  (11/4/2024)    Food Insecurity      Within the past 12 months, did you worry that your food would run out before you got money to buy more?: No      Within the past 12 months, did the food you bought just not last and you didn t have money to get more?: No   Transportation Needs: Low Risk  (11/4/2024)    Transportation Needs      Within the past 12 months, has lack of transportation kept you from medical appointments, getting your medicines, non-medical meetings or appointments, work, or from getting things that you need?: No   Physical Activity: Insufficiently Active (7/19/2022)    Received from [facility]    Exercise Vital Sign      Days of Exercise per Week: 3 days      Minutes of Exercise per Session: 20 min   Stress: Stress Concern Present (9/24/2024)    [employer] [place] [employer] - Occupational Stress Questionnaire      Feeling of Stress : To some extent   Social Connections: Unknown (9/24/2024)    Social Connection and Isolation Panel [NHANES]      Frequency of Communication with Friends and Family: Not on file      Frequency of Social Gatherings with Friends and Family: Once a week      Attends Jehovah's witness Services: Not on file      Active Member of Clubs or Organizations: Not on file      Attends Club or Organization Meetings: Not on file      Marital Status: Not on file   Interpersonal Safety: Low Risk  (9/24/2024)    Interpersonal Safety      Do you feel physically and emotionally safe where you currently live?: Yes      Within the past 12 months, have you been hit, slapped, kicked or otherwise physically hurt by someone?: No      Within the past 12 months, have you been humiliated or emotionally abused in other ways by your partner or ex-partner?: No   Housing Stability: Low Risk  (11/4/2024)    Housing Stability      Do you have housing? : Yes      Are you worried about losing your housing?: No            Lab Results    Chemistry/lipid CBC  Cardiac Enzymes/BNP/TSH/INR   Lab Results   Component Value Date    CHOL 193 09/24/2024    HDL 54 09/24/2024    TRIG 85 09/24/2024    BUN 20.8 11/04/2024     11/04/2024    CO2 20 (L) 11/04/2024    Lab Results   Component Value Date    WBC 8.3 11/04/2024    HGB 12.2 (L) 11/04/2024    HCT 37.0 (L) 11/04/2024    MCV 94 11/04/2024     11/04/2024    Lab Results   Component Value Date    TROPONINI <0.01 06/23/2021    INR 0.92 05/28/2024          Cardiac Problems and Cardiac Diagnostics     Most Recent Cardiac testing:  ECG Personal interpretation  11/3/2024  NSR    ECHO 3/6/2024  Interpretation Summary     The left ventricle is normal in size.  The visual ejection fraction is 55-60%.  No regional wall motion abnormalities noted.  Diastolic Doppler findings (E/E' ratio and/or other parameters) suggest left  ventricular filling pressures are normal.  Normal right ventricle size and systolic function.  IVC diameter <2.1 cm collapsing >50% with sniff suggests a normal RA pressure  of 3 mmHg.  Sinus rhythm was noted.  There is no comparison study available.    Stress test 3/6/2024:      1.The nuclear stress test is abnormal.     2.Negative pharmacological regadenoson ECG for ischemia.     3.There is a medium sized area of transmural infarction in the mid to basal inferoseptal segment(s) of the left ventricle.  No ischemia seen.     4.The left ventricular ejection fraction at stress is 66%.     5.Stress to rest cavity ratio is 1.38.     6.The patient is at an intermediate risk of future cardiac ischemic events.     There is no prior study for comparison.         Assessment/Recommendations   Assessment:    Mr. Karen Santiago is a 89 year old male with a significant past history of HTN, COPD, CKD,  who presents for follow up.      Abnormal stress test   - Inferior fixed defect.  On my review of images perfusion improves during stress and this likely indicates diaphragmatic attenuation artifact.    - No additional  testing is needed for this.    Syncope   - Likely vasovagal related to dehydration, prolonged standing, etc.     - TTE wnl   - ECG reassuring    RTC on an as needed basis         Perez Carlos DO [facility]  Non-invasive Cardiologist  [facility]         Thank you for allowing me to participate in the care of your patient.      Sincerely,     Perez Carlos DO     [facility]  cc:   Howard Luu PA-C  [address] [place] [id number]

## 2025-01-07 NOTE — PROGRESS NOTES
[facility] HEART CARE   [address]  [place] [address]   [email or web address]   OFFICE: [phone]     CARDIOLOGY CLINIC NOTE     Thank you, Howard Collins, for asking the [facility] team to see Mr. Karen Santiago to evaluate Follow Up        History of Present Illness   Mr. Karen Santiago is a 89 year old male with a significant past history of HTN, COPD, CKD,  who presents for follow up.  He was recently in the ED for COPD exacerbation.  He otherwise has been doing well.  He has not had any additional syncopal episodes - that was likely related to dehydration.  Still doing knee exercises.  No new cardiac symptoms.  Breathing is at baseline now on new inhaler.           Medications  Allergies   Current Outpatient Medications   Medication Sig Dispense Refill    acetaminophen (TYLENOL) 325 MG tablet Take 2 tablets (650 mg) by mouth every 4 hours as needed for other (mild pain) 100 tablet 0    albuterol (PROAIR HFA/PROVENTIL HFA/VENTOLIN HFA) 108 (90 Base) MCG/ACT inhaler INHALE TWO PUFFS BY MOUTH EVERY FOUR HOURS AS NEEDED FOR WHEEZING 18 g 0    amLODIPine (NORVASC) 10 MG tablet TAKE ONE TABLET BY MOUTH ONE TIME DAILY 90 tablet 3    FLUoxetine (PROZAC) 40 MG capsule Take 1 capsule (40 mg) by mouth daily. 90 capsule 3    Fluticasone-Umeclidin-Vilant (TRELEGY ELLIPTA) 100-62.5-25 MCG/ACT oral inhaler Inhale 1 puff into the lungs daily. 60 each 11    losartan (COZAAR) 100 MG tablet TAKE ONE TABLET BY MOUTH ONE TIME DAILY 90 tablet 3    melatonin 3 MG tablet Take 3 mg by mouth at bedtime.      vitamin B-12 (CYANOCOBALAMIN) 100 MCG tablet Take 100 mcg by mouth daily (with lunch).      vitamin D3 (CHOLECALCIFEROL) 50 mcg (2000 units) tablet Take 2 tablets by mouth daily (with lunch).      mometasone-formoterol (DULERA) 100-5 MCG/ACT inhaler Inhale 2 puffs into the lungs 2 times daily. (Patient not taking: Reported on 1/7/2025) 13 g 3      No Known  "Allergies     Physical Examination Review of Systems   Vitals: BP (!) 148/60 (BP Location: Right arm, Patient Position: Sitting, Cuff Size: Adult Regular)   Pulse 62   Resp 18   Ht 1.778 m (5' 10\")   Wt 80.7 kg (178 lb)   SpO2 98%   BMI 25.54 kg/m    BMI= Body mass index is 25.54 kg/m .  Wt Readings from Last 3 Encounters:   25 80.7 kg (178 lb)   24 80.7 kg (178 lb)   24 78.9 kg (173 lb 14.4 oz)       General: pleasant male. No acute distress.   Neck: No JVD  Lungs: clear to auscultation  COR:  regular rate and rhythm, No murmurs, rubs, or gallops  Extrem: No edema        Please refer above for cardiac ROS details.       Past History     Family History: No family history on file.     Social History:   Social History     Socioeconomic History    Marital status:      Spouse name: Not on file    Number of children: Not on file    Years of education: Not on file    Highest education level: Not on file   Occupational History    Not on file   Tobacco Use    Smoking status: Former     Current packs/day: 0.00     Types: Cigarettes     Quit date: 1980     Years since quittin.0     Passive exposure: Past    Smokeless tobacco: Never   Vaping Use    Vaping status: Never Used   Substance and Sexual Activity    Alcohol use: No     Comment: Alcoholic Drinks/day: 2-3 beer per month    Drug use: No    Sexual activity: Not on file   Other Topics Concern    Not on file   Social History Narrative    Not on file     Social Drivers of Health     Financial Resource Strain: Low Risk  (2024)    Financial Resource Strain     Within the past 12 months, have you or your family members you live with been unable to get utilities (heat, electricity) when it was really needed?: No   Food Insecurity: Low Risk  (2024)    Food Insecurity     Within the past 12 months, did you worry that your food would run out before you got money to buy more?: No     Within the past 12 months, did the food you bought " just not last and you didn t have money to get more?: No   Transportation Needs: Low Risk  (11/4/2024)    Transportation Needs     Within the past 12 months, has lack of transportation kept you from medical appointments, getting your medicines, non-medical meetings or appointments, work, or from getting things that you need?: No   Physical Activity: Insufficiently Active (7/19/2022)    Received from [facility]    Exercise Vital Sign     Days of Exercise per Week: 3 days     Minutes of Exercise per Session: 20 min   Stress: Stress Concern Present (9/24/2024)    [employer] - Occupational Stress Questionnaire     Feeling of Stress : To some extent   Social Connections: Unknown (9/24/2024)    Social Connection and Isolation Panel [NHANES]     Frequency of Communication with Friends and Family: Not on file     Frequency of Social Gatherings with Friends and Family: Once a week     Attends Ep[employer] Services: Not on file     Active Member of Clubs or Organizations: Not on file     Attends Club or Organization Meetings: Not on file     Marital Status: Not on file   Interpersonal Safety: Low Risk  (9/24/2024)    Interpersonal Safety     Do you feel physically and emotionally safe where you currently live?: Yes     Within the past 12 months, have you been hit, slapped, kicked or otherwise physically hurt by someone?: No     Within the past 12 months, have you been humiliated or emotionally abused in other ways by your partner or ex-partner?: No   Housing Stability: Low Risk  (11/4/2024)    Housing Stability     Do you have housing? : Yes     Are you worried about losing your housing?: No            Lab Results    Chemistry/lipid CBC Cardiac Enzymes/BNP/TSH/INR   Lab Results   Component Value Date    CHOL 193 09/24/2024    HDL 54 09/24/2024    TRIG 85 09/24/2024    BUN 20.8 11/04/2024     11/04/2024    CO2 20 (L) 11/04/2024    Lab Results   Component Value Date    WBC 8.3 11/04/2024    HGB  12.2 (L) 11/04/2024    HCT 37.0 (L) 11/04/2024    MCV 94 11/04/2024     11/04/2024    Lab Results   Component Value Date    TROPONINI <0.01 06/23/2021    INR 0.92 05/28/2024          Cardiac Problems and Cardiac Diagnostics     Most Recent Cardiac testing:  ECG Personal interpretation  11/3/2024  NSR    ECHO 3/6/2024  Interpretation Summary     The left ventricle is normal in size.  The visual ejection fraction is 55-60%.  No regional wall motion abnormalities noted.  Diastolic Doppler findings (E/E' ratio and/or other parameters) suggest left  ventricular filling pressures are normal.  Normal right ventricle size and systolic function.  IVC diameter <2.1 cm collapsing >50% with sniff suggests a normal RA pressure  of 3 mmHg.  Sinus rhythm was noted.  There is no comparison study available.    Stress test 3/6/2024:     1.The nuclear stress test is abnormal.    2.Negative pharmacological regadenoson ECG for ischemia.    3.There is a medium sized area of transmural infarction in the mid to basal inferoseptal segment(s) of the left ventricle.  No ischemia seen.    4.The left ventricular ejection fraction at stress is 66%.    5.Stress to rest cavity ratio is 1.38.    6.The patient is at an intermediate risk of future cardiac ischemic events.    There is no prior study for comparison.         Assessment/Recommendations   Assessment:    Mr. Karen Santiago is a 89 year old male with a significant past history of HTN, COPD, CKD,  who presents for follow up.      Abnormal stress test   - Inferior fixed defect.  On my review of images perfusion improves during stress and this likely indicates diaphragmatic attenuation artifact.    - No additional testing is needed for this.    Syncope   - Likely vasovagal related to dehydration, prolonged standing, etc.     - TTE wnl   - ECG reassuring    RTC on an as needed basis         Perez Carlos DO [facility]  Non-invasive Cardiologist  [facility]

## 2025-04-05 ENCOUNTER — HOSPITAL ENCOUNTER (EMERGENCY)
Facility: CLINIC | Age: OVER 89
End: 2025-04-05
Payer: COMMERCIAL

## 2025-05-01 DIAGNOSIS — J44.1 COPD EXACERBATION (H): ICD-10-CM

## 2025-05-01 RX ORDER — ALBUTEROL SULFATE 90 UG/1
INHALANT RESPIRATORY (INHALATION)
Qty: 18 G | Refills: 1 | Status: SHIPPED | OUTPATIENT
Start: 2025-05-01

## 2025-05-02 ENCOUNTER — NURSE TRIAGE (OUTPATIENT)
Dept: FAMILY MEDICINE | Facility: CLINIC | Age: OVER 89
End: 2025-05-02
Payer: COMMERCIAL

## 2025-05-02 NOTE — TELEPHONE ENCOUNTER
"Spoke to patient and relayed provider message. Patient verbalized understanding.     Patient states he cannot go in today. States, \"I feel OK really\". Reports he has had these symptoms \"for a while\" and thinks he will \"be OK for a couple of more days\".     Advised patient to be seen in urgent care or ER if any new or worsening symptoms. Patient verbalized understanding and is in agreement with plan.     Lizbeth Ace RN  [facility]      "

## 2025-05-02 NOTE — TELEPHONE ENCOUNTER
Situation:   Patient calling with concerns of ongoing breathing sx due to COPD     Background:   Does not use oxygen at home   Only on inhalers for breathing concerns   Has had sx for about a week     Assessment:   Ongoing sx that he has been having   Cough is about the same - not going away   No chest pain   Coughing up mucus - light colored   Shortness of breath - baseline   Sometimes hard to get mucus up     Recommendation:   Due to shortness of breath writer recommends office visit, patient states he is more just bothered by cough, SOB is at his baseline. Patient thinks may need abx and steroid again.  Assisted with scheduling appointment for PCP on 5/5/25 as no in clinic appointment available today.     Routing to PCP to review if ok to wait until 5/5 and if any other recommendations in meantime        Reason for Disposition   MILD difficulty breathing (e.g., minimal/no SOB at rest, SOB with walking, pulse <100) and still present when not coughing    Additional Information   Negative: Bluish (or gray) lips or face   Negative: SEVERE difficulty breathing (e.g., struggling for each breath, speaks in single words)   Negative: Rapid onset of cough and has hives   Negative: Coughing started suddenly after medicine, an allergic food or bee sting   Negative: Difficulty breathing after exposure to flames, smoke, or fumes   Negative: Sounds like a life-threatening emergency to the triager   Negative: MODERATE difficulty breathing (e.g., speaks in phrases, SOB even at rest, pulse 100-120) and still present when not coughing   Negative: Chest pain present when not coughing   Negative: Passed out (e.g., fainted, lost consciousness, blacked out and was not responding)   Negative: Patient sounds very sick or weak to the triager    Protocols used: Cough-A-OH    ALEXANDER Gutiérrez, RN  [facility]

## 2025-05-04 ENCOUNTER — APPOINTMENT (OUTPATIENT)
Dept: RADIOLOGY | Facility: CLINIC | Age: OVER 89
End: 2025-05-04
Attending: EMERGENCY MEDICINE
Payer: COMMERCIAL

## 2025-05-04 ENCOUNTER — HOSPITAL ENCOUNTER (EMERGENCY)
Facility: CLINIC | Age: OVER 89
Discharge: HOME | End: 2025-05-04
Attending: EMERGENCY MEDICINE | Admitting: EMERGENCY MEDICINE
Payer: COMMERCIAL

## 2025-05-04 VITALS
TEMPERATURE: 97.9 F | SYSTOLIC BLOOD PRESSURE: 156 MMHG | OXYGEN SATURATION: 93 % | DIASTOLIC BLOOD PRESSURE: 75 MMHG | RESPIRATION RATE: 17 BRPM | HEART RATE: 63 BPM

## 2025-05-04 DIAGNOSIS — J44.1 COPD EXACERBATION (H): ICD-10-CM

## 2025-05-04 LAB
ANION GAP SERPL CALCULATED.3IONS-SCNC: 12 MMOL/L (ref 7–15)
ATRIAL RATE - MUSE: 68 BPM
BASOPHILS # BLD AUTO: 0 10E3/UL (ref 0–0.2)
BASOPHILS NFR BLD AUTO: 0 %
BUN SERPL-MCNC: 22.9 MG/DL (ref 8–23)
CALCIUM SERPL-MCNC: 9.7 MG/DL (ref 8.8–10.4)
CHLORIDE SERPL-SCNC: 104 MMOL/L (ref 98–107)
CREAT SERPL-MCNC: 0.86 MG/DL (ref 0.67–1.17)
DIASTOLIC BLOOD PRESSURE - MUSE: 83 MMHG
EGFRCR SERPLBLD CKD-EPI 2021: 82 ML/MIN/1.73M2
EOSINOPHIL # BLD AUTO: 0.1 10E3/UL (ref 0–0.7)
EOSINOPHIL NFR BLD AUTO: 1 %
ERYTHROCYTE [DISTWIDTH] IN BLOOD BY AUTOMATED COUNT: 14.4 % (ref 10–15)
FLUAV RNA SPEC QL NAA+PROBE: NEGATIVE
FLUBV RNA RESP QL NAA+PROBE: NEGATIVE
GLUCOSE SERPL-MCNC: 128 MG/DL (ref 70–99)
HCO3 SERPL-SCNC: 22 MMOL/L (ref 22–29)
HCT VFR BLD AUTO: 37.7 % (ref 40–53)
HGB BLD-MCNC: 12.6 G/DL (ref 13.3–17.7)
HOLD SPECIMEN: NORMAL
IMM GRANULOCYTES # BLD: 0 10E3/UL
IMM GRANULOCYTES NFR BLD: 1 %
INTERPRETATION ECG - MUSE: NORMAL
LYMPHOCYTES # BLD AUTO: 0.9 10E3/UL (ref 0.8–5.3)
LYMPHOCYTES NFR BLD AUTO: 16 %
MCH RBC QN AUTO: 31.1 PG (ref 26.5–33)
MCHC RBC AUTO-ENTMCNC: 33.4 G/DL (ref 31.5–36.5)
MCV RBC AUTO: 93 FL (ref 78–100)
MONOCYTES # BLD AUTO: 0.4 10E3/UL (ref 0–1.3)
MONOCYTES NFR BLD AUTO: 7 %
NEUTROPHILS # BLD AUTO: 4.4 10E3/UL (ref 1.6–8.3)
NEUTROPHILS NFR BLD AUTO: 76 %
NRBC # BLD AUTO: 0 10E3/UL
NRBC BLD AUTO-RTO: 0 /100
NT-PROBNP SERPL-MCNC: 184 PG/ML (ref 0–1800)
P AXIS - MUSE: 58 DEGREES
PLATELET # BLD AUTO: 192 10E3/UL (ref 150–450)
POTASSIUM SERPL-SCNC: 4.3 MMOL/L (ref 3.4–5.3)
PR INTERVAL - MUSE: 182 MS
PROCALCITONIN SERPL IA-MCNC: 0.06 NG/ML
QRS DURATION - MUSE: 104 MS
QT - MUSE: 436 MS
QTC - MUSE: 463 MS
R AXIS - MUSE: 64 DEGREES
RBC # BLD AUTO: 4.05 10E6/UL (ref 4.4–5.9)
RSV RNA SPEC NAA+PROBE: NEGATIVE
SARS-COV-2 RNA RESP QL NAA+PROBE: NEGATIVE
SODIUM SERPL-SCNC: 138 MMOL/L (ref 135–145)
SYSTOLIC BLOOD PRESSURE - MUSE: 178 MMHG
T AXIS - MUSE: 79 DEGREES
TROPONIN T SERPL HS-MCNC: 17 NG/L
VENTRICULAR RATE- MUSE: 68 BPM
WBC # BLD AUTO: 5.8 10E3/UL (ref 4–11)

## 2025-05-04 PROCEDURE — 87637 SARSCOV2&INF A&B&RSV AMP PRB: CPT | Performed by: EMERGENCY MEDICINE

## 2025-05-04 PROCEDURE — 83880 ASSAY OF NATRIURETIC PEPTIDE: CPT | Performed by: EMERGENCY MEDICINE

## 2025-05-04 PROCEDURE — 84484 ASSAY OF TROPONIN QUANT: CPT | Performed by: EMERGENCY MEDICINE

## 2025-05-04 PROCEDURE — 99285 EMERGENCY DEPT VISIT HI MDM: CPT | Mod: 25

## 2025-05-04 PROCEDURE — 82310 ASSAY OF CALCIUM: CPT | Performed by: EMERGENCY MEDICINE

## 2025-05-04 PROCEDURE — 85004 AUTOMATED DIFF WBC COUNT: CPT | Performed by: EMERGENCY MEDICINE

## 2025-05-04 PROCEDURE — 71046 X-RAY EXAM CHEST 2 VIEWS: CPT

## 2025-05-04 PROCEDURE — 36415 COLL VENOUS BLD VENIPUNCTURE: CPT | Performed by: EMERGENCY MEDICINE

## 2025-05-04 PROCEDURE — 93005 ELECTROCARDIOGRAM TRACING: CPT | Performed by: EMERGENCY MEDICINE

## 2025-05-04 PROCEDURE — 84145 PROCALCITONIN (PCT): CPT | Performed by: EMERGENCY MEDICINE

## 2025-05-04 RX ORDER — AZITHROMYCIN 250 MG/1
TABLET, FILM COATED ORAL
Qty: 6 TABLET | Refills: 0 | Status: SHIPPED | OUTPATIENT
Start: 2025-05-04 | End: 2025-05-09

## 2025-05-04 RX ORDER — PREDNISONE 20 MG/1
TABLET ORAL
Qty: 10 TABLET | Refills: 0 | Status: SHIPPED | OUTPATIENT
Start: 2025-05-04

## 2025-05-04 RX ORDER — ALBUTEROL SULFATE 0.83 MG/ML
2.5 SOLUTION RESPIRATORY (INHALATION) EVERY 6 HOURS PRN
Qty: 90 ML | Refills: 0 | Status: SHIPPED | OUTPATIENT
Start: 2025-05-04

## 2025-05-04 ASSESSMENT — ACTIVITIES OF DAILY LIVING (ADL)
ADLS_ACUITY_SCORE: 61
ADLS_ACUITY_SCORE: 61

## 2025-05-04 NOTE — ED TRIAGE NOTES
Patient states that he woke up this morning with shortness of breath and shaking. Patient has had a cough, phlegm, and has been overall tired this last week.      Triage Assessment (Adult)       Row Name 05/04/25 1233          Triage Assessment    Airway WDL WDL        Respiratory WDL    Respiratory WDL rhythm/pattern;cough     Rhythm/Pattern, Respiratory shortness of breath;dyspnea upon exertion     Cough Frequency frequent     Cough Type dry        Skin Circulation/Temperature WDL    Skin Circulation/Temperature WDL WDL        Cardiac WDL    Cardiac WDL WDL        Peripheral/Neurovascular WDL    Peripheral Neurovascular WDL WDL        Cognitive/Neuro/Behavioral WDL    Cognitive/Neuro/Behavioral WDL WDL

## 2025-05-04 NOTE — DISCHARGE INSTRUCTIONS
Laboratory workup and chest x-ray are unremarkable.  Symptoms are consistent with COPD exacerbation.  Take steroids and antibiotics as prescribed.  Nebulizer as needed.

## 2025-05-04 NOTE — ED PROVIDER NOTES
EMERGENCY DEPARTMENT ENCOUNTER      NAME: Karen Santiago  AGE: 89 year old male  YOB: 1934  MRN: [id number]  EVALUATION DATE & TIME: 2025 12:27 PM    PCP: Howard Luu    ED PROVIDER: Maria A Vinson MD    Chief Complaint   Patient presents with    Shortness of Breath         FINAL IMPRESSION:  1. COPD exacerbation (H)          ED COURSE & MEDICAL DECISION MAKIN:30 PM I met patient and performed my initial exam.   Pertinent Labs & Imaging studies reviewed. (See chart for details)  89 year old male with history of COPD, CKD, bladder cancer status post ileal conduit who presents to the Emergency Department for evaluation of 4 days of increasing cough and sputum production that had initially been responding to some Robitussin but now more shortness of breath today and had an episode of shaking chills this morning.  His exam is fairly unremarkable, vitally stable no respiratory distress.  Has some expiratory wheeze with rhonchi.  Overall favor COPD exacerbation, concern for underlying viral syndrome, pneumonia.  Less likely new onset failure, pulmonary edema, ACS.  Patient does not describe any pleuritic component to symptomatology nor does he have any PE risk factors.    Patient placed on monitor, IV established and blood obtained.  Twelve-lead EKG shows sinus rhythm.  CBC, BMP, BNP, troponin, procalcitonin unremarkable.  COVID/influenza/RSV swab negative.  Chest x-ray unremarkable.  Home with prednisone and azithromycin.        ED Course as of 25 1400   Sun May 04, 2025   1310 Hemoglobin(!): 12.6  baseline   1319 XR Chest 2 Views  X-ray independently interpreted by myself.  Surgical clips in the left chest.  No acute infiltrate, effusion       Medical Decision Making  I obtained history from Family Member/Significant Other  Discharge. I prescribed additional prescription strength medication(s) as charted. See documentation for any additional details.    MIPS (CTPE, Dental pain,  "Ramos, Sinusitis, Asthma/COPD, Head Trauma): Not Applicable    SEPSIS: None          At the conclusion of the encounter I discussed the results of all of the tests and the disposition. The questions were answered. The patient or family acknowledged understanding and was agreeable with the care plan.      MEDICATIONS GIVEN IN THE EMERGENCY:  Medications - No data to display    NEW PRESCRIPTIONS STARTED AT TODAY'S ER VISIT  New Prescriptions    ALBUTEROL (PROVENTIL) (2.5 MG/3ML) 0.083% NEB SOLUTION    Take 1 vial (2.5 mg) by nebulization every 6 hours as needed for shortness of breath, wheezing or cough.    AZITHROMYCIN (ZITHROMAX) 250 MG TABLET    Take 2 tablets (500 mg) by mouth daily for 1 day, THEN 1 tablet (250 mg) daily for 4 days.    PREDNISONE (DELTASONE) 20 MG TABLET    Take two tablets (= 40mg) each day for 5 (five) days          =================================================================    HPI    Patient information was obtained from: Patient     Use of Intrepreter: N/A     Karen Santiago is a 89 year old male with pertinent medical history of Cancer, COPD, HTN, CKD, and acute hypoxemic respiratory failure, and rheumatoid arthritis who presents to the ED for evaluation of COPD diffculty.     Per chart review, patient was seen at [facility] on 01/07/25 for a follow-up cardiology visit. Patient was prescribed with a new inhaler.     Per chart review, patient called the [facility] triage nurse line due to ongoing production of light color mucous and shortness of breath. The shortness of breath at that time was at baseline. Patient has been taking his prescribed antibiotic and steroid. He was recommend a primary visit, which ia schedule for tomorrow. Patient was told to come into the ED or UC  if the shortness of breath got worse.      Patient reports waking up wit shortness of breath and \"shaking\". He was not feeling well either. Patient endorses ongoing cough for the " past couple of days along with increased sputum. Patient lives by himself and called his family members to bring him in. He has an appointment with his primary provider tomorrow. Patient took tylenol this morning for symptom management. Denies any pain. No other complaints at this time.     PAST MEDICAL HISTORY:  Past Medical History:   Diagnosis Date    Cancer (H)     bladder    COPD (chronic obstructive pulmonary disease) (H)     Hypertension     Rheumatoid arthritis (H)        PAST SURGICAL HISTORY:  Past Surgical History:   Procedure Laterality Date    ARTHROPLASTY KNEE Right 3/20/2024    Procedure: RIGHT TOTAL KNEE ARTHROPLASTY;  Surgeon: Kishor Mckeon MD;  Location: [facility] Main OR    HC REMOVAL TESTIS,SIMPLE      Description: Orchiectomy Left;  Recorded: 04/26/2010;  Comments: for benign interstitial tumor    [facility] CYSTECTOMY,ILEAL CONDUIT/SIGMOID BLADDER      Description: Complete Bladder Cystectomy With Ureteroileal Conduit;  Proc Date: 01/01/2002;  Comments: cystoprostatectomy for squamous cell carcinoma per [facility] REMV PROSTATE,RETROPUBIC,SUBTOTAL      Description: Prostatectomy, Retropubic;  Recorded: 05/19/2009;  Comments: 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 Taking?   FLUoxetine (PROZAC) 40 MG capsule   No No   Sig: Take 1 capsule (40 mg) by mouth daily.   Fluticasone-Umeclidin-Vilant (TRELEGY ELLIPTA) 100-62.5-25 MCG/ACT oral inhaler   No No   Sig: Inhale 1 puff into the lungs daily.   acetaminophen (TYLENOL) 325 MG tablet   No No   Sig: Take 2 tablets (650 mg) by mouth every 4 hours as needed for other (mild pain)   albuterol (PROAIR HFA/PROVENTIL  HFA/VENTOLIN HFA) 108 (90 Base) MCG/ACT inhaler   No No   Sig: INHALE TWO PUFFS BY MOUTH EVERY FOUR HOURS AS NEEDED FOR WHEEZING   albuterol (PROVENTIL) (2.5 MG/3ML) 0.083% neb solution   No No   Sig: Take 2 vials (5 mg) by nebulization every 4 hours as needed for shortness of breath or wheezing   amLODIPine (NORVASC) 10 MG tablet   No No   Sig: TAKE ONE TABLET BY MOUTH ONE TIME DAILY   losartan (COZAAR) 100 MG tablet   No No   Sig: TAKE ONE TABLET BY MOUTH ONE TIME DAILY   melatonin 3 MG tablet   Yes No   Sig: Take 3 mg by mouth at bedtime.   vitamin B-12 (CYANOCOBALAMIN) 100 MCG tablet   Yes No   Sig: Take 100 mcg by mouth daily (with lunch).   vitamin D3 (CHOLECALCIFEROL) 50 mcg (2000 units) tablet   Yes No   Sig: Take 2 tablets by mouth daily (with lunch).      Facility-Administered Medications: None       ALLERGIES:  No Known Allergies    FAMILY HISTORY:  No family history on file.    SOCIAL HISTORY:  Social History     Tobacco Use    Smoking status: Former     Current packs/day: 0.00     Types: Cigarettes     Quit date: 1980     Years since quittin.3     Passive exposure: Past    Smokeless tobacco: Never   Vaping Use    Vaping status: Never Used   Substance Use Topics    Alcohol use: No     Comment: Alcoholic Drinks/day: 2-3 beer per month    Drug use: No        VITALS:  Patient Vitals for the past 24 hrs:   BP Temp Temp src Pulse Resp SpO2   25 1345 137/67 -- -- 63 18 93 %   25 1330 (!) 149/76 -- -- 63 18 93 %   25 1315 (!) 148/77 -- -- 63 19 93 %   25 1300 (!) 140/72 -- -- 64 19 94 %   25 1245 (!) 145/74 -- -- 66 21 93 %   25 1231 (!) 178/83 97.9  F (36.6  C) Oral 77 21 93 %       PHYSICAL EXAM    General Appearance: Well-appearing, well-nourished, no acute distress. Elderly appearing.  Head:  Normocephalic  Chest:  No tenderness or deformity, no crepitus  Cardio:  Regular rate and rhythm, no murmur/gallop/rub  Pulm:  No respiratory distress. Expiratory wheezing  with rhonchi    Extremities:  Extremities normal, atraumatic, no cyanosis or edema, full ROM and motor tone intact, bilateral pulses intact upper and lower  Neuro:  Alert and oriented ×3     RADIOLOGY/LABS:  Reviewed all pertinent imaging. Please see official radiology report. All pertinent labs reviewed and interpreted.    Results for orders placed or performed during the hospital encounter of 05/04/25   XR Chest 2 Views    Impression    IMPRESSION: Patchy bibasilar atelectasis and/or scarring. Fiducial marker is a nodular opacity in the left lung base are unchanged. No effusions or pneumothorax. Heart size is stable.   Basic metabolic panel   Result Value Ref Range    Sodium 138 135 - 145 mmol/L    Potassium 4.3 3.4 - 5.3 mmol/L    Chloride 104 98 - 107 mmol/L    Carbon Dioxide (CO2) 22 22 - 29 mmol/L    Anion Gap 12 7 - 15 mmol/L    Urea Nitrogen 22.9 8.0 - 23.0 mg/dL    Creatinine 0.86 0.67 - 1.17 mg/dL    GFR Estimate 82 >60 mL/min/1.73m2    Calcium 9.7 8.8 - 10.4 mg/dL    Glucose 128 (H) 70 - 99 mg/dL   Nt probnp inpatient   Result Value Ref Range    N terminal Pro BNP Inpatient 184 0 - 1,800 pg/mL   Result Value Ref Range    Procalcitonin 0.06 <0.50 ng/mL   Influenza A/B, RSV and SARS-CoV2 PCR (COVID-19) Nasopharyngeal    Specimen: Nasopharyngeal; Swab   Result Value Ref Range    Influenza A PCR Negative Negative    Influenza B PCR Negative Negative    RSV PCR Negative Negative    SARS CoV2 PCR Negative Negative   Result Value Ref Range    Troponin T, High Sensitivity 17 <=22 ng/L   CBC with platelets and differential   Result Value Ref Range    WBC Count 5.8 4.0 - 11.0 10e3/uL    RBC Count 4.05 (L) 4.40 - 5.90 10e6/uL    Hemoglobin 12.6 (L) 13.3 - 17.7 g/dL    Hematocrit 37.7 (L) 40.0 - 53.0 %    MCV 93 78 - 100 fL    MCH 31.1 26.5 - 33.0 pg    MCHC 33.4 31.5 - 36.5 g/dL    RDW 14.4 10.0 - 15.0 %    Platelet Count 192 150 - 450 10e3/uL    % Neutrophils 76 %    % Lymphocytes 16 %    % Monocytes 7 %    %  Eosinophils 1 %    % Basophils 0 %    % Immature Granulocytes 1 %    NRBCs per 100 WBC 0 <1 /100    Absolute Neutrophils 4.4 1.6 - 8.3 10e3/uL    Absolute Lymphocytes 0.9 0.8 - 5.3 10e3/uL    Absolute Monocytes 0.4 0.0 - 1.3 10e3/uL    Absolute Eosinophils 0.1 0.0 - 0.7 10e3/uL    Absolute Basophils 0.0 0.0 - 0.2 10e3/uL    Absolute Immature Granulocytes 0.0 <=0.4 10e3/uL    Absolute NRBCs 0.0 10e3/uL   ECG 12-LEAD WITH MUSE (LHE)   Result Value Ref Range    Systolic Blood Pressure 178 mmHg    Diastolic Blood Pressure 83 mmHg    Ventricular Rate 68 BPM    Atrial Rate 68 BPM    OK Interval 182 ms    QRS Duration 104 ms     ms    QTc 463 ms    P Axis 58 degrees    R AXIS 64 degrees    T Axis 79 degrees    Interpretation ECG       Sinus rhythm  Normal ECG  When compared with ECG of 03-Nov-2024 17:04,  No significant change was found  Confirmed by SEE ED PROVIDER NOTE FOR, ECG INTERPRETATION ([id number]),  Omaira Zaman ([id number]) on 5/4/2025 12:50:01 PM         EKG:  Performed at: 13:38 PM     Impression: Sinus rhythm    Rate: 68 BPM  Rhythm: Sinus rhythm  Axis: 64  OK Interval: 182 ms  QRS Interval: 104 ms  QTc Interval: 463 ms  ST Changes: None  Comparison: When compared with ECG of 03-Nov-2024, no significant change was found.   I have independently reviewed and interpreted the EKG(s) documented above.      The creation of this record is based on the scribe s observations of the work being performed by Maria A Vinson MD and the provider s statements to them. It was created on her behalf by Jadyn Salamanca, a trained medical scribe. This document has been checked and approved by the attending provider.    Maria A Vinson MD  Emergency Medicine  [facility] EMERGENCY ROOM  [address] [address] [phone]  Dept: [phone]     Maria A Vinson MD  05/04/25 1400

## 2025-05-12 ENCOUNTER — OFFICE VISIT (OUTPATIENT)
Dept: FAMILY MEDICINE | Facility: CLINIC | Age: OVER 89
End: 2025-05-12
Payer: COMMERCIAL

## 2025-05-12 VITALS
RESPIRATION RATE: 18 BRPM | WEIGHT: 184 LBS | SYSTOLIC BLOOD PRESSURE: 130 MMHG | TEMPERATURE: 98 F | BODY MASS INDEX: 26.34 KG/M2 | HEART RATE: 82 BPM | OXYGEN SATURATION: 95 % | DIASTOLIC BLOOD PRESSURE: 64 MMHG | HEIGHT: 70 IN

## 2025-05-12 DIAGNOSIS — J44.1 COPD EXACERBATION (H): Primary | ICD-10-CM

## 2025-05-12 DIAGNOSIS — J43.8 OTHER EMPHYSEMA (H): ICD-10-CM

## 2025-05-12 DIAGNOSIS — R25.1 TREMOR: ICD-10-CM

## 2025-05-12 PROCEDURE — 3075F SYST BP GE 130 - 139MM HG: CPT | Performed by: PHYSICIAN ASSISTANT

## 2025-05-12 PROCEDURE — G2211 COMPLEX E/M VISIT ADD ON: HCPCS | Performed by: PHYSICIAN ASSISTANT

## 2025-05-12 PROCEDURE — 99214 OFFICE O/P EST MOD 30 MIN: CPT | Performed by: PHYSICIAN ASSISTANT

## 2025-05-12 PROCEDURE — 3078F DIAST BP <80 MM HG: CPT | Performed by: PHYSICIAN ASSISTANT

## 2025-05-12 RX ORDER — PROPRANOLOL HCL 20 MG
20 TABLET ORAL DAILY
Qty: 90 TABLET | Refills: 3 | Status: SHIPPED | OUTPATIENT
Start: 2025-05-12

## 2025-05-12 RX ORDER — IPRATROPIUM BROMIDE AND ALBUTEROL SULFATE 2.5; .5 MG/3ML; MG/3ML
1 SOLUTION RESPIRATORY (INHALATION) 2 TIMES DAILY
Qty: 90 ML | Refills: 3 | Status: SHIPPED | OUTPATIENT
Start: 2025-05-12

## 2025-05-12 ASSESSMENT — ENCOUNTER SYMPTOMS
VOMITING: 0
DYSURIA: 0
SORE THROAT: 0
ARTHRALGIAS: 0
TREMORS: 1
EYE PAIN: 0
COLOR CHANGE: 0
SEIZURES: 0
FEVER: 0
PALPITATIONS: 0
ABDOMINAL PAIN: 0
HEMATURIA: 0
COUGH: 0
BACK PAIN: 0
CHILLS: 0
SHORTNESS OF BREATH: 0

## 2025-05-12 ASSESSMENT — ANXIETY QUESTIONNAIRES
GAD7 TOTAL SCORE: 11
3. WORRYING TOO MUCH ABOUT DIFFERENT THINGS: NEARLY EVERY DAY
5. BEING SO RESTLESS THAT IT IS HARD TO SIT STILL: MORE THAN HALF THE DAYS
7. FEELING AFRAID AS IF SOMETHING AWFUL MIGHT HAPPEN: NOT AT ALL
6. BECOMING EASILY ANNOYED OR IRRITABLE: NOT AT ALL
1. FEELING NERVOUS, ANXIOUS, OR ON EDGE: NEARLY EVERY DAY
IF YOU CHECKED OFF ANY PROBLEMS ON THIS QUESTIONNAIRE, HOW DIFFICULT HAVE THESE PROBLEMS MADE IT FOR YOU TO DO YOUR WORK, TAKE CARE OF THINGS AT HOME, OR GET ALONG WITH OTHER PEOPLE: NOT DIFFICULT AT ALL
4. TROUBLE RELAXING: NOT AT ALL
2. NOT BEING ABLE TO STOP OR CONTROL WORRYING: NEARLY EVERY DAY
7. FEELING AFRAID AS IF SOMETHING AWFUL MIGHT HAPPEN: NOT AT ALL
8. IF YOU CHECKED OFF ANY PROBLEMS, HOW DIFFICULT HAVE THESE MADE IT FOR YOU TO DO YOUR WORK, TAKE CARE OF THINGS AT HOME, OR GET ALONG WITH OTHER PEOPLE?: NOT DIFFICULT AT ALL
GAD7 TOTAL SCORE: 11
GAD7 TOTAL SCORE: 11

## 2025-05-12 ASSESSMENT — PATIENT HEALTH QUESTIONNAIRE - PHQ9
10. IF YOU CHECKED OFF ANY PROBLEMS, HOW DIFFICULT HAVE THESE PROBLEMS MADE IT FOR YOU TO DO YOUR WORK, TAKE CARE OF THINGS AT HOME, OR GET ALONG WITH OTHER PEOPLE: SOMEWHAT DIFFICULT
SUM OF ALL RESPONSES TO PHQ QUESTIONS 1-9: 10
SUM OF ALL RESPONSES TO PHQ QUESTIONS 1-9: 10

## 2025-05-12 NOTE — PROGRESS NOTES
Assessment & Plan     COPD exacerbation (H)  Recently seen in the emergency department on 5/4 for COPD exacerbation.  He was placed on prednisone and azithromycin.  Since completing both the prednisone and azithromycin he is back to baseline.  He has had 3 ER visits and 1 hospitalization for COPD exacerbation in the last year.  Due to his ER visits and COPD exacerbations recommended PFT and pulmonology follow-up but he declined.    Other emphysema (H)  Continues to have COPD exacerbations.  Seen in the emergency department 5/4 and is back to baseline.  He has had 3 ER visits and 1 hospitalization for COPD exacerbation in the last year.  Recommended PFT and pulmonology follow-up but rec declines.  Will have him continue Trelegy.  I will have him start doing DuoNebs 2-3 times a day.  Continue to monitor at this time  - ipratropium - albuterol 0.5 mg/2.5 mg/3 mL (DUONEB) 0.5-2.5 (3) MG/3ML neb solution; Take 1 vial (3 mLs) by nebulization 2 times daily.    Tremor  He is having tremor in his upper extremities.  Mostly with doing certain activities.  Worse if he is reaching for something or trying to write.  Fine motor movements tend to make the tremor worse.  No tremor to the lower extremities.  He thought that the tremor was due to anxiety but he has been on Prozac 40 mg and there has not been any improvement  and the tremor.  He is not having any other anxiety symptoms.  I suspect that this is a essential tremor.  No cogwheeling or resting tremor today.  We will trial propranolol 20 mg daily to see if this helps with the essential tremor.  Recommended following up in 1 month.  Could consider neurology follow-up if symptoms are not better.  - propranolol (INDERAL) 20 MG tablet; Take 1 tablet (20 mg) by mouth daily.    Total time spent was 35 minutes including reviewing records prior to arrival, consultation, placing orders, education, and reviewing the plan of care on the date of service.    The longitudinal plan of  care for the diagnosis(es)/condition(s) as documented were addressed during this visit. Due to the added complexity in care, I will continue to support Karen in the subsequent management and with ongoing continuity of care.        Depression Screening Follow Up        5/12/2025     9:52 AM   PHQ   PHQ-9 Total Score 10    Q9: Thoughts of better off dead/self-harm past 2 weeks Not at all        Proxy-reported       Subjective   Karen is a 89 year old, presenting for the following health issues:  RECHECK (Chronic cough/ COPD follow up. Also, mental health/ anxiety has been terrible. Meds not working well. Currently on Prozac 40 mg and does not seem to help/making things work.)        5/12/2025     9:43 AM   Additional Questions   Roomed by Zuleyka Sheehan   Accompanied by dustin     Ryan is a 9-year-old male presents to the clinic today for ER follow-up.  Seen in the emergency department on 5/4 for COPD exacerbation.  He was having coughing and increased mucus production for a few days prior to going into the ER.  In the ER he underwent labs and a chest x-ray.  Labs were stable.  Chest x-ray did not show any evidence of pneumonia.  He was treated for a COPD exacerbation with prednisone and azithromycin.  Since completing the prednisone and azithromycin he is back to his baseline.  He has not been having any new or worsening symptoms.  He has been taking his Trelegy daily and using albuterol nebulizers as needed.    He continues to have what he describes as anxiety.  He states he is very shaky in his upper extremities.  He is not having any tremors to his lower extremities.  He states that the shakiness is worse if he is going to do something with his hands especially writing or reaching for something.  he does not note any cogwheeling or resting tremor.  He is not having any other symptoms of anxiety as far as racing thoughts, difficulty falling asleep or staying asleep, or racing heart.            Review of Systems  "  Constitutional:  Negative for chills and fever.   HENT:  Negative for ear pain and sore throat.    Eyes:  Negative for pain and visual disturbance.   Respiratory:  Negative for cough and shortness of breath.    Cardiovascular:  Negative for chest pain and palpitations.   Gastrointestinal:  Negative for abdominal pain and vomiting.   Genitourinary:  Negative for dysuria and hematuria.   Musculoskeletal:  Negative for arthralgias and back pain.   Skin:  Negative for color change and rash.   Neurological:  Positive for tremors. Negative for seizures and syncope.   All other systems reviewed and are negative.          Objective    /64 (BP Location: Right arm, Patient Position: Sitting, Cuff Size: Adult Regular)   Pulse 82   Temp 98  F (36.7  C) (Oral)   Resp 18   Ht 1.778 m (5' 10\")   Wt 83.5 kg (184 lb)   SpO2 95%   BMI 26.40 kg/m    Body mass index is 26.4 kg/m .  Physical Exam  Vitals and nursing note reviewed.   Constitutional:       General: He is not in acute distress.     Appearance: Normal appearance. He is not ill-appearing, toxic-appearing or diaphoretic.   Eyes:      Conjunctiva/sclera: Conjunctivae normal.   Cardiovascular:      Rate and Rhythm: Normal rate and regular rhythm.      Heart sounds: No murmur heard.     No friction rub. No gallop.   Pulmonary:      Effort: Pulmonary effort is normal. No accessory muscle usage, prolonged expiration or respiratory distress.      Breath sounds: Decreased breath sounds present. No wheezing, rhonchi or rales.   Skin:     General: Skin is warm and dry.   Neurological:      Mental Status: He is alert.      Cranial Nerves: Cranial nerves 2-12 are intact.      Motor: Tremor (Hands b/l) present.      Coordination: Coordination is intact.      Gait: Gait is intact.   Psychiatric:         Mood and Affect: Mood normal.         Behavior: Behavior normal.         Thought Content: Thought content normal.         Judgment: Judgment normal.                Signed " Electronically by: Howard Luu PA-C    .undefined[^^

## 2025-06-18 ENCOUNTER — OFFICE VISIT (OUTPATIENT)
Dept: FAMILY MEDICINE | Facility: CLINIC | Age: OVER 89
End: 2025-06-18
Payer: COMMERCIAL

## 2025-06-18 VITALS
HEART RATE: 64 BPM | BODY MASS INDEX: 26.34 KG/M2 | OXYGEN SATURATION: 94 % | HEIGHT: 70 IN | RESPIRATION RATE: 16 BRPM | SYSTOLIC BLOOD PRESSURE: 110 MMHG | WEIGHT: 184 LBS | DIASTOLIC BLOOD PRESSURE: 58 MMHG | TEMPERATURE: 97.8 F

## 2025-06-18 DIAGNOSIS — C34.12 MALIGNANT NEOPLASM OF UPPER LOBE OF LEFT LUNG (H): ICD-10-CM

## 2025-06-18 DIAGNOSIS — I10 ESSENTIAL HYPERTENSION, BENIGN: Primary | ICD-10-CM

## 2025-06-18 DIAGNOSIS — I25.83 CORONARY ARTERY DISEASE DUE TO LIPID RICH PLAQUE: ICD-10-CM

## 2025-06-18 DIAGNOSIS — N18.31 CHRONIC KIDNEY DISEASE, STAGE 3A (H): ICD-10-CM

## 2025-06-18 DIAGNOSIS — I25.10 CORONARY ARTERY DISEASE DUE TO LIPID RICH PLAQUE: ICD-10-CM

## 2025-06-18 DIAGNOSIS — F41.9 ANXIETY: ICD-10-CM

## 2025-06-18 DIAGNOSIS — R25.1 TREMOR: ICD-10-CM

## 2025-06-18 DIAGNOSIS — J43.9 PULMONARY EMPHYSEMA, UNSPECIFIED EMPHYSEMA TYPE (H): ICD-10-CM

## 2025-06-18 PROBLEM — J96.01 ACUTE HYPOXEMIC RESPIRATORY FAILURE (H): Status: RESOLVED | Noted: 2024-11-03 | Resolved: 2025-06-18

## 2025-06-18 PROCEDURE — 3078F DIAST BP <80 MM HG: CPT | Performed by: PHYSICIAN ASSISTANT

## 2025-06-18 PROCEDURE — G2211 COMPLEX E/M VISIT ADD ON: HCPCS | Performed by: PHYSICIAN ASSISTANT

## 2025-06-18 PROCEDURE — 99214 OFFICE O/P EST MOD 30 MIN: CPT | Performed by: PHYSICIAN ASSISTANT

## 2025-06-18 PROCEDURE — 3074F SYST BP LT 130 MM HG: CPT | Performed by: PHYSICIAN ASSISTANT

## 2025-06-18 NOTE — PROGRESS NOTES
Assessment & Plan     Essential hypertension, benign  Well-controlled at 110/52.  Blood pressure has dropped a bit since adding propranolol 20 mg.  Will continue his current antihypertension medications at this time    Coronary artery disease due to lipid rich plaque  Asymptomatic at this time.  CT chest 11/2024 noted moderate coronary artery disease.  Last lipid panel as of 9/24 stable with an , triglyceride 85, HDL of 54.  He is to continue to watch diet and stay active.  Due to his age will hold off on statin therapy.  Recent Labs   Lab Test 09/24/24  1106 03/12/24  1053   CHOL 193 167   HDL 54 39*   * 107*   TRIG 85 103     Chronic kidney disease, stage 3a (H)  Chronic kidney disease.  BMPs have been stable.  Blood pressure is much better today at 110/58.  He is to avoid nephrotoxic medications including NSAIDs    Pulmonary emphysema, unspecified emphysema type (H)  Currently on trilogy, DuoNebs 2 times a day and albuterol as needed.  He has had multiple ER visits and hospitalizations due to COPD exacerbation.  Last ER visit was 5/4/2025.  We added the DuoNebs 2 times a day during his last visit with me on 5/12/2025 and his breathing has been much better.  He will continue Trelegy, DuoNebs 2 times a day and albuterol as needed    Tremor  Started him on propranolol 20 mg daily 5/12/2025.  States that his tremor is about 50% better and he is happy with the improvement.  He has had slight decrease in his blood pressure so we will continue propranolol 20 mg at this time.  He does have a Medicare annual wellness visit in September and we can follow-up on things at that time    Anxiety  On questioning some anxiety.  He states that he does not feel overly anxious or excessive worrying.  He thought his tremor was due to anxiety so we have been on Prozac for about a year.  He would like to try to decrease the Prozac to 20 mg since he has not really noticed any improvement.  I think this would be  "reasonable.  Could consider discontinuing this in September during his Medicare annual wellness visit if he is doing well on the lower dose.  He is to update me if things worsen  - FLUoxetine (PROZAC) 20 MG capsule; Take 1 capsule (20 mg) by mouth daily.    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  Was seeing [facility].  Last CT chest 5/28/2025 did not show any reoccurrence    The longitudinal plan of care for the diagnosis(es)/condition(s) as documented were addressed during this visit. Due to the added complexity in care, I will continue to support Karen in the subsequent management and with ongoing continuity of care.    Total time spent was 28 minutes including reviewing records prior to arrival, consultation, placing orders, education, and reviewing the plan of care on the date of service.    BMI  Estimated body mass index is 26.4 kg/m  as calculated from the following:    Height as of this encounter: 1.778 m (5' 10\").    Weight as of this encounter: 83.5 kg (184 lb).   Weight management plan: Discussed healthy diet and exercise guidelines      Follow-up    Follow-up Visit   Expected date:  Sep 18, 2025 (Approximate)      Follow Up Appointment Details:     Follow-up with whom?: Me    Follow-Up for what?: Medicare Wellness    Any Additional Chronic Condition Management?: General (Other)    Welcome or Annual?: Annual Wellness    How?: In Person                 Subjective   Karen is a 89 year old, presenting for the following health issues:  Recheck Medication (COPD follow up from ER visit in May 2025 for COPD exacerbation. Pt reports that since he started Propranolol, his tremor has gotten better. )        6/18/2025     2:03 PM   Additional Questions   Roomed by Zuleyka Sheehan   Accompanied by none     Is a 9-year-old male presents to the clinic today for a 1 month follow-up.  He was seen on last month for evaluation on anxiety, tremor, and COPD.  He had recently been in " "the ER for a COPD exacerbation on 5/4/2025.  He was treated with oral prednisone and symptoms had improved.  He continues on trilogy, albuterol, and at his last appointment we did add DuoNebs 2 times a day.  He states that since he has added the DuoNebs his breathing has been much better.  He has not had any recent hospitalizations or ER visits.  We also started him on propranolol 20 mg daily for anxiety first and upper extremity tremor.  He was started on Prozac about a year ago thinking that his shakiness/tremor was due to anxiety.  He states he does not feel that he is overly anxious or is constantly worrying about things.  Since has been on the propranolol 20 mg his tremor is about 50% better.  Continues on Prozac 40 mg daily but he is wanting to try to decrease the Prozac as he does not feel that the Prozac gave any improvement in his symptoms.    History of Present Illness       COPD:  He presents for follow up of COPD.  Overall, COPD symptoms are stable since last visit.  He has same as usual fatigue or shortness of breath with exertion and no shortness of breath at rest.  He rarely coughs and does not have change in sputum. No recent fever. He can walk 2-5 blocks without stopping to rest. He can walk 3 or more flights of stairs without resting. The patient has had no ED, urgent care, or hospital admissions because of COPD since the last visit.     He eats 2-3 servings of fruits and vegetables daily.He consumes 0 sweetened beverage(s) daily.He exercises with enough effort to increase his heart rate 20 to 29 minutes per day.  He exercises with enough effort to increase his heart rate 6 days per week.   He is taking medications regularly.                Objective    /58 (BP Location: Left arm, Patient Position: Sitting, Cuff Size: Adult Regular)   Pulse 64   Temp 97.8  F (36.6  C) (Oral)   Resp 16   Ht 1.778 m (5' 10\")   Wt 83.5 kg (184 lb)   SpO2 94%   BMI 26.40 kg/m    Body mass index is 26.4 " kg/m .  Physical Exam  Vitals and nursing note reviewed.   Constitutional:       Appearance: Normal appearance.   Cardiovascular:      Rate and Rhythm: Normal rate and regular rhythm.      Heart sounds: No murmur heard.     No friction rub. No gallop.   Pulmonary:      Effort: Pulmonary effort is normal. No accessory muscle usage, prolonged expiration or respiratory distress.      Breath sounds: Decreased breath sounds present. No wheezing, rhonchi or rales.   Neurological:      General: No focal deficit present.      Mental Status: He is alert and oriented to person, place, and time.   Psychiatric:         Mood and Affect: Mood normal.         Behavior: Behavior normal.         Thought Content: Thought content normal.         Judgment: Judgment normal.                Signed Electronically by: Howard Luu PA-C

## 2025-06-25 DIAGNOSIS — I10 ESSENTIAL HYPERTENSION, BENIGN: ICD-10-CM

## 2025-06-25 RX ORDER — AMLODIPINE BESYLATE 10 MG/1
10 TABLET ORAL DAILY
Qty: 90 TABLET | Refills: 2 | Status: SHIPPED | OUTPATIENT
Start: 2025-06-25

## 2025-06-25 RX ORDER — LOSARTAN POTASSIUM 100 MG/1
100 TABLET ORAL DAILY
Qty: 90 TABLET | Refills: 2 | Status: SHIPPED | OUTPATIENT
Start: 2025-06-25

## 2025-07-09 ENCOUNTER — OFFICE VISIT (OUTPATIENT)
Dept: FAMILY MEDICINE | Facility: CLINIC | Age: OVER 89
End: 2025-07-09
Payer: COMMERCIAL

## 2025-07-09 VITALS
BODY MASS INDEX: 26.34 KG/M2 | RESPIRATION RATE: 16 BRPM | DIASTOLIC BLOOD PRESSURE: 58 MMHG | SYSTOLIC BLOOD PRESSURE: 110 MMHG | TEMPERATURE: 97.8 F | HEART RATE: 67 BPM | OXYGEN SATURATION: 93 % | WEIGHT: 184 LBS | HEIGHT: 70 IN

## 2025-07-09 DIAGNOSIS — J44.1 COPD EXACERBATION (H): Primary | ICD-10-CM

## 2025-07-09 PROCEDURE — 3078F DIAST BP <80 MM HG: CPT | Performed by: PHYSICIAN ASSISTANT

## 2025-07-09 PROCEDURE — 3074F SYST BP LT 130 MM HG: CPT | Performed by: PHYSICIAN ASSISTANT

## 2025-07-09 PROCEDURE — 99214 OFFICE O/P EST MOD 30 MIN: CPT | Performed by: PHYSICIAN ASSISTANT

## 2025-07-09 RX ORDER — PREDNISONE 20 MG/1
20 TABLET ORAL DAILY
Qty: 5 TABLET | Refills: 0 | Status: SHIPPED | OUTPATIENT
Start: 2025-07-09 | End: 2025-07-14

## 2025-07-09 RX ORDER — DOXYCYCLINE 100 MG/1
100 CAPSULE ORAL 2 TIMES DAILY
Qty: 10 CAPSULE | Refills: 0 | Status: SHIPPED | OUTPATIENT
Start: 2025-07-09 | End: 2025-07-14

## 2025-07-09 ASSESSMENT — ENCOUNTER SYMPTOMS
PALPITATIONS: 0
FEVER: 0
COUGH: 1
DYSURIA: 0
VOMITING: 0
DIZZINESS: 0
COLOR CHANGE: 0
SHORTNESS OF BREATH: 1
EYE PAIN: 0
BACK PAIN: 0
SEIZURES: 0
HEMATURIA: 0
ABDOMINAL PAIN: 0
CHILLS: 0
SORE THROAT: 0
ARTHRALGIAS: 0

## 2025-07-09 NOTE — PROGRESS NOTES
Assessment & Plan     COPD exacerbation (H)  Cough, increased mucus production and shortness of breath.  Symptoms started yesterday.  Tends to develop a COPD exacerbation in the spring and the fall.  Fevers and chills.  Eating and drinking well.  Vitals are stable today.  Oxygen sats are 93% on room air.  He does not appear to be in any respiratory distress today.  No chest pain, lightheaded, dizziness.  Last ER visit was in May for COPD exacerbation.  He has been doing DuoNebs 4 times a day, Trelegy and albuterol as needed.  With increased cough, mucus production and shortness of breath suspect early COPD exacerbation.  Mild cough throughout lung fields.  No wheezing.  Will start him on prednisone 20 mg x 5 days along with doxycycline x 5 days.  Will also order him a Acapella device to use daily to help with respiratory secretion clearance.  He is to continue with Trelegy and albuterol up to 4 times a day.  If symptoms worsen or do not improve he has to follow-up for evaluation.  Patient agreed this plan.  Questions were answered  - predniSONE (DELTASONE) 20 MG tablet; Take 1 tablet (20 mg) by mouth daily for 5 days.  - Other Respiratory Supplies for DME - ONLY FOR DME  - doxycycline hyclate (VIBRAMYCIN) 100 MG capsule; Take 1 capsule (100 mg) by mouth 2 times daily for 5 days.      Samara Jones is a 89 year old, presenting for the following health issues:  Copd Exacerbation (Chronic cough related to COPD flare ups. Still unable to sleep at night because cough is keeping him up and unable to cough mucous up)        7/9/2025     3:02 PM   Additional Questions   Roomed by Zuleyka Sheehan   Accompanied by none     89-year-old male presents to the clinic today for evaluation of cough, increased mucus production and shortness of breath.  Symptoms started yesterday.  History of COPD.  Tends to get COPD exacerbations in the spring and the fall.  He has not been having any fevers or chills.  He is feeling well  "otherwise.  Home O2 was in the 90s.  He has been doing DuoNebs 4 times a day continuing with Trelegy and albuterol as needed.  Denies any chest pain, lightheaded, dizziness.  Eating and drinking well.    History of Present Illness       COPD:  He presents for follow up of COPD.   Overall, COPD symptoms are slightly worse since last visit. He has more than usual fatigue or shortness of breath with exertion and more than usual shortness of breath at rest.  He often coughs and does have change in sputum. No recent fever. He can walk the length of 1-2 rooms without stopping to rest. He can walk 1 flights of stairs without resting. The patient has had no ED, urgent care, or hospital admissions because of COPD since the last visit.     He eats 0-1 servings of fruits and vegetables daily.He consumes 0 sweetened beverage(s) daily.He exercises with enough effort to increase his heart rate 9 or less minutes per day.  He exercises with enough effort to increase his heart rate 3 or less days per week.   He is taking medications regularly.          Review of Systems   Constitutional:  Negative for chills and fever.   HENT:  Negative for ear pain and sore throat.    Eyes:  Negative for pain and visual disturbance.   Respiratory:  Positive for cough and shortness of breath.    Cardiovascular:  Negative for chest pain and palpitations.   Gastrointestinal:  Negative for abdominal pain and vomiting.   Genitourinary:  Negative for dysuria and hematuria.   Musculoskeletal:  Negative for arthralgias and back pain.   Skin:  Negative for color change and rash.   Neurological:  Negative for dizziness, seizures and syncope.   All other systems reviewed and are negative.          Objective    /58 (BP Location: Left arm, Patient Position: Sitting, Cuff Size: Adult Regular)   Pulse 67   Temp 97.8  F (36.6  C) (Oral)   Resp 16   Ht 1.778 m (5' 10\")   Wt 83.5 kg (184 lb)   SpO2 93%   BMI 26.40 kg/m    Body mass index is 26.4 " kg/m .  Physical Exam  Vitals and nursing note reviewed.   Constitutional:       General: He is not in acute distress.     Appearance: Normal appearance. He is not ill-appearing, toxic-appearing or diaphoretic.   Cardiovascular:      Rate and Rhythm: Normal rate and regular rhythm.      Heart sounds: No murmur heard.     No friction rub. No gallop.   Pulmonary:      Effort: No accessory muscle usage, prolonged expiration or respiratory distress.      Breath sounds: Decreased breath sounds and rhonchi present. No wheezing or rales.   Neurological:      General: No focal deficit present.      Mental Status: He is alert and oriented to person, place, and time.   Psychiatric:         Mood and Affect: Mood normal.         Behavior: Behavior normal.         Thought Content: Thought content normal.         Judgment: Judgment normal.                  Signed Electronically by: Howard Luu PA-C

## 2025-07-14 DIAGNOSIS — J43.8 OTHER EMPHYSEMA (H): ICD-10-CM

## 2025-07-14 RX ORDER — IPRATROPIUM BROMIDE AND ALBUTEROL SULFATE 2.5; .5 MG/3ML; MG/3ML
1 SOLUTION RESPIRATORY (INHALATION) 2 TIMES DAILY
Qty: 90 ML | Refills: 1 | Status: SHIPPED | OUTPATIENT
Start: 2025-07-14

## 2025-08-23 DIAGNOSIS — J43.8 OTHER EMPHYSEMA (H): ICD-10-CM

## 2025-08-25 ENCOUNTER — PATIENT OUTREACH (OUTPATIENT)
Dept: CARE COORDINATION | Facility: CLINIC | Age: OVER 89
End: 2025-08-25
Payer: COMMERCIAL

## 2025-08-25 RX ORDER — IPRATROPIUM BROMIDE AND ALBUTEROL SULFATE 2.5; .5 MG/3ML; MG/3ML
1 SOLUTION RESPIRATORY (INHALATION) 2 TIMES DAILY
Qty: 90 ML | Refills: 2 | Status: SHIPPED | OUTPATIENT
Start: 2025-08-25

## (undated) DEVICE — BLADE SAW SAGITTAL STRK DUAL CUT 4118-135-090

## (undated) DEVICE — GLOVE BIOGEL INDICATOR 7.5 LF 41675

## (undated) DEVICE — CAST PADDING 6IN COTTON WEBRIL STERILE 2554

## (undated) DEVICE — CUSTOM PACK TOTAL KNEE ACCESSORY SOP5BTAHEA

## (undated) DEVICE — DRESSING MEPILEX AG SILVER 4X12 395990

## (undated) DEVICE — ELECTRODE PATIENT RETURN ADULT L10 FT 2 PLATE CORD 0855C

## (undated) DEVICE — SUCTION MANIFOLD NEPTUNE 2 SYS 4 PORT 0702-020-000

## (undated) DEVICE — GLOVE SURG PI ULTRA TOUCH M SZ 7-1/2 LF

## (undated) DEVICE — A3 SUPPLIES- SEE NURSING INFO PAGE

## (undated) DEVICE — SUTURE VICRYL+ 2-0 27IN CT-1 UND VCP259H

## (undated) DEVICE — Device

## (undated) DEVICE — SU STRATAFIX PDS PLUS 1 CT-1 18" SXPP1A404

## (undated) DEVICE — SOL WATER IRRIG 1000ML BOTTLE 2F7114

## (undated) DEVICE — SU DERMABOND ADVANCED .7ML DNX12

## (undated) DEVICE — SUTURE VICRYL+ 1 27IN CT-1 UND VCP261H

## (undated) DEVICE — GLOVE UNDER INDICATOR PI SZ 6.5 LF 41665

## (undated) DEVICE — GLOVE BIOGEL PI SZ 6.5 40865

## (undated) DEVICE — DECANTER VIAL 2006S

## (undated) DEVICE — GLOVE BIOGEL PI SZ 7.0 40870

## (undated) DEVICE — GLOVE UNDER INDICATOR PI SZ 7.0 LF 41670

## (undated) DEVICE — SOL NACL 0.9% INJ 1000ML BAG 2B1324X

## (undated) DEVICE — SUTURE MONOCRYL 3-0 18 PS2 UND MCP497G

## (undated) DEVICE — SOL NACL 0.9% IRRIG 1000ML BOTTLE 2F7124

## (undated) DEVICE — GOWN IMPERVIOUS BREATHABLE SMART LG 89015

## (undated) DEVICE — CUSTOM PACK TOTAL KNEE SOP5BTKHEC

## (undated) DEVICE — HOLDER LIMB VELCRO OR 0814-1533

## (undated) RX ORDER — PROPOFOL 10 MG/ML
INJECTION, EMULSION INTRAVENOUS
Status: DISPENSED
Start: 2024-03-20

## (undated) RX ORDER — DEXMEDETOMIDINE HYDROCHLORIDE 4 UG/ML
INJECTION, SOLUTION INTRAVENOUS
Status: DISPENSED
Start: 2024-03-20

## (undated) RX ORDER — DEXAMETHASONE SODIUM PHOSPHATE 10 MG/ML
INJECTION, EMULSION INTRAMUSCULAR; INTRAVENOUS
Status: DISPENSED
Start: 2024-03-20

## (undated) RX ORDER — ONDANSETRON 2 MG/ML
INJECTION INTRAMUSCULAR; INTRAVENOUS
Status: DISPENSED
Start: 2024-03-20

## (undated) RX ORDER — EPHEDRINE SULFATE 50 MG/ML
INJECTION, SOLUTION INTRAMUSCULAR; INTRAVENOUS; SUBCUTANEOUS
Status: DISPENSED
Start: 2024-03-20

## (undated) RX ORDER — BUPIVACAINE HYDROCHLORIDE 5 MG/ML
INJECTION, SOLUTION EPIDURAL; INTRACAUDAL
Status: DISPENSED
Start: 2024-03-20